# Patient Record
Sex: FEMALE | Race: WHITE | Employment: OTHER
[De-identification: names, ages, dates, MRNs, and addresses within clinical notes are randomized per-mention and may not be internally consistent; named-entity substitution may affect disease eponyms.]

---

## 2017-02-01 DIAGNOSIS — Z20.818 PERTUSSIS EXPOSURE: Primary | ICD-10-CM

## 2017-02-01 RX ORDER — AZITHROMYCIN 250 MG/1
TABLET, FILM COATED ORAL
Qty: 6 TABLET | Refills: 0 | Status: SHIPPED | OUTPATIENT
Start: 2017-02-01 | End: 2019-07-11

## 2017-02-01 NOTE — TELEPHONE ENCOUNTER
RN Close Contact Pertussis ExposureTreatment Protocol    Kayy Salazar, a 36 year old female, has been exposed to a known positive pertussis case.    ASSESSMENT/PLAN:   1.  Antibiotic treatment is indicated as per close contact guidelines.  Adults- Primary- Azithromycin 500 mg in a single dose on day 1 then 250 mg per day on days 2-5  2.  Follow-up: Contact clinic if further questions or concerns    SUBJECTIVE:  Symptoms:     Shortness of breath: NO    Cough: NO    Exposure to infectious person was lives with child  Predisposing conditions include: None  In addition notes: None    Complicating Factors or Serious Symptoms:   Patient reports: none  Patient denies: age under 1 year, age over 65, immunocompromised/ cancer/HIV/COPD, pregnant    ALLERGIES:  Allergies   Allergen Reactions     Amoxicillin Rash     Vicodin [Hydrocodone-Acetaminophen] Rash       OBJECTIVE:  Weight: 0 lbs 0 oz    Education provided.  Patient verbalizes understanding of this plan and is agreeable.  Encounter handled by: Nurse Triage with [facility] - provider name: .Dr. Silverio Maria  .     Tiffanie Howard RN

## 2017-12-17 ENCOUNTER — HEALTH MAINTENANCE LETTER (OUTPATIENT)
Age: 37
End: 2017-12-17

## 2019-07-11 ENCOUNTER — OFFICE VISIT (OUTPATIENT)
Dept: FAMILY MEDICINE | Facility: CLINIC | Age: 39
End: 2019-07-11
Payer: COMMERCIAL

## 2019-07-11 VITALS
HEART RATE: 60 BPM | RESPIRATION RATE: 18 BRPM | WEIGHT: 119 LBS | BODY MASS INDEX: 21.9 KG/M2 | OXYGEN SATURATION: 99 % | DIASTOLIC BLOOD PRESSURE: 68 MMHG | TEMPERATURE: 96.8 F | SYSTOLIC BLOOD PRESSURE: 122 MMHG | HEIGHT: 62 IN

## 2019-07-11 DIAGNOSIS — R55 SYNCOPE, UNSPECIFIED SYNCOPE TYPE: Primary | ICD-10-CM

## 2019-07-11 DIAGNOSIS — R79.0 LOW FERRITIN LEVEL: ICD-10-CM

## 2019-07-11 LAB
ALBUMIN SERPL-MCNC: 3.8 G/DL (ref 3.4–5)
ALP SERPL-CCNC: 52 U/L (ref 40–150)
ALT SERPL W P-5'-P-CCNC: 17 U/L (ref 0–50)
ANION GAP SERPL CALCULATED.3IONS-SCNC: 5 MMOL/L (ref 3–14)
AST SERPL W P-5'-P-CCNC: 16 U/L (ref 0–45)
BASOPHILS # BLD AUTO: 0 10E9/L (ref 0–0.2)
BASOPHILS NFR BLD AUTO: 0.5 %
BILIRUB SERPL-MCNC: 1.1 MG/DL (ref 0.2–1.3)
BUN SERPL-MCNC: 16 MG/DL (ref 7–30)
CALCIUM SERPL-MCNC: 8.9 MG/DL (ref 8.5–10.1)
CHLORIDE SERPL-SCNC: 104 MMOL/L (ref 94–109)
CO2 SERPL-SCNC: 27 MMOL/L (ref 20–32)
CREAT SERPL-MCNC: 0.78 MG/DL (ref 0.52–1.04)
DIFFERENTIAL METHOD BLD: NORMAL
EOSINOPHIL # BLD AUTO: 0.1 10E9/L (ref 0–0.7)
EOSINOPHIL NFR BLD AUTO: 1.4 %
ERYTHROCYTE [DISTWIDTH] IN BLOOD BY AUTOMATED COUNT: 14.3 % (ref 10–15)
FERRITIN SERPL-MCNC: 7 NG/ML (ref 12–150)
GFR SERPL CREATININE-BSD FRML MDRD: >90 ML/MIN/{1.73_M2}
GLUCOSE SERPL-MCNC: 98 MG/DL (ref 70–99)
HCT VFR BLD AUTO: 37.5 % (ref 35–47)
HGB BLD-MCNC: 12.3 G/DL (ref 11.7–15.7)
LYMPHOCYTES # BLD AUTO: 1.7 10E9/L (ref 0.8–5.3)
LYMPHOCYTES NFR BLD AUTO: 28.4 %
MCH RBC QN AUTO: 27.8 PG (ref 26.5–33)
MCHC RBC AUTO-ENTMCNC: 32.8 G/DL (ref 31.5–36.5)
MCV RBC AUTO: 85 FL (ref 78–100)
MONOCYTES # BLD AUTO: 0.3 10E9/L (ref 0–1.3)
MONOCYTES NFR BLD AUTO: 5.6 %
NEUTROPHILS # BLD AUTO: 3.8 10E9/L (ref 1.6–8.3)
NEUTROPHILS NFR BLD AUTO: 64.1 %
PLATELET # BLD AUTO: 221 10E9/L (ref 150–450)
POTASSIUM SERPL-SCNC: 3.9 MMOL/L (ref 3.4–5.3)
PROT SERPL-MCNC: 7.2 G/DL (ref 6.8–8.8)
RBC # BLD AUTO: 4.42 10E12/L (ref 3.8–5.2)
SODIUM SERPL-SCNC: 136 MMOL/L (ref 133–144)
TSH SERPL DL<=0.005 MIU/L-ACNC: 1.86 MU/L (ref 0.4–4)
WBC # BLD AUTO: 5.9 10E9/L (ref 4–11)

## 2019-07-11 PROCEDURE — 80053 COMPREHEN METABOLIC PANEL: CPT | Performed by: NURSE PRACTITIONER

## 2019-07-11 PROCEDURE — 82728 ASSAY OF FERRITIN: CPT | Performed by: NURSE PRACTITIONER

## 2019-07-11 PROCEDURE — 36415 COLL VENOUS BLD VENIPUNCTURE: CPT | Performed by: NURSE PRACTITIONER

## 2019-07-11 PROCEDURE — 84443 ASSAY THYROID STIM HORMONE: CPT | Performed by: NURSE PRACTITIONER

## 2019-07-11 PROCEDURE — 99203 OFFICE O/P NEW LOW 30 MIN: CPT | Performed by: NURSE PRACTITIONER

## 2019-07-11 PROCEDURE — 85025 COMPLETE CBC W/AUTO DIFF WBC: CPT | Performed by: NURSE PRACTITIONER

## 2019-07-11 ASSESSMENT — MIFFLIN-ST. JEOR: SCORE: 1163.03

## 2019-08-15 ENCOUNTER — TELEPHONE (OUTPATIENT)
Dept: FAMILY MEDICINE | Facility: CLINIC | Age: 39
End: 2019-08-15

## 2019-08-15 NOTE — TELEPHONE ENCOUNTER
Panel Management Review      Patient has the following on her problem list:   Patient Active Problem List   Diagnosis     Chronic diarrhea     Undiagnosed cardiac murmurs     Multiple joint pain         Composite cancer screening  Chart review shows that this patient is due/due soon for the following   Health Maintenance   Topic Date Due     PREVENTIVE CARE VISIT  1978     HIV SCREENING  10/30/1993     DTAP/TDAP/TD IMMUNIZATION (1 - Tdap) 10/30/2003     PAP  03/01/2016     PHQ-2  01/01/2019     INFLUENZA VACCINE (1) 09/01/2019     IPV IMMUNIZATION  Aged Out     MENINGITIS IMMUNIZATION  Aged Out     Summary:    Patient is due/failing the following:   PAP    Action needed:   Patient needs office visit for PE with pap screening .    Type of outreach:    Sent letter.    Questions for provider review:    None                                                                                                                                    Mary Chavez MA     Chart routed to none .

## 2019-08-15 NOTE — LETTER
[facility]  [address]  [place] [address]  Phone: [phone]      8/15/2019     Kayy Martin  [address] [address]      Dear Kayy:    Here at [facility], we care about your health and have reviewed your chart in order to best serve your health care needs. Based on this review, it is recommended that you complete the following:    You are due for an annual preventative physical with a pap screening. Please contact the clinic scheduling desk at [phone] to schedule this appointment with the provider of your choice . A pap test is used to detect cervical cancer. The test should be taken at least once every three years but women who are at a greater risk for cervical cancer may need to have the test more often. Recommended every three years for women 21 and older.     You are at a greater risk for cervical cancer if:   - You have had a sexually transmitted disease   - You have had more than one sex partner   - You have had an abnormal pap test in the past    If you have any additional questions or concerns, or if you have had testing done elsewhere, please notify your clinic. Thank you for trusting [facility] and we appreciate the opportunity to serve you. We look forward to supporting your healthcare needs soon.    If you have a My-Chart Account, you also can schedule this appointment through there.      Sincerely,      Aleisha BOONE CNP/Mary Chavez MA

## 2021-01-01 NOTE — PROGRESS NOTES
"Subjective     Kayy Salazar is a 38 year old female who presents to clinic today for the following health issues:    HPI   Concern - Syncope   Onset: July 4th     Description:   She was in the bathroom and was feeling light headed, felt like she had pain all over her body and felt nauseous, had tinnitus in both of her ears, was sweating, heart was beating fast, she went to lay down and passed out. Her daughter found her, she was unsure of how long her mother was passed out before.      The patient is feeling well today, denies history of palpitations, chest pain, shortness of breath. States tinnitus resolved. Kayy states that she was getting ready for a run, she did not had anything to eat, she usually eats after exercise. She thinks that she is drinking enough fluids. The patient reports having a lot of stress due to her daughter, who is 9, was recently diagnosed with leukemia.    Reviewed and updated as needed this visit by Provider  Problems         Review of Systems   ROS COMP: Constitutional, HEENT, cardiovascular, pulmonary, gi and gu systems are negative, except as otherwise noted.      Objective    /68 (BP Location: Left arm, Patient Position: Sitting, Cuff Size: Adult Regular)   Pulse 60   Temp 96.8  F (36  C) (Tympanic)   Resp 18   Ht 1.575 m (5' 2\")   Wt 54 kg (119 lb)   SpO2 99%   BMI 21.77 kg/m    Body mass index is 21.77 kg/m .  Physical Exam   GENERAL: healthy, alert and no distress  HENT: ear canals and TM's normal, nose and mouth without ulcers or lesions  NECK: no adenopathy, no asymmetry, masses, or scars and thyroid normal to palpation  RESP: lungs clear to auscultation - no rales, rhonchi or wheezes  CV: regular rate and rhythm, normal S1 S2, no S3 or S4, no murmur, click or rub, no peripheral edema and peripheral pulses strong  MS: no gross musculoskeletal defects noted, no edema  NEURO: Normal strength and tone, mentation intact and speech normal  PSYCH: mentation appears " Dr. Armin Tobar notified of tcb of 6.7 after 24 hours of age,cchd of 97/98 and blood sugar of 66 after 24 hours of age. normal, affect normal/bright    Diagnostic Test Results:  Labs reviewed in Epic  Results for orders placed or performed in visit on 07/11/19 (from the past 24 hour(s))   CBC with platelets differential   Result Value Ref Range    WBC 5.9 4.0 - 11.0 10e9/L    RBC Count 4.42 3.8 - 5.2 10e12/L    Hemoglobin 12.3 11.7 - 15.7 g/dL    Hematocrit 37.5 35.0 - 47.0 %    MCV 85 78 - 100 fl    MCH 27.8 26.5 - 33.0 pg    MCHC 32.8 31.5 - 36.5 g/dL    RDW 14.3 10.0 - 15.0 %    Platelet Count 221 150 - 450 10e9/L    % Neutrophils 64.1 %    % Lymphocytes 28.4 %    % Monocytes 5.6 %    % Eosinophils 1.4 %    % Basophils 0.5 %    Absolute Neutrophil 3.8 1.6 - 8.3 10e9/L    Absolute Lymphocytes 1.7 0.8 - 5.3 10e9/L    Absolute Monocytes 0.3 0.0 - 1.3 10e9/L    Absolute Eosinophils 0.1 0.0 - 0.7 10e9/L    Absolute Basophils 0.0 0.0 - 0.2 10e9/L    Diff Method Automated Method    TSH with free T4 reflex   Result Value Ref Range    TSH 1.86 0.40 - 4.00 mU/L   Comprehensive metabolic panel (BMP + Alb, Alk Phos, ALT, AST, Total. Bili, TP)   Result Value Ref Range    Sodium 136 133 - 144 mmol/L    Potassium 3.9 3.4 - 5.3 mmol/L    Chloride 104 94 - 109 mmol/L    Carbon Dioxide 27 20 - 32 mmol/L    Anion Gap 5 3 - 14 mmol/L    Glucose 98 70 - 99 mg/dL    Urea Nitrogen 16 7 - 30 mg/dL    Creatinine 0.78 0.52 - 1.04 mg/dL    GFR Estimate >90 >60 mL/min/[1.73_m2]    GFR Estimate If Black >90 >60 mL/min/[1.73_m2]    Calcium 8.9 8.5 - 10.1 mg/dL    Bilirubin Total 1.1 0.2 - 1.3 mg/dL    Albumin 3.8 3.4 - 5.0 g/dL    Protein Total 7.2 6.8 - 8.8 g/dL    Alkaline Phosphatase 52 40 - 150 U/L    ALT 17 0 - 50 U/L    AST 16 0 - 45 U/L   Ferritin   Result Value Ref Range    Ferritin 7 (L) 12 - 150 ng/mL           Assessment & Plan     1. Syncope, unspecified syncope type  -labs normal, which is reassuring, exam is normal and the patient is feeling well today  -advised to drink more fluids  - CBC with platelets differential  - TSH with free T4 reflex  -  Comprehensive metabolic panel (BMP + Alb, Alk Phos, ALT, AST, Total. Bili, TP)  - Ferritin    2. Low ferritin level  -she did not tolerate regular iron, recommended slow Fe  - ferrous sulfate (SLO-FE) 142 (45 Fe) MG CR tablet; Take 1 tablet (142 mg) by mouth daily  Dispense: 30 tablet; Refill: 0       See Patient Instructions    Return in about 2 weeks (around 7/25/2019), or if symptoms worsen or fail to improve.    PAOLO Solo [facility] [place]

## 2021-03-10 ENCOUNTER — OFFICE VISIT (OUTPATIENT)
Dept: FAMILY MEDICINE | Facility: CLINIC | Age: 41
End: 2021-03-10
Payer: COMMERCIAL

## 2021-03-10 VITALS
OXYGEN SATURATION: 99 % | DIASTOLIC BLOOD PRESSURE: 72 MMHG | BODY MASS INDEX: 21.16 KG/M2 | TEMPERATURE: 98 F | SYSTOLIC BLOOD PRESSURE: 126 MMHG | HEIGHT: 62 IN | RESPIRATION RATE: 12 BRPM | WEIGHT: 115 LBS | HEART RATE: 88 BPM

## 2021-03-10 DIAGNOSIS — D50.0 IRON DEFICIENCY ANEMIA DUE TO CHRONIC BLOOD LOSS: ICD-10-CM

## 2021-03-10 DIAGNOSIS — K52.9 CHRONIC DIARRHEA: Primary | ICD-10-CM

## 2021-03-10 LAB
BASOPHILS # BLD AUTO: 0 10E9/L (ref 0–0.2)
BASOPHILS NFR BLD AUTO: 0.4 %
DIFFERENTIAL METHOD BLD: ABNORMAL
EOSINOPHIL # BLD AUTO: 0 10E9/L (ref 0–0.7)
EOSINOPHIL NFR BLD AUTO: 0.2 %
ERYTHROCYTE [DISTWIDTH] IN BLOOD BY AUTOMATED COUNT: 16.1 % (ref 10–15)
HCT VFR BLD AUTO: 36 % (ref 35–47)
HGB BLD-MCNC: 11.1 G/DL (ref 11.7–15.7)
LYMPHOCYTES # BLD AUTO: 1.3 10E9/L (ref 0.8–5.3)
LYMPHOCYTES NFR BLD AUTO: 15.8 %
MCH RBC QN AUTO: 23.4 PG (ref 26.5–33)
MCHC RBC AUTO-ENTMCNC: 30.8 G/DL (ref 31.5–36.5)
MCV RBC AUTO: 76 FL (ref 78–100)
MONOCYTES # BLD AUTO: 0.4 10E9/L (ref 0–1.3)
MONOCYTES NFR BLD AUTO: 4.5 %
NEUTROPHILS # BLD AUTO: 6.6 10E9/L (ref 1.6–8.3)
NEUTROPHILS NFR BLD AUTO: 79.1 %
PLATELET # BLD AUTO: 307 10E9/L (ref 150–450)
RBC # BLD AUTO: 4.75 10E12/L (ref 3.8–5.2)
TSH SERPL DL<=0.005 MIU/L-ACNC: 1.47 MU/L (ref 0.4–4)
WBC # BLD AUTO: 8.3 10E9/L (ref 4–11)

## 2021-03-10 PROCEDURE — 85025 COMPLETE CBC W/AUTO DIFF WBC: CPT | Performed by: FAMILY MEDICINE

## 2021-03-10 PROCEDURE — 84443 ASSAY THYROID STIM HORMONE: CPT | Performed by: FAMILY MEDICINE

## 2021-03-10 PROCEDURE — 36415 COLL VENOUS BLD VENIPUNCTURE: CPT | Performed by: FAMILY MEDICINE

## 2021-03-10 PROCEDURE — 99203 OFFICE O/P NEW LOW 30 MIN: CPT | Performed by: FAMILY MEDICINE

## 2021-03-10 RX ORDER — ONDANSETRON 4 MG/1
4 TABLET, FILM COATED ORAL EVERY 8 HOURS PRN
Qty: 20 TABLET | Refills: 0 | Status: SHIPPED | OUTPATIENT
Start: 2021-03-10 | End: 2021-03-30

## 2021-03-10 ASSESSMENT — ENCOUNTER SYMPTOMS
FREQUENCY: 0
NAUSEA: 1
BELCHING: 0
DYSURIA: 0
FEVER: 0
HEADACHES: 0
ARTHRALGIAS: 0
CONSTIPATION: 0
HEMATURIA: 0
FLATUS: 1
DIARRHEA: 1
ANOREXIA: 1
HEMATOCHEZIA: 0
WEIGHT LOSS: 1
VOMITING: 1
ABDOMINAL PAIN: 1
MYALGIAS: 0

## 2021-03-10 ASSESSMENT — PATIENT HEALTH QUESTIONNAIRE - PHQ9
SUM OF ALL RESPONSES TO PHQ QUESTIONS 1-9: 9
10. IF YOU CHECKED OFF ANY PROBLEMS, HOW DIFFICULT HAVE THESE PROBLEMS MADE IT FOR YOU TO DO YOUR WORK, TAKE CARE OF THINGS AT HOME, OR GET ALONG WITH OTHER PEOPLE: SOMEWHAT DIFFICULT
SUM OF ALL RESPONSES TO PHQ QUESTIONS 1-9: 9

## 2021-03-10 ASSESSMENT — PAIN SCALES - GENERAL: PAINLEVEL: EXTREME PAIN (8)

## 2021-03-10 ASSESSMENT — MIFFLIN-ST. JEOR: SCORE: 1131.27

## 2021-03-10 NOTE — PROGRESS NOTES
Assessment & Plan     Chronic diarrhea  Acute on chronic diarrhea, differentials for infectious vs IBS vs IBS vs food allergy. Diagnostic orders as below, further plans will depend on clinical status.  - CBC with platelets and differential  - Comprehensive metabolic panel; Future  - TSH with free T4 reflex  - Enteric Bacteria and Virus Panel by MANDO Stool; Future  - Helicobacter pylori Antigen Stool; Future  - Ova and Parasite Exam Routine; Future  - Clostridium difficile Toxin B PCR; Future  - ondansetron (ZOFRAN) 4 MG tablet; Take 1 tablet (4 mg) by mouth every 8 hours as needed for nausea      20  minutes spent on the date of the encounter doing chart review, history and exam, documentation and further activities as noted above           Return in about 2 weeks (around 3/24/2021) for Routine preventive/recheck.    Karol Hairston MD  [facility] VASQUEZ Sultana is a 40 year old who presents for the following health issues  accompanied by her Self:    Abdominal Pain   This is a new problem. The current episode started more than 1 month ago. The problem occurs constantly. The problem has been gradually worsening. The pain is located in the epigastric region. The quality of the pain is dull and sharp. The pain is at a severity of 8/10. Associated symptoms include anorexia, diarrhea, flatus, nausea and vomiting. Pertinent negatives include fever, belching, hematochezia, melena, constipation, dysuria, frequency, hematuria, headaches, arthralgias and myalgias. The symptoms are aggravated by eating. Nothing relieves the symptoms.    unintentional 8 pound weight loss    Review of Systems   Constitutional: Negative for fever.   Gastrointestinal: Positive for abdominal pain, anorexia, diarrhea, flatus, nausea and vomiting. Negative for constipation, hematochezia and melena.   Genitourinary: Negative for dysuria, frequency and hematuria.   Musculoskeletal: Negative for arthralgias and  "myalgias.   Neurological: Negative for headaches.            Objective    /72   Pulse 88   Temp 98  F (36.7  C) (Temporal)   Resp 12   Ht 1.569 m (5' 1.77\")   Wt 52.2 kg (115 lb)   LMP 02/14/2021 (Exact Date)   SpO2 99%   BMI 21.19 kg/m    Body mass index is 21.19 kg/m .  Physical Exam   GENERAL: healthy, alert and no distress  EYES: Eyes grossly normal to inspection, PERRL and conjunctivae and sclerae normal  HENT: ear canals and TM's normal, nose and mouth without ulcers or lesions but dry mucous memebranes  NECK: no adenopathy, no asymmetry, masses, or scars and thyroid normal to palpation  RESP: lungs clear to auscultation - no rales, rhonchi or wheezes  CV: regular rate and rhythm, normal S1 S2, no S3 or S4, no murmur, click or rub, no peripheral edema and peripheral pulses strong  ABDOMEN: soft, Tender low quadrant, no hepatosplenomegaly, no masses and bowel sounds normal  MS: no gross musculoskeletal defects noted, no edema            Answers for HPI/ROS submitted by the patient on 3/10/2021   If you checked off any problems, how difficult have these problems made it for you to do your work, take care of things at home, or get along with other people?: Somewhat difficult  PHQ9 TOTAL SCORE: 9    "

## 2021-03-10 NOTE — PATIENT INSTRUCTIONS
I have recommended small amounts clear fluids frequently, Pedialyte, soups, water, BRAT diet, advance diet as tolerated and Immodium OTC prn diarrhea. Return office visit if symptoms persist or worsen; I have alerted the patient to call if high fever, dehydration, marked weakness, fainting, increased abdominal pain, blood in stool or vomit.    Imodium ( loperamide)

## 2021-03-11 DIAGNOSIS — K52.9 CHRONIC DIARRHEA: ICD-10-CM

## 2021-03-11 LAB
C COLI+JEJUNI+LARI FUSA STL QL NAA+PROBE: NOT DETECTED
C DIFF TOX B STL QL: NEGATIVE
EC STX1 GENE STL QL NAA+PROBE: NOT DETECTED
EC STX2 GENE STL QL NAA+PROBE: NOT DETECTED
ENTERIC PATHOGEN COMMENT: NORMAL
NOROV GI+II ORF1-ORF2 JNC STL QL NAA+PR: NOT DETECTED
RVA NSP5 STL QL NAA+PROBE: NOT DETECTED
SALMONELLA SP RPOD STL QL NAA+PROBE: NOT DETECTED
SHIGELLA SP+EIEC IPAH STL QL NAA+PROBE: NOT DETECTED
SPECIMEN SOURCE: NORMAL
V CHOL+PARA RFBL+TRKH+TNAA STL QL NAA+PR: NOT DETECTED
Y ENTERO RECN STL QL NAA+PROBE: NOT DETECTED

## 2021-03-11 PROCEDURE — 87506 IADNA-DNA/RNA PROBE TQ 6-11: CPT | Performed by: FAMILY MEDICINE

## 2021-03-11 PROCEDURE — 87493 C DIFF AMPLIFIED PROBE: CPT | Mod: 59 | Performed by: FAMILY MEDICINE

## 2021-03-11 PROCEDURE — 83993 ASSAY FOR CALPROTECTIN FECAL: CPT | Performed by: FAMILY MEDICINE

## 2021-03-11 PROCEDURE — 87209 SMEAR COMPLEX STAIN: CPT | Performed by: FAMILY MEDICINE

## 2021-03-11 PROCEDURE — 87177 OVA AND PARASITES SMEARS: CPT | Performed by: FAMILY MEDICINE

## 2021-03-12 DIAGNOSIS — K52.9 CHRONIC DIARRHEA: Primary | ICD-10-CM

## 2021-03-12 LAB
CALPROTECTIN STL-MCNT: 58.9 MG/KG (ref 0–49.9)
O+P STL MICRO: NORMAL
O+P STL MICRO: NORMAL
SPECIMEN SOURCE: NORMAL

## 2021-03-15 ENCOUNTER — TELEPHONE (OUTPATIENT)
Dept: FAMILY MEDICINE | Facility: CLINIC | Age: 41
End: 2021-03-15

## 2021-03-15 DIAGNOSIS — K52.9 CHRONIC DIARRHEA: Primary | ICD-10-CM

## 2021-03-15 NOTE — TELEPHONE ENCOUNTER
Provider ordered labs that we are unable to add on.  Orders credited and placed as future please reach out to patient as needed.

## 2021-03-17 ENCOUNTER — OFFICE VISIT (OUTPATIENT)
Dept: FAMILY MEDICINE | Facility: CLINIC | Age: 41
End: 2021-03-17
Payer: COMMERCIAL

## 2021-03-17 ENCOUNTER — ANCILLARY PROCEDURE (OUTPATIENT)
Dept: CT IMAGING | Facility: CLINIC | Age: 41
End: 2021-03-17
Attending: FAMILY MEDICINE
Payer: COMMERCIAL

## 2021-03-17 ENCOUNTER — INFUSION THERAPY VISIT (OUTPATIENT)
Dept: INFUSION THERAPY | Facility: CLINIC | Age: 41
End: 2021-03-17
Payer: COMMERCIAL

## 2021-03-17 VITALS
DIASTOLIC BLOOD PRESSURE: 74 MMHG | WEIGHT: 114.5 LBS | TEMPERATURE: 98.1 F | HEIGHT: 62 IN | RESPIRATION RATE: 18 BRPM | BODY MASS INDEX: 21.07 KG/M2 | HEART RATE: 81 BPM | OXYGEN SATURATION: 100 % | SYSTOLIC BLOOD PRESSURE: 134 MMHG

## 2021-03-17 DIAGNOSIS — R10.84 ABDOMINAL PAIN, GENERALIZED: Primary | ICD-10-CM

## 2021-03-17 DIAGNOSIS — N83.8 MASS OF LEFT OVARY: ICD-10-CM

## 2021-03-17 DIAGNOSIS — R11.0 NAUSEA: ICD-10-CM

## 2021-03-17 DIAGNOSIS — N91.2 ABSENCE OF MENSTRUATION: ICD-10-CM

## 2021-03-17 DIAGNOSIS — D50.0 IRON DEFICIENCY ANEMIA DUE TO CHRONIC BLOOD LOSS: ICD-10-CM

## 2021-03-17 LAB
ALBUMIN SERPL-MCNC: 3.7 G/DL (ref 3.4–5)
ALP SERPL-CCNC: 50 U/L (ref 40–150)
ALT SERPL W P-5'-P-CCNC: 15 U/L (ref 0–50)
ANION GAP SERPL CALCULATED.3IONS-SCNC: 6 MMOL/L (ref 3–14)
AST SERPL W P-5'-P-CCNC: 10 U/L (ref 0–45)
BASOPHILS # BLD AUTO: 0 10E9/L (ref 0–0.2)
BASOPHILS NFR BLD AUTO: 0.5 %
BILIRUB SERPL-MCNC: 0.6 MG/DL (ref 0.2–1.3)
BUN SERPL-MCNC: 10 MG/DL (ref 7–30)
CALCIUM SERPL-MCNC: 9.1 MG/DL (ref 8.5–10.1)
CANCER AG125 SERPL-ACNC: 191 U/ML (ref 0–30)
CHLORIDE SERPL-SCNC: 101 MMOL/L (ref 94–109)
CO2 SERPL-SCNC: 30 MMOL/L (ref 20–32)
CREAT SERPL-MCNC: 0.71 MG/DL (ref 0.52–1.04)
DIFFERENTIAL METHOD BLD: ABNORMAL
EOSINOPHIL # BLD AUTO: 0.1 10E9/L (ref 0–0.7)
EOSINOPHIL NFR BLD AUTO: 0.9 %
ERYTHROCYTE [DISTWIDTH] IN BLOOD BY AUTOMATED COUNT: 16.9 % (ref 10–15)
GFR SERPL CREATININE-BSD FRML MDRD: >90 ML/MIN/{1.73_M2}
GLUCOSE SERPL-MCNC: 78 MG/DL (ref 70–99)
HCG UR QL: NEGATIVE
HCT VFR BLD AUTO: 37.1 % (ref 35–47)
HGB BLD-MCNC: 11.3 G/DL (ref 11.7–15.7)
IRON SATN MFR SERPL: 6 % (ref 15–46)
IRON SERPL-MCNC: 22 UG/DL (ref 35–180)
LYMPHOCYTES # BLD AUTO: 1.5 10E9/L (ref 0.8–5.3)
LYMPHOCYTES NFR BLD AUTO: 27.1 %
MCH RBC QN AUTO: 23.3 PG (ref 26.5–33)
MCHC RBC AUTO-ENTMCNC: 30.5 G/DL (ref 31.5–36.5)
MCV RBC AUTO: 77 FL (ref 78–100)
MONOCYTES # BLD AUTO: 0.3 10E9/L (ref 0–1.3)
MONOCYTES NFR BLD AUTO: 6 %
NEUTROPHILS # BLD AUTO: 3.6 10E9/L (ref 1.6–8.3)
NEUTROPHILS NFR BLD AUTO: 65.5 %
PLATELET # BLD AUTO: 370 10E9/L (ref 150–450)
POTASSIUM SERPL-SCNC: 3.4 MMOL/L (ref 3.4–5.3)
PROT SERPL-MCNC: 7.3 G/DL (ref 6.8–8.8)
RADIOLOGIST FLAGS: ABNORMAL
RBC # BLD AUTO: 4.84 10E12/L (ref 3.8–5.2)
SODIUM SERPL-SCNC: 137 MMOL/L (ref 133–144)
TIBC SERPL-MCNC: 349 UG/DL (ref 240–430)
WBC # BLD AUTO: 5.5 10E9/L (ref 4–11)

## 2021-03-17 PROCEDURE — 96361 HYDRATE IV INFUSION ADD-ON: CPT | Performed by: NURSE PRACTITIONER

## 2021-03-17 PROCEDURE — 86304 IMMUNOASSAY TUMOR CA 125: CPT | Performed by: FAMILY MEDICINE

## 2021-03-17 PROCEDURE — 99207 PR NO CHARGE LOS: CPT

## 2021-03-17 PROCEDURE — 80053 COMPREHEN METABOLIC PANEL: CPT | Performed by: FAMILY MEDICINE

## 2021-03-17 PROCEDURE — 96374 THER/PROPH/DIAG INJ IV PUSH: CPT | Performed by: NURSE PRACTITIONER

## 2021-03-17 PROCEDURE — 99214 OFFICE O/P EST MOD 30 MIN: CPT | Performed by: FAMILY MEDICINE

## 2021-03-17 PROCEDURE — 83550 IRON BINDING TEST: CPT | Performed by: FAMILY MEDICINE

## 2021-03-17 PROCEDURE — 83540 ASSAY OF IRON: CPT | Performed by: FAMILY MEDICINE

## 2021-03-17 PROCEDURE — 36415 COLL VENOUS BLD VENIPUNCTURE: CPT | Performed by: FAMILY MEDICINE

## 2021-03-17 PROCEDURE — 85025 COMPLETE CBC W/AUTO DIFF WBC: CPT | Performed by: FAMILY MEDICINE

## 2021-03-17 PROCEDURE — 81025 URINE PREGNANCY TEST: CPT | Performed by: FAMILY MEDICINE

## 2021-03-17 PROCEDURE — 74177 CT ABD & PELVIS W/CONTRAST: CPT | Performed by: RADIOLOGY

## 2021-03-17 RX ORDER — ONDANSETRON 2 MG/ML
4 INJECTION INTRAMUSCULAR; INTRAVENOUS ONCE
Status: CANCELLED
Start: 2021-03-17 | End: 2021-03-17

## 2021-03-17 RX ORDER — ONDANSETRON 2 MG/ML
4 INJECTION INTRAMUSCULAR; INTRAVENOUS ONCE
Status: COMPLETED | OUTPATIENT
Start: 2021-03-17 | End: 2021-03-17

## 2021-03-17 RX ORDER — HEPARIN SODIUM,PORCINE 10 UNIT/ML
5 VIAL (ML) INTRAVENOUS
Status: CANCELLED | OUTPATIENT
Start: 2021-03-17

## 2021-03-17 RX ORDER — HEPARIN SODIUM (PORCINE) LOCK FLUSH IV SOLN 100 UNIT/ML 100 UNIT/ML
5 SOLUTION INTRAVENOUS
Status: CANCELLED | OUTPATIENT
Start: 2021-03-17

## 2021-03-17 RX ORDER — IOPAMIDOL 755 MG/ML
70 INJECTION, SOLUTION INTRAVASCULAR ONCE
Status: COMPLETED | OUTPATIENT
Start: 2021-03-17 | End: 2021-03-17

## 2021-03-17 RX ADMIN — IOPAMIDOL 70 ML: 755 INJECTION, SOLUTION INTRAVASCULAR at 12:19

## 2021-03-17 RX ADMIN — ONDANSETRON 4 MG: 2 INJECTION INTRAMUSCULAR; INTRAVENOUS at 13:08

## 2021-03-17 RX ADMIN — Medication 1000 ML: at 13:05

## 2021-03-17 ASSESSMENT — ENCOUNTER SYMPTOMS
HEADACHES: 0
HEMATURIA: 0
NAUSEA: 1
FLATUS: 0
FREQUENCY: 0
DIARRHEA: 1
ARTHRALGIAS: 0
BELCHING: 1
MYALGIAS: 0
ANOREXIA: 1
CONSTIPATION: 0
VOMITING: 0
WEIGHT LOSS: 0
HEMATOCHEZIA: 0
DYSURIA: 0
ABDOMINAL PAIN: 1
FEVER: 0

## 2021-03-17 ASSESSMENT — MIFFLIN-ST. JEOR: SCORE: 1129

## 2021-03-17 ASSESSMENT — PAIN SCALES - GENERAL: PAINLEVEL: EXTREME PAIN (8)

## 2021-03-17 NOTE — PROGRESS NOTES
Infusion Nursing Note:  Kayy Salazar presents today for IVF/Zofran.    Patient seen by provider today: Yes: previously in a different clinic, Dr Hairston    present during visit today: Not Applicable.    Note: Pt states she has had abdominal pain, nausea and diarrhea for about 4-6 weeks.  See flow sheet for asessment      Intravenous Access:  Peripheral IV placed in CT    Treatment Conditions:  Not Applicable.      Post Infusion Assessment:  Patient tolerated infusion without incident.  Blood return noted pre and post infusion.  Site patent and intact, free from redness, edema or discomfort.  No evidence of extravasations.  Access discontinued per protocol.       Discharge Plan:   Patient discharged in stable condition accompanied by: self.  Departure Mode: Ambulatory.  Pt will RTC if needed.    Juvencio Mcdonnell RN

## 2021-03-17 NOTE — PROGRESS NOTES
Assessment & Plan     Abdominal pain, generalized  Stat ct abdomen/pelvis ordered at the [facility]  Discussed with the infusion center, IVF and IV zofran 4 mg ordered, patient declined morphine IV or any other patient medication  Out patient infusion orders in.  - CT Abdomen Pelvis w Contrast  - Comprehensive metabolic panel  - JUST IN CASE  -   - Oncology/Hematology Adult Referral; Future    Mass of left ovary  I reviewed case with on call Dr Johnson who recommended I discuss further with GYN-oncology. Dr Villalobos the on call fellow was paged, reviewed case and ct abdomen/pelvs with her. Recommended in-clinic new patient consultation.   Patient has a visit 3/18/2021 with Dr Ch at 1:20PM  Patient and spouse Eic with notified of results and plan of care. Declined tramadol for pain management but will take over the counter tylenol at appropriate doses.  -   - Oncology/Hematology Adult Referral; Future    Iron deficiency anemia due to chronic blood loss  - Iron and iron binding capacity  - CBC with platelets and differential  -     Nausea    Absence of menstruation  - HCG Qual, Urine ([id number])    60 minutes spent on the date of the encounter doing chart review, history and exam, documentation and further activities as noted above      No follow-ups on file.    Karol Hairston MD  [facility] VASQUEZ Salazar is a 40 year old female who presents to clinic today for the following health issues accompanied by her Self:    Abdominal Pain   This is a recurrent problem. The current episode started more than 1 month ago. The problem occurs constantly. The problem has been gradually worsening. The pain is located in the generalized abdominal region. The quality of the pain is dull and sharp. The pain is at a severity of 8/10. Associated symptoms include anorexia, belching, diarrhea and nausea. Pertinent negatives include fever, flatus, hematochezia,  melena, vomiting, constipation, dysuria, frequency, hematuria, headaches, arthralgias and myalgias. The symptoms are aggravated by eating and movement. The symptoms are relieved by being still, certain positions and recumbency.      Answers for HPI/ROS submitted by the patient on 3/17/2021   Abdominal pain  Onset quality: gradual  Episode duration: 24 hours  Radiates to: LLQ, epigastric region  weight loss: No  Reproductive organs: 4.1 cm hypoenhancing lesion in the posterior  myometrium is likely a fibroid. 2 enhancing nodules measuring up to 9  mm noted on the surface of the uterine fundus (series 3, image 307)  There is an ill-defined soft tissue density mass measuring 4.8 x 6.2  cm in the left adnexa which is contiguous with the left ovary and  peritoneal lining. Ill-defined peritoneal thickening noted along the  right uterosacral ligament and in the right paracolic region (series  3, image 358 and 244). Nodular omental thickening near the hepatic  flexure (series 3, image 205). There is large volume ascites.     Gastrointestinal: The stomach and duodenum are unremarkable. Small and  large bowel are normal in caliber and without abnormal wall  thickening.     Lymph nodes: Bilateral internal and external iliac lymph nodes  measuring up to 7 mm are nonspecific (series 3, image 325).     Vasculature: Major abdominal arteries are patent.     Bones and soft tissues: No aggressive lytic or sclerotic lesions.                                                                      IMPRESSION:   1.  Left adnexal soft tissue mass associated with large volume ascites  and foci of peritoneal thickening is suspicious for ovarian/peritoneal  malignancy. Recommend gyne-oncology consultation.  2.  4.1 cm posterior uterine mass is likely a fibroid. Enhancing  nodule on the surface of the uterine fundus could be a subserosal  fibroid or peritoneal deposit.  3.  Focal ill-defined hypoenhancement in the liver, near the  "falciform  ligament, could be due to focal fatty infiltration versus peritoneal  deposit. Enhancing lesion in segment 8 is favored to be a hemangioma  but needs confirmation with MRI.  4.  Subcentimeter pelvic lymph nodes are indeterminate.     [Access Center: Left adnexal soft tissue mass associated with large  volume ascites and foci of peritoneal thickening is suspicious for  ovarian/peritoneal malignancy. Recommend gyne-oncology consultation.        Review of Systems   Constitutional: Negative for fever.   Gastrointestinal: Positive for abdominal pain, anorexia, diarrhea and nausea. Negative for constipation, flatus, hematochezia, melena and vomiting.   Genitourinary: Negative for dysuria, frequency and hematuria.   Musculoskeletal: Negative for arthralgias and myalgias.   Neurological: Negative for headaches.          Objective    /74   Pulse 81   Temp 98.1  F (36.7  C) (Temporal)   Resp 18   Ht 1.569 m (5' 1.77\")   Wt 51.9 kg (114 lb 8 oz)   SpO2 100%   BMI 21.10 kg/m    Body mass index is 21.1 kg/m .  Physical Exam   GENERAL: healthy, alert and no distress  EYES: Eyes grossly normal to inspection, PERRL and conjunctivae and sclerae normal  HENT: ear canals and TM's normal, nose and mouth without ulcers or lesions  NECK: no adenopathy, no asymmetry, masses, or scars and thyroid normal to palpation  RESP: lungs clear to auscultation - no rales, rhonchi or wheezes  CV: regular rate and rhythm, normal S1 S2, no S3 or S4, no murmur, click or rub, no peripheral edema and peripheral pulses strong  ABDOMEN: Distendened, generalized tenderness, hypoactive bowel sounds  MS: no gross musculoskeletal defects noted, no edema    Results for orders placed or performed in visit on 03/17/21   CT Abdomen Pelvis w Contrast     Status: Abnormal   Result Value Ref Range    Radiologist flags (Urgent)      Left adnexal soft tissue mass associated with large    Narrative    EXAMINATION: CT ABDOMEN PELVIS W CONTRAST, " 3/17/2021 12:29 PM    TECHNIQUE: Axial CT images from the lung bases through the symphysis  pubis were obtained  with IV contrast. Coronal and sagittal reformats  also provided. Contrast dose: isovue 370 70mL    COMPARISON: None    HISTORY: Abdominal pain, acute (Ped 0-18y); Abdominal pain,  generalized    FINDINGS:    Lung Bases:   The visualized lung bases and lower mediastinal structures are  unremarkable.    ABDOMEN:  Liver: Focal ill-defined hypodensity near the falciform ligament. A  rounded enhancing lesion measuring 1.2 cm in segment 8 (series 3,  image 46). Portal and hepatic veins are patent.    Biliary/Gallbladder: No CT evidence of calculi, wall thickening or  pericholecystic fluid. No intra or extrahepatic biliary dilatation.    Spleen: Normal size. No focal lesions.    Pancreas: No evidence of pancreatic mass or peripancreatic fluid.    Adrenals: Normal.    Kidneys: No hydronephrosis, calculi or mass.    Urinary bladder: Unremarkable.    Reproductive organs: 4.1 cm hypoenhancing lesion in the posterior  myometrium is likely a fibroid. 2 enhancing nodules measuring up to 9  mm noted on the surface of the uterine fundus (series 3, image 307)  There is an ill-defined soft tissue density mass measuring 4.8 x 6.2  cm in the left adnexa which is contiguous with the left ovary and  peritoneal lining. Ill-defined peritoneal thickening noted along the  right uterosacral ligament and in the right paracolic region (series  3, image 358 and 244). Nodular omental thickening near the hepatic  flexure (series 3, image 205). There is large volume ascites.    Gastrointestinal: The stomach and duodenum are unremarkable. Small and  large bowel are normal in caliber and without abnormal wall  thickening.    Lymph nodes: Bilateral internal and external iliac lymph nodes  measuring up to 7 mm are nonspecific (series 3, image 325).    Vasculature: Major abdominal arteries are patent.    Bones and soft tissues: No aggressive  lytic or sclerotic lesions.      Impression    IMPRESSION:   1.  Left adnexal soft tissue mass associated with large volume ascites  and foci of peritoneal thickening is suspicious for ovarian/peritoneal  malignancy. Recommend gyne-oncology consultation.  2.  4.1 cm posterior uterine mass is likely a fibroid. Enhancing  nodule on the surface of the uterine fundus could be a subserosal  fibroid or peritoneal deposit.  3.  Focal ill-defined hypoenhancement in the liver, near the falciform  ligament, could be due to focal fatty infiltration versus peritoneal  deposit. Enhancing lesion in segment 8 is favored to be a hemangioma  but needs confirmation with MRI.  4.  Subcentimeter pelvic lymph nodes are indeterminate.    [Access Center: Left adnexal soft tissue mass associated with large  volume ascites and foci of peritoneal thickening is suspicious for  ovarian/peritoneal malignancy. Recommend gyne-oncology consultation.      This report will be copied to the [facility] to ensure a  provider acknowledges the finding. Access Center is available Monday  through Friday 8am-3:30 pm.     DARWIN RUBIN MD   CBC with platelets and differential     Status: Abnormal   Result Value Ref Range    WBC 5.5 4.0 - 11.0 10e9/L    RBC Count 4.84 3.8 - 5.2 10e12/L    Hemoglobin 11.3 (L) 11.7 - 15.7 g/dL    Hematocrit 37.1 35.0 - 47.0 %    MCV 77 (L) 78 - 100 fl    MCH 23.3 (L) 26.5 - 33.0 pg    MCHC 30.5 (L) 31.5 - 36.5 g/dL    RDW 16.9 (H) 10.0 - 15.0 %    Platelet Count 370 150 - 450 10e9/L    % Neutrophils 65.5 %    % Lymphocytes 27.1 %    % Monocytes 6.0 %    % Eosinophils 0.9 %    % Basophils 0.5 %    Absolute Neutrophil 3.6 1.6 - 8.3 10e9/L    Absolute Lymphocytes 1.5 0.8 - 5.3 10e9/L    Absolute Monocytes 0.3 0.0 - 1.3 10e9/L    Absolute Eosinophils 0.1 0.0 - 0.7 10e9/L    Absolute Basophils 0.0 0.0 - 0.2 10e9/L    Diff Method Automated Method    HCG Qual, Urine ([id number])     Status: None   Result Value Ref Range     HCG Qual Urine Negative NEG^Negative

## 2021-03-17 NOTE — TELEPHONE ENCOUNTER
ONCOLOGY INTAKE: Records Information      APPT INFORMATION:  Referring provider:  Karol Hairston MD  Referring provider s clinic:  [facility]  Reason for visit/diagnosis: Abdominal pain, generalized;Mass of left ovary  Has patient been notified of appointment date and time?: Yes    RECORDS INFORMATION:  Were the records received with the referral (via [employer])? No    Has patient been seen for any external appt for this diagnosis? No    If yes, where? N/a    Has patient had any imaging or procedures outside of [facility] for this condition? No      If Yes, where? N/a    ADDITIONAL INFORMATION:  None

## 2021-03-18 ENCOUNTER — ONCOLOGY VISIT (OUTPATIENT)
Dept: ONCOLOGY | Facility: CLINIC | Age: 41
End: 2021-03-18
Attending: FAMILY MEDICINE
Payer: COMMERCIAL

## 2021-03-18 ENCOUNTER — PRE VISIT (OUTPATIENT)
Dept: ONCOLOGY | Facility: CLINIC | Age: 41
End: 2021-03-18

## 2021-03-18 ENCOUNTER — TELEPHONE (OUTPATIENT)
Dept: ONCOLOGY | Facility: CLINIC | Age: 41
End: 2021-03-18

## 2021-03-18 VITALS
HEART RATE: 76 BPM | TEMPERATURE: 98.7 F | WEIGHT: 115.5 LBS | RESPIRATION RATE: 18 BRPM | SYSTOLIC BLOOD PRESSURE: 119 MMHG | DIASTOLIC BLOOD PRESSURE: 78 MMHG | OXYGEN SATURATION: 99 % | BODY MASS INDEX: 21.28 KG/M2

## 2021-03-18 DIAGNOSIS — R18.8 OTHER ASCITES: ICD-10-CM

## 2021-03-18 DIAGNOSIS — N83.8 MASS OF LEFT OVARY: ICD-10-CM

## 2021-03-18 DIAGNOSIS — R10.84 ABDOMINAL PAIN, GENERALIZED: ICD-10-CM

## 2021-03-18 PROCEDURE — 99205 OFFICE O/P NEW HI 60 MIN: CPT | Performed by: OBSTETRICS & GYNECOLOGY

## 2021-03-18 RX ORDER — CLINDAMYCIN PHOSPHATE 900 MG/50ML
900 INJECTION, SOLUTION INTRAVENOUS SEE ADMIN INSTRUCTIONS
Status: CANCELLED | OUTPATIENT
Start: 2021-03-18

## 2021-03-18 RX ORDER — HEPARIN SODIUM 5000 [USP'U]/.5ML
5000 INJECTION, SOLUTION INTRAVENOUS; SUBCUTANEOUS
Status: CANCELLED | OUTPATIENT
Start: 2021-03-18

## 2021-03-18 RX ORDER — HEPARIN SODIUM,PORCINE 10 UNIT/ML
5 VIAL (ML) INTRAVENOUS
Status: CANCELLED | OUTPATIENT
Start: 2021-03-18

## 2021-03-18 RX ORDER — HEPARIN SODIUM (PORCINE) LOCK FLUSH IV SOLN 100 UNIT/ML 100 UNIT/ML
5 SOLUTION INTRAVENOUS
Status: CANCELLED | OUTPATIENT
Start: 2021-03-18

## 2021-03-18 RX ORDER — CLINDAMYCIN PHOSPHATE 900 MG/50ML
900 INJECTION, SOLUTION INTRAVENOUS
Status: CANCELLED | OUTPATIENT
Start: 2021-03-18

## 2021-03-18 RX ORDER — LIDOCAINE HYDROCHLORIDE 10 MG/ML
20 INJECTION, SOLUTION EPIDURAL; INFILTRATION; INTRACAUDAL; PERINEURAL ONCE
Status: CANCELLED | OUTPATIENT
Start: 2021-03-18 | End: 2021-03-18

## 2021-03-18 RX ORDER — OXYCODONE HYDROCHLORIDE 5 MG/1
5 TABLET ORAL EVERY 6 HOURS PRN
Qty: 30 TABLET | Refills: 0 | Status: SHIPPED | OUTPATIENT
Start: 2021-03-18 | End: 2021-03-21

## 2021-03-18 RX ORDER — PHENAZOPYRIDINE HYDROCHLORIDE 200 MG/1
200 TABLET, FILM COATED ORAL ONCE
Status: CANCELLED | OUTPATIENT
Start: 2021-03-18 | End: 2021-03-18

## 2021-03-18 RX ORDER — GENTAMICIN SULFATE 100 MG/100ML
2 INJECTION, SOLUTION INTRAVENOUS
Status: CANCELLED | OUTPATIENT
Start: 2021-03-18

## 2021-03-18 ASSESSMENT — PAIN SCALES - GENERAL: PAINLEVEL: EXTREME PAIN (8)

## 2021-03-18 NOTE — TELEPHONE ENCOUNTER
DERICKM for Kayy inquiring if she would be able to have her MRI and CT scan tomorrow at [facility].    Noted that we can change the appointments tomorrow morning if needed.    MRI is at 2:15 and CT is at 3.    Also sent avocadostore.    Provided callback number.

## 2021-03-18 NOTE — LETTER
3/18/2021         RE: Kayy Salazar  [address] [facility] [address]        Dear Colleague,    Thank you for referring your patient, Kayy Salazar, to the [facility]. Please see a copy of my visit note below.                            Consult Notes on Referred Patient    Date: 3/18/2021       Dr. Karol Hairston MD  [address] [place] [address]       RE: Kayy Salazar  : 1980  YAN: 3/18/2021    Dear Dr. Karol Hairston:    I had the pleasure of seeing your patient Kayy Salazar here at the Gynecologic Cancer Clinic at the [facility] on 3/18/2021.  As you know she is a very pleasant 40 year old woman with a recent diagnosis of pelvic mass and carcinomatosis. Given these findings she was subsequently sent to the Gynecologic Cancer Clinic for new patient consultation.   G3, P3, had a  section followed by 2 VBACs Regular cycles every 27 days.  She had been on oral birth control pills in her early teens and 20s for about 6 years.  She has a long-standing history of intrabowel syndrome, started having more GI symptoms in late January followed by abdominal distention recently.  Recent CT shows ascites, possible carcinomatosis and a pelvic mass.  CA-125 is elevated to 191.  She has had some issues with vomiting and nausea since late January.  Reduced oral intake.  Some urinary frequency with abdominal distention.  She has lost about 8 pounds in the last couple of weeks.             Past Medical History:    Past Medical History:   Diagnosis Date     Endometriosis          Past Surgical History:    Past Surgical History:   Procedure Laterality Date     GYN SURGERY      c section      LAPAROSCOPIC ABLATION ENDOMETRIOSIS               Health Maintenance:  Health Maintenance Due   Topic Date Due     PREVENTIVE CARE VISIT  Never done     ADVANCE CARE PLANNING  Never done     HIV SCREENING  Never done     HEPATITIS C SCREENING  Never  done     DTAP/TDAP/TD IMMUNIZATION (1 - Tdap) Never done     PAP  03/01/2016     INFLUENZA VACCINE (1) Never done       Normal Pap smears.  Had last colonoscopy in 2014.           Current Medications:     has a current medication list which includes the following prescription(s): acetaminophen and ondansetron.       Allergies:     [unfilled]        Social History:     Social History     Tobacco Use     Smoking status: Never Smoker     Smokeless tobacco: Never Used   Substance Use Topics     Alcohol use: No       History   Drug Use No           Family History:       Family History   Problem Relation Age of Onset     Blood Disease Mother         hemachromatosis      Diabetes Maternal Grandmother      Alzheimer Disease Maternal Grandmother      Multiple Sclerosis Maternal Grandmother      Pancreatic Cancer Paternal Grandmother      Leukemia Paternal Grandfather      The patient's daughter has leukemia and is currently treated at the age of 10.  Paternal grandfather had leukemia at age 65, and a maternal grandmother had pancreatic cancer at age 71.         Physical Exam:     /78   Pulse 76   Temp 98.7  F (37.1  C) (Oral)   Resp 18   Wt 52.4 kg (115 lb 8 oz)   LMP 03/11/2021   SpO2 99%   BMI 21.28 kg/m    Body mass index is 21.28 kg/m .    General Appearance: healthy and alert, no distress     Musculoskeletal: extremities non tender and without edema    Skin: no lesions or rashes     Neurological: normal gait, no gross defects     Psychiatric: appropriate mood and affect                               Hematological: normal cervical, supraclavicular and inguinal lymph nodes     ABDOMEN:  Distended positive fluid wave.   PELVIC:  Normal external genitalia.  Normal-appearing vaginal mucosa.  Normal-appearing cervix.  There is a fixed pelvic mass.  Rectovaginal confirms.           Assessment:    Kayy Salazar is a 40 year old woman with a new diagnosis of pelvic mass and carcinomatosis.     A total of 60  minutes was spent with the patient, 40 minutes of which were spent in counseling the patient and/or treatment planning.    1.  Complex pelvic mass.   2.  Ascites.   3.  Carcinomatosis.   4.  CA-125 was 191.      I discussed with the patient as well as her  that this most likely looks like advanced ovarian cancer.  We will obtain a CEA,  and abdominal MRI to look at a possible liver lesion, which appears to be a hemangioma, as well as CT of the chest to rule out any chest disease.  Depending on those findings, we will proceed then with a diagnostic laparoscopy, biopsy and possible exploratory laparotomy, hysterectomy, bilateral salpingo-oophorectomy, tumor debulking and port placement.  We did discuss the concepts of primary debulking followed by adjuvant chemotherapy as well as IV and intraperitoneal chemotherapy, the advantages and disadvantages of those approaches as well as the possibility of neoadjuvant chemotherapy if we cannot cytoreduce her to R0 in an up-front setting.  The patient as well as her  agree with the plan.  We will have them see the stoma nurse as well as Anesthesia for preoperative optimization.  All questions were answered.  We will also have her consented for the Precision Medicine Program and Avatar Program.           Thank you for allowing us to participate in the care of your patient.         Sincerely,    Luis Ch MD, MS    Department of Obstetrics and Gynecology   Division of Gynecologic Oncology   [facility]  Phone: [phone]    CC  Patient Care Team:  [facility] as PCP - General (Clinic)  Aleisha Gonzalez APRN CNP as Assigned PCP  Karol Hairston MD as Referring Physician (Family Medicine)

## 2021-03-18 NOTE — NURSING NOTE
Surgery, blood and hysterectomy forms completed and scanned into HIM    Pre Op Nurse Teaching Completed     Relevant Diagnosis: Ovarian mass and ascites     Teaching Topic: Diagnostic laparoscopy, biopsies, possible HYSTERECTOMY, TOTAL, ABDOMINAL, WITH BILATERAL SALPINGO-OOPHORECTOMY, debulking, possible bowel resection, possible stoma, possible intra peritoneal port placement     Person(s) involved in teaching :  Patient  & spouse  Motivation Level:  Asks Questions:    Yes      Eager to Learn:     Yes     Cooperative:          Yes    Receptive (willing. Able to accept information):    Yes      Patient and those who are listed above demonstrates understanding of the following:   Reason for the appointment, diagnosis and treatment plan:   Yes   Knowledge of proper use of medications and conditions for which they are ordered (with special attention to potential side effects or drug interactions): Yes   Which situations necessitate calling provider and whom to contact: Yes         Nutritional needs and diet plan:  Yes      Pain management techniques:     Yes, Pain Scale   Diet:   Yes, [facility] Diet Instructions    Teaching Concerns addressed: Yes    Infection Prevention:  Patient and those who are listed above demonstrate understanding of the following:  Surgical procedure site care taught:   Yes   Signs and symptoms of infection taught: Yes       Instructional Materials Used/Given:  The [facility] Before You Surgery Booklet  Hysterectomy Guidelines  Pain Assessment Tool   Home Care after Major Abdominal or Vaginal Surgery  Map  Accommodations Brochure  Phone numbers for [facility] and [facility]  Copy of Surgical Consent    Comments:  JUNI Goyal RN

## 2021-03-18 NOTE — PROGRESS NOTES
Consult Notes on Referred Patient    Date: 3/18/2021       [place]  [address] [place] [address]       RE: Kayy Salazar  : 1980  YAN: 3/18/2021    Dear Dr. Karol Hairston:    I had the pleasure of seeing your patient Kayy Salazar here at the Gynecologic Cancer Clinic at the [facility] on 3/18/2021.  As you know she is a very pleasant 40 year old woman with a recent diagnosis of pelvic mass and carcinomatosis. Given these findings she was subsequently sent to the Gynecologic Cancer Clinic for new patient consultation.   G3, P3, had a  section followed by 2 VBACs Regular cycles every 27 days.  She had been on oral birth control pills in her early teens and 20s for about 6 years.  She has a long-standing history of intrabowel syndrome, started having more GI symptoms in late January followed by abdominal distention recently.  Recent CT shows ascites, possible carcinomatosis and a pelvic mass.  CA-125 is elevated to 191.  She has had some issues with vomiting and nausea since late January.  Reduced oral intake.  Some urinary frequency with abdominal distention.  She has lost about 8 pounds in the last couple of weeks.             Past Medical History:    Past Medical History:   Diagnosis Date     Endometriosis          Past Surgical History:    Past Surgical History:   Procedure Laterality Date     GYN SURGERY      c section      LAPAROSCOPIC ABLATION ENDOMETRIOSIS               Health Maintenance:  Health Maintenance Due   Topic Date Due     PREVENTIVE CARE VISIT  Never done     ADVANCE CARE PLANNING  Never done     HIV SCREENING  Never done     HEPATITIS C SCREENING  Never done     DTAP/TDAP/TD IMMUNIZATION (1 - Tdap) Never done     PAP  2016     INFLUENZA VACCINE (1) Never done       Normal Pap smears.  Had last colonoscopy in .           Current Medications:     has a current medication list which includes the  following prescription(s): acetaminophen and ondansetron.       Allergies:     [unfilled]        Social History:     Social History     Tobacco Use     Smoking status: Never Smoker     Smokeless tobacco: Never Used   Substance Use Topics     Alcohol use: No       History   Drug Use No           Family History:       Family History   Problem Relation Age of Onset     Blood Disease Mother         hemachromatosis      Diabetes Maternal Grandmother      Alzheimer Disease Maternal Grandmother      Multiple Sclerosis Maternal Grandmother      Pancreatic Cancer Paternal Grandmother      Leukemia Paternal Grandfather      The patient's daughter has leukemia and is currently treated at the age of 10.  Paternal grandfather had leukemia at age 65, and a maternal grandmother had pancreatic cancer at age 71.         Physical Exam:     /78   Pulse 76   Temp 98.7  F (37.1  C) (Oral)   Resp 18   Wt 52.4 kg (115 lb 8 oz)   LMP 03/11/2021   SpO2 99%   BMI 21.28 kg/m    Body mass index is 21.28 kg/m .    General Appearance: healthy and alert, no distress     Musculoskeletal: extremities non tender and without edema    Skin: no lesions or rashes     Neurological: normal gait, no gross defects     Psychiatric: appropriate mood and affect                               Hematological: normal cervical, supraclavicular and inguinal lymph nodes     ABDOMEN:  Distended positive fluid wave.   PELVIC:  Normal external genitalia.  Normal-appearing vaginal mucosa.  Normal-appearing cervix.  There is a fixed pelvic mass.  Rectovaginal confirms.           Assessment:    Kayy Salazar is a 40 year old woman with a new diagnosis of pelvic mass and carcinomatosis.     A total of 60 minutes was spent with the patient, 40 minutes of which were spent in counseling the patient and/or treatment planning.    1.  Complex pelvic mass.   2.  Ascites.   3.  Carcinomatosis.   4.  CA-125 was 191.      I discussed with the patient as well as her   that this most likely looks like advanced ovarian cancer.  We will obtain a CEA,  and abdominal MRI to look at a possible liver lesion, which appears to be a hemangioma, as well as CT of the chest to rule out any chest disease.  Depending on those findings, we will proceed then with a diagnostic laparoscopy, biopsy and possible exploratory laparotomy, hysterectomy, bilateral salpingo-oophorectomy, tumor debulking and port placement.  We did discuss the concepts of primary debulking followed by adjuvant chemotherapy as well as IV and intraperitoneal chemotherapy, the advantages and disadvantages of those approaches as well as the possibility of neoadjuvant chemotherapy if we cannot cytoreduce her to R0 in an up-front setting.  The patient as well as her  agree with the plan.  We will have them see the stoma nurse as well as Anesthesia for preoperative optimization.  All questions were answered.  We will also have her consented for the Precision Medicine Program and Avatar Program.           Thank you for allowing us to participate in the care of your patient.         Sincerely,    Luis Ch MD, MS    Department of Obstetrics and Gynecology   Division of Gynecologic Oncology   [facility]  Phone: [phone]    CC  Patient Care Team:  [facility] as PCP - General (Clinic)  Aleisha Gonzalez APRN CNP as Assigned PCP  Luis Ch MD as MD (Gynecologic Oncology)  Johana Hairston MD as Referring Physician (Family Medicine)  JOHANA HAIRSTON

## 2021-03-19 ENCOUNTER — HOSPITAL ENCOUNTER (OUTPATIENT)
Dept: CT IMAGING | Facility: CLINIC | Age: 41
End: 2021-03-19
Attending: OBSTETRICS & GYNECOLOGY
Payer: COMMERCIAL

## 2021-03-19 ENCOUNTER — HOSPITAL ENCOUNTER (OUTPATIENT)
Dept: MRI IMAGING | Facility: CLINIC | Age: 41
End: 2021-03-19
Attending: OBSTETRICS & GYNECOLOGY
Payer: COMMERCIAL

## 2021-03-19 DIAGNOSIS — R10.84 ABDOMINAL PAIN, GENERALIZED: ICD-10-CM

## 2021-03-19 DIAGNOSIS — N83.8 MASS OF LEFT OVARY: ICD-10-CM

## 2021-03-19 PROCEDURE — 74183 MRI ABD W/O CNTR FLWD CNTR: CPT | Mod: 26 | Performed by: RADIOLOGY

## 2021-03-19 PROCEDURE — 250N000011 HC RX IP 250 OP 636: Performed by: OBSTETRICS & GYNECOLOGY

## 2021-03-19 PROCEDURE — 71260 CT THORAX DX C+: CPT | Mod: 26 | Performed by: RADIOLOGY

## 2021-03-19 PROCEDURE — A9581 GADOXETATE DISODIUM INJ: HCPCS | Performed by: OBSTETRICS & GYNECOLOGY

## 2021-03-19 PROCEDURE — 71260 CT THORAX DX C+: CPT

## 2021-03-19 PROCEDURE — 250N000009 HC RX 250: Performed by: OBSTETRICS & GYNECOLOGY

## 2021-03-19 PROCEDURE — 255N000002 HC RX 255 OP 636: Performed by: OBSTETRICS & GYNECOLOGY

## 2021-03-19 PROCEDURE — 74183 MRI ABD W/O CNTR FLWD CNTR: CPT

## 2021-03-19 RX ORDER — IOPAMIDOL 755 MG/ML
100 INJECTION, SOLUTION INTRAVASCULAR ONCE
Status: COMPLETED | OUTPATIENT
Start: 2021-03-19 | End: 2021-03-19

## 2021-03-19 RX ADMIN — IOPAMIDOL 56 ML: 755 INJECTION, SOLUTION INTRAVENOUS at 14:10

## 2021-03-19 RX ADMIN — SODIUM CHLORIDE 59 ML: 9 INJECTION, SOLUTION INTRAVENOUS at 14:12

## 2021-03-19 RX ADMIN — GADOXETATE DISODIUM 10 ML: 181.43 INJECTION, SOLUTION INTRAVENOUS at 14:19

## 2021-03-19 NOTE — TELEPHONE ENCOUNTER
FUTURE VISIT INFORMATION      SURGERY INFORMATION:    Date: 4.12.21    Location: [facility] OR    Surgeon:  Jing    Anesthesia Type:  general    Procedure: Diagnostic laparoscopy, biopsies    Consult: 3.18.21    RECORDS REQUESTED FROM:         Most recent ECHO: 9.23.15

## 2021-03-20 ENCOUNTER — ANCILLARY PROCEDURE (OUTPATIENT)
Dept: ULTRASOUND IMAGING | Facility: CLINIC | Age: 41
End: 2021-03-20
Attending: EMERGENCY MEDICINE
Payer: COMMERCIAL

## 2021-03-20 ENCOUNTER — HOSPITAL ENCOUNTER (EMERGENCY)
Facility: CLINIC | Age: 41
Discharge: HOME | End: 2021-03-20
Attending: EMERGENCY MEDICINE | Admitting: EMERGENCY MEDICINE
Payer: COMMERCIAL

## 2021-03-20 VITALS
HEART RATE: 77 BPM | TEMPERATURE: 98.5 F | RESPIRATION RATE: 18 BRPM | SYSTOLIC BLOOD PRESSURE: 120 MMHG | DIASTOLIC BLOOD PRESSURE: 74 MMHG | OXYGEN SATURATION: 97 %

## 2021-03-20 DIAGNOSIS — N83.8 OVARIAN MASS: ICD-10-CM

## 2021-03-20 DIAGNOSIS — R18.0 MALIGNANT ASCITES (H): ICD-10-CM

## 2021-03-20 LAB
ALBUMIN FLD-MCNC: 3 G/DL
ALBUMIN SERPL-MCNC: 3.4 G/DL (ref 3.4–5)
ALP SERPL-CCNC: 44 U/L (ref 40–150)
ALT SERPL W P-5'-P-CCNC: 15 U/L (ref 0–50)
ANION GAP SERPL CALCULATED.3IONS-SCNC: 8 MMOL/L (ref 3–14)
AST SERPL W P-5'-P-CCNC: 12 U/L (ref 0–45)
BASOPHILS # BLD AUTO: 0 10E9/L (ref 0–0.2)
BASOPHILS NFR BLD AUTO: 0.5 %
BILIRUB SERPL-MCNC: 0.5 MG/DL (ref 0.2–1.3)
BUN SERPL-MCNC: 8 MG/DL (ref 7–30)
CALCIUM SERPL-MCNC: 8.6 MG/DL (ref 8.5–10.1)
CHLORIDE SERPL-SCNC: 105 MMOL/L (ref 94–109)
CO2 SERPL-SCNC: 24 MMOL/L (ref 20–32)
CREAT SERPL-MCNC: 0.7 MG/DL (ref 0.52–1.04)
DIFFERENTIAL METHOD BLD: ABNORMAL
EOSINOPHIL # BLD AUTO: 0.1 10E9/L (ref 0–0.7)
EOSINOPHIL NFR BLD AUTO: 0.8 %
ERYTHROCYTE [DISTWIDTH] IN BLOOD BY AUTOMATED COUNT: 16.4 % (ref 10–15)
GFR SERPL CREATININE-BSD FRML MDRD: >90 ML/MIN/{1.73_M2}
GLUCOSE BLDC GLUCOMTR-MCNC: 73 MG/DL (ref 70–99)
GLUCOSE SERPL-MCNC: 68 MG/DL (ref 70–99)
GRAM STN SPEC: NORMAL
HCT VFR BLD AUTO: 34.3 % (ref 35–47)
HGB BLD-MCNC: 10.4 G/DL (ref 11.7–15.7)
IMM GRANULOCYTES # BLD: 0 10E9/L (ref 0–0.4)
IMM GRANULOCYTES NFR BLD: 0.2 %
INR PPP: 1.11 (ref 0.86–1.14)
LYMPHOCYTES # BLD AUTO: 1.3 10E9/L (ref 0.8–5.3)
LYMPHOCYTES NFR BLD AUTO: 20.3 %
MCH RBC QN AUTO: 23.2 PG (ref 26.5–33)
MCHC RBC AUTO-ENTMCNC: 30.3 G/DL (ref 31.5–36.5)
MCV RBC AUTO: 76 FL (ref 78–100)
MONOCYTES # BLD AUTO: 0.3 10E9/L (ref 0–1.3)
MONOCYTES NFR BLD AUTO: 5.2 %
NEUTROPHILS # BLD AUTO: 4.8 10E9/L (ref 1.6–8.3)
NEUTROPHILS NFR BLD AUTO: 73 %
NRBC # BLD AUTO: 0 10*3/UL
NRBC BLD AUTO-RTO: 0 /100
PLATELET # BLD AUTO: 370 10E9/L (ref 150–450)
POTASSIUM SERPL-SCNC: 3.7 MMOL/L (ref 3.4–5.3)
PROT FLD-MCNC: 5.1 G/DL
PROT SERPL-MCNC: 6.7 G/DL (ref 6.8–8.8)
RBC # BLD AUTO: 4.49 10E12/L (ref 3.8–5.2)
SODIUM SERPL-SCNC: 136 MMOL/L (ref 133–144)
SPECIMEN SOURCE FLD: NORMAL
SPECIMEN SOURCE FLD: NORMAL
SPECIMEN SOURCE: NORMAL
WBC # BLD AUTO: 6.6 10E9/L (ref 4–11)

## 2021-03-20 PROCEDURE — 82042 OTHER SOURCE ALBUMIN QUAN EA: CPT | Performed by: EMERGENCY MEDICINE

## 2021-03-20 PROCEDURE — 87205 SMEAR GRAM STAIN: CPT | Performed by: EMERGENCY MEDICINE

## 2021-03-20 PROCEDURE — 80053 COMPREHEN METABOLIC PANEL: CPT | Performed by: EMERGENCY MEDICINE

## 2021-03-20 PROCEDURE — 49083 ABD PARACENTESIS W/IMAGING: CPT

## 2021-03-20 PROCEDURE — 87070 CULTURE OTHR SPECIMN AEROBIC: CPT | Performed by: EMERGENCY MEDICINE

## 2021-03-20 PROCEDURE — 88305 TISSUE EXAM BY PATHOLOGIST: CPT | Mod: TC | Performed by: EMERGENCY MEDICINE

## 2021-03-20 PROCEDURE — 85025 COMPLETE CBC W/AUTO DIFF WBC: CPT | Performed by: EMERGENCY MEDICINE

## 2021-03-20 PROCEDURE — 85610 PROTHROMBIN TIME: CPT | Performed by: EMERGENCY MEDICINE

## 2021-03-20 PROCEDURE — 99285 EMERGENCY DEPT VISIT HI MDM: CPT | Mod: 25 | Performed by: EMERGENCY MEDICINE

## 2021-03-20 PROCEDURE — 76705 ECHO EXAM OF ABDOMEN: CPT

## 2021-03-20 PROCEDURE — 88341 IMHCHEM/IMCYTCHM EA ADD ANTB: CPT | Mod: TC | Performed by: EMERGENCY MEDICINE

## 2021-03-20 PROCEDURE — 999N001014 HC STATISTIC CYTO WRIGHT STAIN TC: Performed by: EMERGENCY MEDICINE

## 2021-03-20 PROCEDURE — 99285 EMERGENCY DEPT VISIT HI MDM: CPT | Mod: 25

## 2021-03-20 PROCEDURE — 88112 CYTOPATH CELL ENHANCE TECH: CPT | Mod: TC | Performed by: EMERGENCY MEDICINE

## 2021-03-20 PROCEDURE — 49083 ABD PARACENTESIS W/IMAGING: CPT | Performed by: EMERGENCY MEDICINE

## 2021-03-20 PROCEDURE — 84157 ASSAY OF PROTEIN OTHER: CPT | Performed by: EMERGENCY MEDICINE

## 2021-03-20 PROCEDURE — 89051 BODY FLUID CELL COUNT: CPT | Performed by: EMERGENCY MEDICINE

## 2021-03-20 PROCEDURE — 76705 ECHO EXAM OF ABDOMEN: CPT | Mod: 59 | Performed by: EMERGENCY MEDICINE

## 2021-03-20 PROCEDURE — 250N000011 HC RX IP 250 OP 636: Performed by: EMERGENCY MEDICINE

## 2021-03-20 PROCEDURE — 88342 IMHCHEM/IMCYTCHM 1ST ANTB: CPT | Mod: TC | Performed by: EMERGENCY MEDICINE

## 2021-03-20 PROCEDURE — 96374 THER/PROPH/DIAG INJ IV PUSH: CPT | Mod: 59

## 2021-03-20 PROCEDURE — 999N001017 HC STATISTIC GLUCOSE BY METER IP

## 2021-03-20 PROCEDURE — 999N001018 HC STATISTIC H-CELL BLOCK W/STAIN: Performed by: EMERGENCY MEDICINE

## 2021-03-20 RX ORDER — ONDANSETRON 2 MG/ML
4 INJECTION INTRAMUSCULAR; INTRAVENOUS ONCE
Status: COMPLETED | OUTPATIENT
Start: 2021-03-20 | End: 2021-03-20

## 2021-03-20 RX ADMIN — ONDANSETRON 4 MG: 2 INJECTION INTRAMUSCULAR; INTRAVENOUS at 13:43

## 2021-03-20 ASSESSMENT — ENCOUNTER SYMPTOMS
NAUSEA: 1
ABDOMINAL PAIN: 1
FEVER: 0
SHORTNESS OF BREATH: 1
APPETITE CHANGE: 1
DIARRHEA: 0
BACK PAIN: 1

## 2021-03-20 NOTE — DISCHARGE INSTRUCTIONS
Thank you for coming to the [facility] Emergency Department.     Expect your abdominal wall to feel sore today.   Keep the bandage on for 24 hours, then replace it with a fresh bandaid until site is fully scabbed and scarred.   OK to shower tomorrow. Do not soak in a tub or swim until the wound is fully healed.     Return to the ER for: severe abdominal pain, fever >100.4F.    Follow up with your GYN Oncology team regarding the results of the fluid cytology testing.     On Tuesday you may cancel your schedule procedure on Wednesday, if you feel the fluid has not filled back up to cause return of discomfort.

## 2021-03-20 NOTE — ED PROVIDER NOTES
ED Provider Note  [facility]      History     Chief Complaint   Patient presents with     Abdominal Pain     Nausea     The history is provided by the patient and medical records.     Kayy Salazar is a 40-year-old female, she has been newly diagnosed with ovarian cancer.  She is scheduled for a diagnostic and therapeutic laparoscopy on April 12 her cancer is caused large volume abdominal ascites that is gone to the point is uncomfortable.  She is having a hard time eating despite taking Zofran, feels uncomfortable despite using Tylenol and oxycodone and feels slightly short of breath due to the pressure on her diaphragm.  No fever, no cough, no diarrhea, no known Covid exposures.     This part of the medical record was transcribed by Cheryl Cruz Scribzee, from a dictation done by Melanie Carreno MD.     CT Abdomen/Pelvis w/ Constrast 3/17/2021  1.  Left adnexal soft tissue mass associated with large volume ascites and foci of peritoneal thickening is suspicious for ovarian/peritoneal malignancy. Recommend gyne-oncology consultation.  2.  4.1 cm posterior uterine mass is likely a fibroid. Enhancing nodule on the surface of the uterine fundus could be a subserosal fibroid or peritoneal deposit.  3.  Focal ill-defined hypoenhancement in the liver, near the falciform ligament, could be due to focal fatty infiltration versus peritoneal deposit. Enhancing lesion in segment 8 is favored to be a hemangioma but needs confirmation with MRI.  4.  Subcentimeter pelvic lymph nodes are indeterminate.    MR Abdomen w/o Contrast 3/19/2021  Liver: Noncirrhotic liver morphology   Gallbladder: Layering sludge. No gallbladder wall thickening   Spleen: Not enlarged measuring 9.6 cm. A small accessory spleen (series 14, image 74).   Kidneys: No hydronephrosis. Nonenhancing T2 hyperintense foci in the kidneys   Adrenal glands: No suspicious adrenal nodule or mass   Pancreas: Preserved T1 signal  intensity of the pancreas. No pancreatic duct dilatation.   Bowel: Apparent thickening of jejunal loops in the central left abdomen (series 3, image 19) likely secondary to underdistention.   Lymph nodes: No significant abdominal lymphadenopathy   Blood vessels: Patent major abdominal vasculature   Lung bases: No pleural effusion, mass or consolidation   Bones and soft tissues: No aggressive osseous lesion   Mesentery and abdominal wall: Mild omental thickening ( series 8, image 55)   Ascites: Moderate to large  ascites    Past Medical History  Past Medical History:   Diagnosis Date     Endometriosis      Past Surgical History:   Procedure Laterality Date     GYN SURGERY      c section      LAPAROSCOPIC ABLATION ENDOMETRIOSIS      2007     ACETAMINOPHEN PO  ondansetron (ZOFRAN) 4 MG tablet  oxyCODONE (ROXICODONE) 5 MG tablet      Allergies   Allergen Reactions     Amoxicillin Rash     Vicodin [Hydrocodone-Acetaminophen] Rash     Family History  Family History   Problem Relation Age of Onset     Blood Disease Mother         hemachromatosis      Diabetes Maternal Grandmother      Alzheimer Disease Maternal Grandmother      Multiple Sclerosis Maternal Grandmother      Pancreatic Cancer Paternal Grandmother      Leukemia Paternal Grandfather      Social History   Social History     Tobacco Use     Smoking status: Never Smoker     Smokeless tobacco: Never Used   Substance Use Topics     Alcohol use: No     Drug use: No      Past medical history, past surgical history, medications, allergies, family history, and social history were reviewed with the patient. No additional pertinent items.       Review of Systems   Constitutional: Positive for appetite change (Decreased). Negative for fever.   Respiratory: Positive for shortness of breath.    Gastrointestinal: Positive for abdominal pain and nausea. Negative for diarrhea.   Musculoskeletal: Positive for back pain.   All other systems reviewed and are negative.    A  complete review of systems was performed with pertinent positives and negatives noted in the HPI, and all other systems negative.    Physical Exam   BP: 117/81  Pulse: 86  Temp: 98.9  F (37.2  C)  Resp: 18  SpO2: 100 %     Physical Exam  Gen:A&Ox3, uncomfortable.  HEENT:PERRL, no facial tenderness or wounds, head atraumatic  Neck:no bony tenderness or step offs, no JVD, trachea midline  Back: no CVA tenderness, no midline bony tenderness  CV:RRR without murmurs  PULM:Clear to auscultation bilaterally, work of breathing not visibly labored.   Abd:soft, distended with fluid wave. Non-tender.   UE:No traumatic injuries, skin normal  LE:no traumatic injuries, skin normal, no LE edema.   Neuro:CN II-XII intact, strength 5/5 throughout, gait stable.   Skin: no rashes or ecchymoses    ED [facility]    -Paracentesis    Date/Time: 3/20/2021 2:40 PM  Performed by: Melanie Carreno MD  Authorized by: Melanie Carreno MD       PRE-PROCEDURE DETAILS     Procedure purpose:  Therapeutic    ANESTHESIA (see MAR for exact dosages):     Anesthesia method:  Local infiltration    Local anesthetic:  Lidocaine 1% WITH epi    PROCEDURE DETAILS     Needle gauge:  18    Ultrasound guidance: yes      Puncture site:  R lower quadrant    Fluid removed amount:  2650    Fluid appearance:  Rachele    Dressing:  Adhesive bandage    PROCEDURE   Patient Tolerance:  Patient tolerated the procedure well with no immediate complications        Results for orders placed during the hospital encounter of 03/20/21   POC US ABDOMEN LIMITED    Impression Limited Bedside Abdominal Ultrasound, performed and interpreted by me.     Indication: Abdominal swelling and abdominal pain. Evaluate for Free fluid.     With the patient supine and on right side, the RLQ was examined for free fluid.    Findings: Large volume ascites present    IMPRESSION: Free fluid present as noted above.                       Results for orders placed or performed during the hospital encounter of 03/20/21   POC US ABDOMEN LIMITED     Status: None    Impression    Limited Bedside Abdominal Ultrasound, performed and interpreted by me.     Indication: Abdominal swelling and abdominal pain. Evaluate for Free fluid.     With the patient supine and on right side, the RLQ was examined for free fluid.    Findings: Large volume ascites present    IMPRESSION: Free fluid present as noted above.     CBC with platelets differential     Status: Abnormal   Result Value Ref Range    WBC 6.6 4.0 - 11.0 10e9/L    RBC Count 4.49 3.8 - 5.2 10e12/L    Hemoglobin 10.4 (L) 11.7 - 15.7 g/dL    Hematocrit 34.3 (L) 35.0 - 47.0 %    MCV 76 (L) 78 - 100 fl    MCH 23.2 (L) 26.5 - 33.0 pg    MCHC 30.3 (L) 31.5 - 36.5 g/dL    RDW 16.4 (H) 10.0 - 15.0 %    Platelet Count 370 150 - 450 10e9/L    Diff Method Automated Method     % Neutrophils 73.0 %    % Lymphocytes 20.3 %    % Monocytes 5.2 %    % Eosinophils 0.8 %    % Basophils 0.5 %    % Immature Granulocytes 0.2 %    Nucleated RBCs 0 0 /100    Absolute Neutrophil 4.8 1.6 - 8.3 10e9/L    Absolute Lymphocytes 1.3 0.8 - 5.3 10e9/L    Absolute Monocytes 0.3 0.0 - 1.3 10e9/L    Absolute Eosinophils 0.1 0.0 - 0.7 10e9/L    Absolute Basophils 0.0 0.0 - 0.2 10e9/L    Abs Immature Granulocytes 0.0 0 - 0.4 10e9/L    Absolute Nucleated RBC 0.0    Comprehensive metabolic panel     Status: Abnormal   Result Value Ref Range    Sodium 136 133 - 144 mmol/L    Potassium 3.7 3.4 - 5.3 mmol/L    Chloride 105 94 - 109 mmol/L    Carbon Dioxide 24 20 - 32 mmol/L    Anion Gap 8 3 - 14 mmol/L    Glucose 68 (L) 70 - 99 mg/dL    Urea Nitrogen 8 7 - 30 mg/dL    Creatinine 0.70 0.52 - 1.04 mg/dL    GFR Estimate >90 >60 mL/min/[1.73_m2]    GFR Estimate If Black >90 >60 mL/min/[1.73_m2]    Calcium 8.6 8.5 - 10.1 mg/dL    Bilirubin Total 0.5 0.2 - 1.3 mg/dL    Albumin 3.4 3.4 - 5.0 g/dL    Protein Total 6.7 (L) 6.8 - 8.8 g/dL    Alkaline  Phosphatase 44 40 - 150 U/L    ALT 15 0 - 50 U/L    AST 12 0 - 45 U/L   INR     Status: None   Result Value Ref Range    INR 1.11 0.86 - 1.14   Cell count with differential fluid     Status: None   Result Value Ref Range    Body Fluid Analysis Source Ascites     Color Fluid Orange     Appearance Fluid Cloudy     WBC Fluid 2449 /uL     Medications   ondansetron (ZOFRAN) injection 4 mg (4 mg Intravenous Given 3/20/21 1343)        Assessments & Plan (with Medical Decision Making)   40-year-old female with new diagnosis ovarian cancer presenting with large volume ascites causing discomfort.  She arrives afebrile and hemodynamically stable.  IV access was obtained and laboratory testing for CBC, comprehensive metabolic panel and INR sent.  The case was discussed with GYN/Oncology.   Therapeutic and diagnostic paracentesis performed. 2650 mL removed, with fluid appearing salvador and cloudy.   Tolerated the procedure well.   Discharged home with follow up with GYN Onc. Ascites fluid cytology pending.     This part of the medical record was transcribed by Cheryl Cruz Scribe, from a dictation done by Melanie Carreno MD.     I have reviewed the nursing notes. I have reviewed the findings, diagnosis, plan and need for follow up with the patient.    New Prescriptions    No medications on file       Final diagnoses:   Malignant ascites   Ovarian mass       --  Melanie Carreno MD [facility] EMERGENCY DEPARTMENT  3/20/2021     Melanie Carreno MD  03/20/21 1522

## 2021-03-20 NOTE — ED NOTES
MD aware of BG 68. Monte[place] juice given to pt. Will recheck BG prior to discharge. Pt resting in bed after pericentesis.

## 2021-03-20 NOTE — ED TRIAGE NOTES
Pt presents ambulatory to triage with c/o of abdominal pain, back pain, and nausea. Pt was diagnosed with ovarian cancer this week and is scheduled for a paracinesis on Wednesday, but pt reports that the pain and nausea are preventing her from eating or sleeping and that she is starting to become SOB. Pt took tylenol 1000mg this morning at 0800 and zofran 4mg at about 0500.  Fallon Horton RN on 3/20/2021 at 12:37 PM

## 2021-03-22 DIAGNOSIS — Z11.59 ENCOUNTER FOR SCREENING FOR OTHER VIRAL DISEASES: ICD-10-CM

## 2021-03-22 LAB
APPEARANCE FLD: NORMAL
COLOR FLD: NORMAL
LYMPHOCYTES NFR FLD MANUAL: 7 %
NEUTS BAND NFR FLD MANUAL: 9 %
OTHER CELLS FLD MANUAL: 84 %
SPECIMEN SOURCE FLD: NORMAL
WBC # FLD AUTO: 2449 /UL

## 2021-03-22 PROCEDURE — 88112 CYTOPATH CELL ENHANCE TECH: CPT | Mod: 26 | Performed by: PATHOLOGY

## 2021-03-22 PROCEDURE — 88342 IMHCHEM/IMCYTCHM 1ST ANTB: CPT | Mod: 26 | Performed by: PATHOLOGY

## 2021-03-22 PROCEDURE — 88341 IMHCHEM/IMCYTCHM EA ADD ANTB: CPT | Mod: 26 | Performed by: PATHOLOGY

## 2021-03-22 PROCEDURE — 88305 TISSUE EXAM BY PATHOLOGIST: CPT | Mod: 26 | Performed by: PATHOLOGY

## 2021-03-23 ENCOUNTER — DOCUMENTATION ONLY (OUTPATIENT)
Dept: ONCOLOGY | Facility: CLINIC | Age: 41
End: 2021-03-23

## 2021-03-23 ENCOUNTER — PATIENT OUTREACH (OUTPATIENT)
Dept: ONCOLOGY | Facility: CLINIC | Age: 41
End: 2021-03-23

## 2021-03-23 DIAGNOSIS — Z11.59 ENCOUNTER FOR SCREENING FOR OTHER VIRAL DISEASES: ICD-10-CM

## 2021-03-23 NOTE — PROGRESS NOTES
Surgery is scheduled with Dr. Ch on 4/6 at [place].  Scheduled per availability.    H&P: to be completed by PAC: virtual visit on 3/30    Additional appointments:   Ostomy marking on 4/1 at 11:30 AM  Labs on 4/1 at 12:45 PM    COVID-19 test: 4/2 at [place]    Post-op: 4/21 as a virtual visit .    The RN completed the education regarding the surgery.     Patient will receive surgery arrival and start time from PAC.    The surgery packet was sent via [employer].    Patient will complete COVID-19 test that was scheduled by surgical coordinator 2-4 days prior to surgery.     I sent a QXL ricardo plchart to confirm the information above, will call if the patient requests.

## 2021-03-24 LAB — COPATH REPORT: NORMAL

## 2021-03-24 NOTE — PROGRESS NOTES
Reviewed pt concerns with NP   Likely rash from disinfectant soap from procedure  Use OTC benadryl tabs or creams to area, use water only to rash area avoid soaps  Ok to continue on oxycodone  Call if rash worsens or does not resolve      Pt informed on md plan and will call if further issues

## 2021-03-24 NOTE — PROGRESS NOTES
Pt calling with concerns   Rash started yesterday on abdomen below ribs, wrap around back   Rash is red slightly raised/bumpy notes itching  Nothing new as far as foods or household products,clothing  Does have history of percocet allergy  Did start pain meds oxycodone las Thursday  Pt wonders if fluid removal from paracentesis caused rash or something in the cancer    Feeling much more comfortable since paracentesis    Explained to pt that it is not caused by cancer or fluid removaely to be from oxycodone since rash did not start til  Yesterday.   Explained could be due to what ever antiseptic soap was used in paracentesis  Will check with NP to see if she has any thoughts

## 2021-03-25 LAB
BACTERIA SPEC CULT: NO GROWTH
SPECIMEN SOURCE: NORMAL

## 2021-03-30 ENCOUNTER — ANESTHESIA EVENT (OUTPATIENT)
Dept: SURGERY | Facility: CLINIC | Age: 41
DRG: 737 | End: 2021-03-30
Payer: COMMERCIAL

## 2021-03-30 ENCOUNTER — VIRTUAL VISIT (OUTPATIENT)
Dept: SURGERY | Facility: CLINIC | Age: 41
End: 2021-03-30
Payer: COMMERCIAL

## 2021-03-30 DIAGNOSIS — Z01.818 PRE-OP EVALUATION: Primary | ICD-10-CM

## 2021-03-30 PROCEDURE — 99203 OFFICE O/P NEW LOW 30 MIN: CPT | Mod: 95 | Performed by: PHYSICIAN ASSISTANT

## 2021-03-30 ASSESSMENT — LIFESTYLE VARIABLES: TOBACCO_USE: 0

## 2021-03-30 ASSESSMENT — PAIN SCALES - GENERAL: PAINLEVEL: MODERATE PAIN (4)

## 2021-03-30 NOTE — H&P
Pre-Operative H & P         Video-Visit Details    Type of service:  Video Visit    Patient verbally consented to video service today: YES      Video Start Time: 0839  Video End Time (time video stopped): 0852    Originating Location (pt. Location): Home    Distant Location (provider location): home    Mode of Communication:  Video Conference via "[employer]"      CC:  Preoperative exam to assess for increased cardiopulmonary risk while undergoing surgery and anesthesia.    Date of Encounter: 3/30/2021  Primary Care Physician:  [facility]  Associated diagnosis: mass of left ovary    HPI  Kayy Salazar is a 40 year old female who presents for pre-operative H & P in preparation for Diagnostic laparoscopy, biopsies, possible HYSTERECTOMY, TOTAL, ABDOMINAL, WITH BILATERAL SALPINGO-OOPHORECTOMY, debulking, possible bowel resection, possible stoma, possible intraperitoneal port placement with Dr. Ch on 4/6/21 at [facility]. Patient is being evaluated for comorbid conditions of multiple joint pain, IBS      Ms. Salazar was recently diagnosed with a pelvic mass and carcinomatosis. She noticed increasing GI symptoms in January and underwent CT that showed ascites, possible carcinomatosis and a pelvic mass. She was seen by Dr. Ch for further evaluation. She is s/p paracentesis and reports her abdominal discomfort has improved. She is now scheduled for the above procedure.      History is obtained from the patient and chart review. Patient's  was present for the visit.       Past Medical History  Past Medical History:   Diagnosis Date     Endometriosis        Past Surgical History  Past Surgical History:   Procedure Laterality Date     GYN SURGERY      c section      LAPAROSCOPIC ABLATION ENDOMETRIOSIS      2007       Hx of Blood transfusions/reactions: denies     Hx of abnormal bleeding or anti-platelet use: denies    Menstrual history:  Patient's last menstrual period was 03/11/2021.    Steroid use in the last year: denies    Personal or FH with difficulty with Anesthesia:  Patient has a history of PONV, but has no family history of anesthesia complications.      Prior to Admission Medications  Current Outpatient Medications   Medication Sig Dispense Refill     ACETAMINOPHEN PO Take 325-650 mg by mouth every 6 hours as needed for mild pain       OXYCODONE ER PO Take 5 mg by mouth At Bedtime         Allergies  Allergies   Allergen Reactions     Amoxicillin Rash     Vicodin [Hydrocodone-Acetaminophen] Rash       Social History  Social History     Socioeconomic History     Marital status:      Spouse name: Not on file     Number of children: Not on file     Years of education: Not on file     Highest education level: Not on file   Occupational History     Not on file   Social Needs     Financial resource strain: Not on file     Food insecurity     Worry: Not on file     Inability: Not on file     Transportation needs     Medical: Not on file     Non-medical: Not on file   Tobacco Use     Smoking status: Never Smoker     Smokeless tobacco: Never Used   Substance and Sexual Activity     Alcohol use: No     Drug use: No     Sexual activity: Yes     Partners: Male   Lifestyle     Physical activity     Days per week: Not on file     Minutes per session: Not on file     Stress: Not on file   Relationships     Social connections     Talks on phone: Not on file     Gets together: Not on file     Attends [employer] service: Not on file     Active member of club or organization: Not on file     Attends meetings of clubs or organizations: Not on file     Relationship status: Not on file     Intimate partner violence     Fear of current or ex partner: Not on file     Emotionally abused: Not on file     Physically abused: Not on file     Forced sexual activity: Not on file   Other Topics Concern     Parent/sibling w/ CABG, MI or angioplasty before 65F 55M?  Not Asked   Social History Narrative     Not on file       Family History  Family History   Problem Relation Age of Onset     Blood Disease Mother         hemachromatosis      Diabetes Maternal Grandmother      Alzheimer Disease Maternal Grandmother      Multiple Sclerosis Maternal Grandmother      Pancreatic Cancer Paternal Grandmother      Leukemia Paternal Grandfather        ROS/MED HX  ENT/Pulmonary:    (-) tobacco use   Neurologic:  - neg neurologic ROS     Cardiovascular:     (+) -----Previous cardiac testing   Echo: Date: 9/2015 Results:  No apparent explanation for patient's murmur noted on this study. Normal   biventricular systolic function. LVEF estimate 60-65%. No significant   valvular abnormalities. Normal IVC with preserved respiratory variability.     Stress Test: Date: Results:    ECG Reviewed: Date: Results:    Cath: Date: Results:   (-) taking anticoagulants/antiplatelets   METS/Exercise Tolerance:     Hematologic:  - neg hematologic  ROS  (-) history of blood transfusion   Musculoskeletal:   (+) arthritis,     GI/Hepatic: Comment: IBS      Renal/Genitourinary:  - neg Renal ROS     Endo:  - neg endo ROS     Psychiatric/Substance Use:  - neg psychiatric ROS     Infectious Disease:  - neg infectious disease ROS     Malignancy: Comment: Left ovarian mass      Other:            The complete review of systems is negative other than noted in the HPI or here.    0 lbs 0 oz  Data Unavailable   There is no height or weight on file to calculate BMI.       Physical Exam  Constitutional: Awake, alert, cooperative, no apparent distress, and appears stated age.  Respiratory: non labored breathing   Neuropsychiatric: Calm, cooperative. Normal affect.     Please refer to the physical examination documented by the anesthesiologist in the anesthesia record on the day of surgery    Labs: (personally reviewed)  Component      Latest Ref Rng & Units 3/20/2021   WBC      4.0 - 11.0 10e9/L 6.6   RBC Count      3.8 - 5.2  10e12/L 4.49   Hemoglobin      11.7 - 15.7 g/dL 10.4 (L)   Hematocrit      35.0 - 47.0 % 34.3 (L)   MCV      78 - 100 fl 76 (L)   MCH      26.5 - 33.0 pg 23.2 (L)   MCHC      31.5 - 36.5 g/dL 30.3 (L)   RDW      10.0 - 15.0 % 16.4 (H)   Platelet Count      150 - 450 10e9/L 370   Diff Method       Automated Method   % Neutrophils      % 73.0   % Lymphocytes      % 20.3   % Monocytes      % 5.2   % Eosinophils      % 0.8   % Basophils      % 0.5   % Immature Granulocytes      % 0.2   Nucleated RBCs      0 /100 0   Absolute Neutrophil      1.6 - 8.3 10e9/L 4.8   Absolute Lymphocytes      0.8 - 5.3 10e9/L 1.3   Absolute Monocytes      0.0 - 1.3 10e9/L 0.3   Absolute Eosinophils      0.0 - 0.7 10e9/L 0.1   Absolute Basophils      0.0 - 0.2 10e9/L 0.0   Abs Immature Granulocytes      0 - 0.4 10e9/L 0.0   Absolute Nucleated RBC       0.0   Sodium      133 - 144 mmol/L 136   Potassium      3.4 - 5.3 mmol/L 3.7   Chloride      94 - 109 mmol/L 105   Carbon Dioxide      20 - 32 mmol/L 24   Anion Gap      3 - 14 mmol/L 8   Glucose      70 - 99 mg/dL 68 (L)   Urea Nitrogen      7 - 30 mg/dL 8   Creatinine      0.52 - 1.04 mg/dL 0.70   GFR Estimate      >60 mL/min/1.73:m2 >90   GFR Estimate If Black      >60 mL/min/1.73:m2 >90   Calcium      8.5 - 10.1 mg/dL 8.6   Bilirubin Total      0.2 - 1.3 mg/dL 0.5   Albumin      3.4 - 5.0 g/dL 3.4   Protein Total      6.8 - 8.8 g/dL 6.7 (L)   Alkaline Phosphatase      40 - 150 U/L 44   ALT      0 - 50 U/L 15   AST      0 - 45 U/L 12   INR      0.86 - 1.14 1.11     ECHO 9/2015   No apparent explanation for patient's murmur noted on this study. Normal   biventricular systolic function. LVEF estimate 60-65%. No significant   valvular abnormalities. Normal IVC with preserved respiratory variability.       ASSESSMENT and PLAN  Kayy Salazar is a 40 year old female scheduled for Diagnostic laparoscopy, biopsies, possible HYSTERECTOMY, TOTAL, ABDOMINAL, WITH BILATERAL SALPINGO-OOPHORECTOMY,  debulking, possible bowel resection, possible stoma, possible intraperitoneal port placement on 4/6/21 by Dr. Ch in treatment of left ovarian mass.  PAC referral for risk assessment and optimization for anesthesia with comorbid conditions of multiple joint pain, IBS:          Pre-operative considerations:     1.  Cardiac:  Functional status- METS >4- was running 4-5 miles daily up until the past month. denies cardiac symptoms.  Previous cardiac testing as above. intermediate risk surgery with 0.9% (RCRI #) risk of major adverse cardiac event.          2.  Pulm: CHRISTIAN risk: low. non smoker. denies pulmonary symptoms.         3.  GI:  H/o PONV, anti-emetic intervention recommended.  h/o IBS. recent ascites s/p paracentesis.         4.    onc: recently diagnosed with an ovarian mass with the above procedure now planned.         VTE risk: 0.26-1.8%        Patient is optimized and is acceptable candidate for the proposed procedure.  No further diagnostic evaluation is needed.         **Physical exam and vital signs not completed today as this visit was scheduled as a virtual visit during Covid 19 pandemic. Physical exam should be completed the DOS in pre-op**    35 minutes were spent completing chart review, seeing the patient, reviewing labs and test results, discussing patient care with anesthesia      Kaylah Hernandes PA-C  Preoperative Assessment Center  [facility]  Phone: [phone]  Fax: [phone]

## 2021-03-30 NOTE — ANESTHESIA PREPROCEDURE EVALUATION
Anesthesia Pre-Procedure Evaluation    Patient: Kayy Salazar   MRN: [id number] : 1980        Preoperative Diagnosis: Mass of left ovary [N83.8]   Procedure : Procedure(s):  Diagnostic laparoscopy, biopsies  possible HYSTERECTOMY, TOTAL, ABDOMINAL, WITH BILATERAL SALPINGO-OOPHORECTOMY, debulking, possible bowel resection, possible stoma, possible intraperitoneal port placement     Past Medical History:   Diagnosis Date     Endometriosis       Past Surgical History:   Procedure Laterality Date     GYN SURGERY      c section      HC TOOTH EXTRACTION W/FORCEP       LAPAROSCOPIC ABLATION ENDOMETRIOSIS      2007      Allergies   Allergen Reactions     Amoxicillin Rash     Vicodin [Hydrocodone-Acetaminophen] Rash      Social History     Tobacco Use     Smoking status: Never Smoker     Smokeless tobacco: Never Used   Substance Use Topics     Alcohol use: No      Wt Readings from Last 1 Encounters:   21 52.4 kg (115 lb 8 oz)        Anesthesia Evaluation   Pt has had prior anesthetic. Type: General.    History of anesthetic complications  - PONV.      ROS/MED HX  ENT/Pulmonary:    (-) tobacco use, asthma and COPD   Neurologic:  - neg neurologic ROS  (-) no CVA and no TIA   Cardiovascular:     (+) -----Previous cardiac testing   Echo: Date: 2015 Results:  No apparent explanation for patient's murmur noted on this study. Normal  biventricular systolic function. LVEF estimate 60-65%. No significant  valvular abnormalities. Normal IVC with preserved respiratory variability.    Stress Test: Date: Results:    ECG Reviewed: Date: Results:    Cath: Date: Results:   (-) hypertension, taking anticoagulants/antiplatelets and dyslipidemia   METS/Exercise Tolerance: >4 METS Comment: 4-5 miles running daily prior to the past month   Hematologic:     (+) anemia,  (-) history of blood transfusion   Musculoskeletal:  - neg musculoskeletal ROS  (-) arthritis   GI/Hepatic: Comment: IBS   (-) GERD, liver disease and  esophageal disease   Renal/Genitourinary:  - neg Renal ROS  (-) renal disease   Endo:  - neg endo ROS  (-) Type II DM, thyroid disease and chronic steroid usage   Psychiatric/Substance Use:  - neg psychiatric ROS     Infectious Disease: Comment: COVID NEGATIVE 4/2/21 - neg infectious disease ROS     Malignancy: Comment: Left ovarian mass with carcinomatosis  (+) Malignancy, History of Other.Other CA ovarian mass status post.    Other:            Physical Exam    Airway  airway exam normal           Respiratory Devices and Support         Dental  no notable dental history         Cardiovascular   cardiovascular exam normal          Pulmonary   pulmonary exam normal                OUTSIDE LABS:  CBC:   Lab Results   Component Value Date    WBC 6.6 03/20/2021    WBC 5.5 03/17/2021    HGB 10.4 (L) 03/20/2021    HGB 11.3 (L) 03/17/2021    HCT 34.3 (L) 03/20/2021    HCT 37.1 03/17/2021     03/20/2021     03/17/2021     BMP:   Lab Results   Component Value Date     04/01/2021     03/20/2021    POTASSIUM 4.0 04/01/2021    POTASSIUM 3.7 03/20/2021    CHLORIDE 104 04/01/2021    CHLORIDE 105 03/20/2021    CO2 27 04/01/2021    CO2 24 03/20/2021    BUN 14 04/01/2021    BUN 8 03/20/2021    CR 0.69 04/01/2021    CR 0.70 03/20/2021    GLC 86 04/01/2021    GLC 68 (L) 03/20/2021     COAGS:   Lab Results   Component Value Date    INR 1.11 03/20/2021     POC:   Lab Results   Component Value Date    BGM 73 03/20/2021    HCG Negative 03/17/2021     HEPATIC:   Lab Results   Component Value Date    ALBUMIN 3.5 04/01/2021    PROTTOTAL 7.0 04/01/2021    ALT 20 04/01/2021    AST 12 04/01/2021    ALKPHOS 49 04/01/2021    BILITOTAL 0.3 04/01/2021     OTHER:   Lab Results   Component Value Date    SEBAS 9.0 04/01/2021    TSH 1.47 03/10/2021    CRP <2.9 09/17/2015    SED 5 09/17/2015       Anesthesia Plan    ASA Status:  3      Anesthesia Type: General.     - Airway: ETT   Induction: Intravenous.   Maintenance: Balanced.    Techniques and Equipment:     - Lines/Monitors: 2nd IV, BIS     Consents    Anesthesia Plan(s) and associated risks, benefits, and realistic alternatives discussed. Questions answered and patient/representative(s) expressed understanding.     - Discussed with:  Patient      - Extended Intubation/Ventilatory Support Discussed: No.      - Patient is DNR/DNI Status: No         Postoperative Care    Pain management: Multi-modal analgesia.   PONV prophylaxis: Ondansetron (or other 5HT-3), Dexamethasone or Solumedrol, Scopolamine patch     Comments:    41yo F w/ pelvic mass and peritoneal carcinomatosis presents to the OR for ERICA-BSO with diagnostic laparoscopy.          PAC Discussion and Assessment    ASA Classification: 3  Case is suitable for: [place]  Anesthetic techniques and relevant risks discussed: GA                  PAC Resident/NP Anesthesia Assessment: Kayy Salazar is a 40 year old female scheduled for Diagnostic laparoscopy, biopsies, possible HYSTERECTOMY, TOTAL, ABDOMINAL, WITH BILATERAL SALPINGO-OOPHORECTOMY, debulking, possible bowel resection, possible stoma, possible intraperitoneal port placement on 4/6/21 by Dr. Ch in treatment of left ovarian mass.  PAC referral for risk assessment and optimization for anesthesia with comorbid conditions of multiple joint pain, IBS:          Pre-operative considerations:     1.  Cardiac:  Functional status- METS >4- was running 4-5 miles daily up until the past month. denies cardiac symptoms.  Previous cardiac testing as above. intermediate risk surgery with 0.9% (RCRI #) risk of major adverse cardiac event.          2.  Pulm: CHRISTIAN risk: low. non smoker. denies pulmonary symptoms.         3.  GI:  H/o PONV, anti-emetic intervention recommended.  h/o IBS. recent ascites s/p paracentesis.         4.    onc: recently diagnosed with an ovarian mass with the above procedure now planned.         VTE risk: 0.26-1.8%        Patient is optimized and is  acceptable candidate for the proposed procedure.  No further diagnostic evaluation is needed.         **Physical exam and vital signs not completed today as this visit was scheduled as a virtual visit during Covid 19 pandemic. Physical exam should be completed the DOS in pre-op**       **For further details of assessment and testing please see H and P completed on same date.               TULIO Hale MD

## 2021-03-30 NOTE — PATIENT INSTRUCTIONS
Preparing for Your Surgery      Name:  Kayy Salazar   MRN:  [id number]   :  1980   Today's Date:  3/30/2021       Arriving for surgery:  Surgery date:  2021  Arrival time:  6:45 am    Restrictions due to COVID 19:  One consistent visitor per patient is allowed.  The visitor will be allowed in the pre-op area.  Visitors are asked to leave the building during the surgery.  No ill visitors.  All visitors must wear face mask.    [employer] parking is available for anyone with mobility limitations or disabilities.  ([place]  24 hours/ 7 days a week; [facility]  7 am- 3:30 pm, Mon- Fri)    Please come to:     [facility] [address]  [address] [place]  [address]       -    Please proceed to [address] on the 3rd floor. [phone]?     - ?If you are in need of directions, wheelchair or escort please stop at the Information Desk in the lobby.       What can I eat or drink?  -  You may eat and drink normally for up to 8 hours before your surgery. (Until Midnight )  -  You may have clear liquids until 2 hours before surgery. (Until 6:45 am arrival time )    Examples of clear liquids:  Water  Clear broth  Juices (apple, white grape, white cranberry  and cider) without pulp  Noncarbonated, powder based beverages  (lemonade and Christo-Aid)  Sodas (Sprite, 7-Up, ginger ale and seltzer)  Coffee or tea (without milk or cream)  Gatorade    -  No Alcohol for at least 24 hours before surgery     Which medicines can I take?    Hold Aspirin for 7 days before surgery.   Hold Multivitamins for 7 days before surgery.  Hold Supplements for 7 days before surgery.  Hold Ibuprofen (Advil, Motrin) for 1 day before surgery--unless otherwise directed by surgeon.  Hold Naproxen (Aleve) for 4 days before surgery.      -  PLEASE TAKE these medications the day of surgery:  Acetaminophen if needed       How do I prepare myself?  - Please take 2 showers before surgery using  Scrubcare or Hibiclens soap.    Use this soap only from the neck to your toes.     Leave the soap on your skin for one minute--then rinse thoroughly.      You may use your own shampoo and conditioner; no other hair products.   - Please remove all jewelry and body piercings.  - No lotions, deodorants or fragrance.  - No makeup or fingernail polish.   - Bring your ID and insurance card.    - All patients are required to have a Covid-19 test within 4 days of surgery/procedure.      -Patients will be contacted by the [facility] scheduling team within 1 week of surgery to make an appointment.      - Patients may call the Scheduling team at [phone] if they have not been scheduled within 4 days of  surgery.        Questions or Concerns:    - For any questions regarding the day of surgery or your hospital stay, please contact the Pre Admission Nursing Office at [phone].       - If you have health changes between today and your surgery please call your surgeon.       For questions after surgery please call your surgeons office.

## 2021-03-30 NOTE — PROGRESS NOTES
Kayy is a 40 year old who is being evaluated via a billable video visit.      How would you like to obtain your AVS? MyChart    Will anyone else be joining your video visit? No      HPI         Review of Systems         Objective    Vitals - Patient Reported  Pain Score: Moderate Pain (4)  Pain Loc: Abdomen        Physical Exam     N Kip YEAGER

## 2021-04-01 ENCOUNTER — OFFICE VISIT (OUTPATIENT)
Dept: WOUND CARE | Facility: CLINIC | Age: 41
End: 2021-04-01
Payer: COMMERCIAL

## 2021-04-01 ENCOUNTER — HOSPITAL ENCOUNTER (OUTPATIENT)
Facility: CLINIC | Age: 41
Discharge: HOME | End: 2021-04-01
Admitting: OBSTETRICS & GYNECOLOGY
Payer: COMMERCIAL

## 2021-04-01 DIAGNOSIS — N83.8 MASS OF LEFT OVARY: ICD-10-CM

## 2021-04-01 DIAGNOSIS — D50.0 IRON DEFICIENCY ANEMIA DUE TO CHRONIC BLOOD LOSS: ICD-10-CM

## 2021-04-01 DIAGNOSIS — Z71.89 OSTOMY NURSE CONSULTATION: Primary | ICD-10-CM

## 2021-04-01 DIAGNOSIS — K52.9 CHRONIC DIARRHEA: ICD-10-CM

## 2021-04-01 DIAGNOSIS — Z01.818 PRE-OP EVALUATION: ICD-10-CM

## 2021-04-01 LAB
ALBUMIN SERPL-MCNC: 3.5 G/DL (ref 3.4–5)
ALP SERPL-CCNC: 49 U/L (ref 40–150)
ALT SERPL W P-5'-P-CCNC: 20 U/L (ref 0–50)
ANION GAP SERPL CALCULATED.3IONS-SCNC: 9 MMOL/L (ref 3–14)
AST SERPL W P-5'-P-CCNC: 12 U/L (ref 0–45)
BILIRUB SERPL-MCNC: 0.3 MG/DL (ref 0.2–1.3)
BUN SERPL-MCNC: 14 MG/DL (ref 7–30)
CALCIUM SERPL-MCNC: 9 MG/DL (ref 8.5–10.1)
CEA SERPL-MCNC: <0.5 UG/L (ref 0–2.5)
CHLORIDE SERPL-SCNC: 104 MMOL/L (ref 94–109)
CO2 SERPL-SCNC: 27 MMOL/L (ref 20–32)
CREAT SERPL-MCNC: 0.69 MG/DL (ref 0.52–1.04)
GFR SERPL CREATININE-BSD FRML MDRD: >90 ML/MIN/{1.73_M2}
GLUCOSE SERPL-MCNC: 86 MG/DL (ref 70–99)
IRON SATN MFR SERPL: 6 % (ref 15–46)
IRON SERPL-MCNC: 21 UG/DL (ref 35–180)
POTASSIUM SERPL-SCNC: 4 MMOL/L (ref 3.4–5.3)
PROT SERPL-MCNC: 7 G/DL (ref 6.8–8.8)
SODIUM SERPL-SCNC: 140 MMOL/L (ref 133–144)
TIBC SERPL-MCNC: 329 UG/DL (ref 240–430)

## 2021-04-01 PROCEDURE — 86923 COMPATIBILITY TEST ELECTRIC: CPT

## 2021-04-01 PROCEDURE — 83550 IRON BINDING TEST: CPT | Performed by: PATHOLOGY

## 2021-04-01 PROCEDURE — 36415 COLL VENOUS BLD VENIPUNCTURE: CPT | Performed by: PATHOLOGY

## 2021-04-01 PROCEDURE — 99000 SPECIMEN HANDLING OFFICE-LAB: CPT | Performed by: PATHOLOGY

## 2021-04-01 PROCEDURE — 86301 IMMUNOASSAY TUMOR CA 19-9: CPT | Mod: 90 | Performed by: PATHOLOGY

## 2021-04-01 PROCEDURE — 86901 BLOOD TYPING SEROLOGIC RH(D): CPT | Mod: 90 | Performed by: PATHOLOGY

## 2021-04-01 PROCEDURE — 99207 PR PREOP VISIT IN GLOBAL PKG: CPT

## 2021-04-01 PROCEDURE — 82378 CARCINOEMBRYONIC ANTIGEN: CPT | Mod: 90 | Performed by: PATHOLOGY

## 2021-04-01 PROCEDURE — 86900 BLOOD TYPING SEROLOGIC ABO: CPT | Mod: 90 | Performed by: PATHOLOGY

## 2021-04-01 PROCEDURE — 86850 RBC ANTIBODY SCREEN: CPT | Mod: 90 | Performed by: PATHOLOGY

## 2021-04-01 PROCEDURE — 80053 COMPREHEN METABOLIC PANEL: CPT | Performed by: PATHOLOGY

## 2021-04-01 PROCEDURE — 83540 ASSAY OF IRON: CPT | Performed by: PATHOLOGY

## 2021-04-02 DIAGNOSIS — Z11.59 ENCOUNTER FOR SCREENING FOR OTHER VIRAL DISEASES: ICD-10-CM

## 2021-04-02 LAB
CANCER AG19-9 SERPL-ACNC: 9 U/ML (ref 0–37)
LABORATORY COMMENT REPORT: NORMAL
SARS-COV-2 RNA RESP QL NAA+PROBE: NEGATIVE
SARS-COV-2 RNA RESP QL NAA+PROBE: NORMAL
SPECIMEN SOURCE: NORMAL
SPECIMEN SOURCE: NORMAL

## 2021-04-02 PROCEDURE — U0003 INFECTIOUS AGENT DETECTION BY NUCLEIC ACID (DNA OR RNA); SEVERE ACUTE RESPIRATORY SYNDROME CORONAVIRUS 2 (SARS-COV-2) (CORONAVIRUS DISEASE [COVID-19]), AMPLIFIED PROBE TECHNIQUE, MAKING USE OF HIGH THROUGHPUT TECHNOLOGIES AS DESCRIBED BY CMS-2020-01-R: HCPCS | Performed by: OBSTETRICS & GYNECOLOGY

## 2021-04-02 PROCEDURE — U0005 INFEC AGEN DETEC AMPLI PROBE: HCPCS | Performed by: OBSTETRICS & GYNECOLOGY

## 2021-04-06 ENCOUNTER — ANCILLARY PROCEDURE (OUTPATIENT)
Dept: ULTRASOUND IMAGING | Facility: CLINIC | Age: 41
End: 2021-04-06
Attending: ANESTHESIOLOGY
Payer: COMMERCIAL

## 2021-04-06 ENCOUNTER — ANESTHESIA (OUTPATIENT)
Dept: SURGERY | Facility: CLINIC | Age: 41
DRG: 737 | End: 2021-04-06
Payer: COMMERCIAL

## 2021-04-06 ENCOUNTER — HOSPITAL ENCOUNTER (INPATIENT)
Facility: CLINIC | Age: 41
LOS: 5 days | Discharge: HOME | DRG: 737 | End: 2021-04-11
Attending: OBSTETRICS & GYNECOLOGY | Admitting: OBSTETRICS & GYNECOLOGY
Payer: COMMERCIAL

## 2021-04-06 DIAGNOSIS — N83.8 MASS OF LEFT OVARY: ICD-10-CM

## 2021-04-06 DIAGNOSIS — Z98.890 POSTOPERATIVE STATE: Primary | ICD-10-CM

## 2021-04-06 LAB
GLUCOSE BLDC GLUCOMTR-MCNC: 90 MG/DL (ref 70–99)
HCG UR QL: NEGATIVE

## 2021-04-06 PROCEDURE — 88342 IMHCHEM/IMCYTCHM 1ST ANTB: CPT | Mod: TC | Performed by: OBSTETRICS & GYNECOLOGY

## 2021-04-06 PROCEDURE — 250N000011 HC RX IP 250 OP 636: Performed by: STUDENT IN AN ORGANIZED HEALTH CARE EDUCATION/TRAINING PROGRAM

## 2021-04-06 PROCEDURE — 0WBF0ZZ EXCISION OF ABDOMINAL WALL, OPEN APPROACH: ICD-10-PCS | Performed by: SURGERY

## 2021-04-06 PROCEDURE — 88342 IMHCHEM/IMCYTCHM 1ST ANTB: CPT | Mod: 26 | Performed by: PATHOLOGY

## 2021-04-06 PROCEDURE — 250N000025 HC SEVOFLURANE, PER MIN: Performed by: OBSTETRICS & GYNECOLOGY

## 2021-04-06 PROCEDURE — 258N000003 HC RX IP 258 OP 636: Performed by: NURSE ANESTHETIST, CERTIFIED REGISTERED

## 2021-04-06 PROCEDURE — 250N000009 HC RX 250: Performed by: NURSE ANESTHETIST, CERTIFIED REGISTERED

## 2021-04-06 PROCEDURE — 0UT90ZZ RESECTION OF UTERUS, OPEN APPROACH: ICD-10-PCS | Performed by: OBSTETRICS & GYNECOLOGY

## 2021-04-06 PROCEDURE — 88305 TISSUE EXAM BY PATHOLOGIST: CPT | Mod: TC | Performed by: OBSTETRICS & GYNECOLOGY

## 2021-04-06 PROCEDURE — 0UT70ZZ RESECTION OF BILATERAL FALLOPIAN TUBES, OPEN APPROACH: ICD-10-PCS | Performed by: OBSTETRICS & GYNECOLOGY

## 2021-04-06 PROCEDURE — 0DBN0ZZ EXCISION OF SIGMOID COLON, OPEN APPROACH: ICD-10-PCS | Performed by: SURGERY

## 2021-04-06 PROCEDURE — 258N000003 HC RX IP 258 OP 636: Performed by: STUDENT IN AN ORGANIZED HEALTH CARE EDUCATION/TRAINING PROGRAM

## 2021-04-06 PROCEDURE — 250N000011 HC RX IP 250 OP 636: Performed by: NURSE ANESTHETIST, CERTIFIED REGISTERED

## 2021-04-06 PROCEDURE — 0UT20ZZ RESECTION OF BILATERAL OVARIES, OPEN APPROACH: ICD-10-PCS | Performed by: OBSTETRICS & GYNECOLOGY

## 2021-04-06 PROCEDURE — 0DBP0ZZ EXCISION OF RECTUM, OPEN APPROACH: ICD-10-PCS | Performed by: SURGERY

## 2021-04-06 PROCEDURE — 88331 PATH CONSLTJ SURG 1 BLK 1SPC: CPT | Mod: 26 | Performed by: PATHOLOGY

## 2021-04-06 PROCEDURE — 250N000006 HC OR RX SURGIFLO W/THROMBIN KIT 2ML 1991 OPNP: Performed by: OBSTETRICS & GYNECOLOGY

## 2021-04-06 PROCEDURE — 250N000011 HC RX IP 250 OP 636: Performed by: OBSTETRICS & GYNECOLOGY

## 2021-04-06 PROCEDURE — 0DBW0ZZ EXCISION OF PERITONEUM, OPEN APPROACH: ICD-10-PCS | Performed by: OBSTETRICS & GYNECOLOGY

## 2021-04-06 PROCEDURE — 0FB00ZZ EXCISION OF LIVER, OPEN APPROACH: ICD-10-PCS | Performed by: SURGERY

## 2021-04-06 PROCEDURE — 370N000017 HC ANESTHESIA TECHNICAL FEE, PER MIN: Performed by: OBSTETRICS & GYNECOLOGY

## 2021-04-06 PROCEDURE — C9290 INJ, BUPIVACAINE LIPOSOME: HCPCS | Performed by: STUDENT IN AN ORGANIZED HEALTH CARE EDUCATION/TRAINING PROGRAM

## 2021-04-06 PROCEDURE — 88313 SPECIAL STAINS GROUP 2: CPT | Mod: TC | Performed by: OBSTETRICS & GYNECOLOGY

## 2021-04-06 PROCEDURE — 88313 SPECIAL STAINS GROUP 2: CPT | Mod: 26 | Performed by: PATHOLOGY

## 2021-04-06 PROCEDURE — 88331 PATH CONSLTJ SURG 1 BLK 1SPC: CPT | Mod: TC | Performed by: OBSTETRICS & GYNECOLOGY

## 2021-04-06 PROCEDURE — 272N000001 HC OR GENERAL SUPPLY STERILE: Performed by: OBSTETRICS & GYNECOLOGY

## 2021-04-06 PROCEDURE — 81025 URINE PREGNANCY TEST: CPT | Performed by: STUDENT IN AN ORGANIZED HEALTH CARE EDUCATION/TRAINING PROGRAM

## 2021-04-06 PROCEDURE — 999N001017 HC STATISTIC GLUCOSE BY METER IP

## 2021-04-06 PROCEDURE — 0DBU0ZX EXCISION OF OMENTUM, OPEN APPROACH, DIAGNOSTIC: ICD-10-PCS | Performed by: OBSTETRICS & GYNECOLOGY

## 2021-04-06 PROCEDURE — 88309 TISSUE EXAM BY PATHOLOGIST: CPT | Mod: 26 | Performed by: PATHOLOGY

## 2021-04-06 PROCEDURE — 710N000010 HC RECOVERY PHASE 1, LEVEL 2, PER MIN: Performed by: OBSTETRICS & GYNECOLOGY

## 2021-04-06 PROCEDURE — 999N000141 HC STATISTIC PRE-PROCEDURE NURSING ASSESSMENT: Performed by: OBSTETRICS & GYNECOLOGY

## 2021-04-06 PROCEDURE — 120N000002 HC R&B MED SURG/OB UMMC

## 2021-04-06 PROCEDURE — 0UB04ZX EXCISION OF RIGHT OVARY, PERCUTANEOUS ENDOSCOPIC APPROACH, DIAGNOSTIC: ICD-10-PCS | Performed by: OBSTETRICS & GYNECOLOGY

## 2021-04-06 PROCEDURE — 88307 TISSUE EXAM BY PATHOLOGIST: CPT | Mod: 26 | Performed by: PATHOLOGY

## 2021-04-06 PROCEDURE — 250N000011 HC RX IP 250 OP 636: Performed by: ANESTHESIOLOGY

## 2021-04-06 PROCEDURE — 0UBF4ZX EXCISION OF CUL-DE-SAC, PERCUTANEOUS ENDOSCOPIC APPROACH, DIAGNOSTIC: ICD-10-PCS | Performed by: OBSTETRICS & GYNECOLOGY

## 2021-04-06 PROCEDURE — 88309 TISSUE EXAM BY PATHOLOGIST: CPT | Mod: TC | Performed by: OBSTETRICS & GYNECOLOGY

## 2021-04-06 PROCEDURE — 88307 TISSUE EXAM BY PATHOLOGIST: CPT | Mod: TC | Performed by: OBSTETRICS & GYNECOLOGY

## 2021-04-06 PROCEDURE — 250N000013 HC RX MED GY IP 250 OP 250 PS 637: Performed by: OBSTETRICS & GYNECOLOGY

## 2021-04-06 PROCEDURE — 0D9W4ZZ DRAINAGE OF PERITONEUM, PERCUTANEOUS ENDOSCOPIC APPROACH: ICD-10-PCS | Performed by: OBSTETRICS & GYNECOLOGY

## 2021-04-06 PROCEDURE — 360N000078 HC SURGERY LEVEL 5, PER MIN: Performed by: OBSTETRICS & GYNECOLOGY

## 2021-04-06 PROCEDURE — 88305 TISSUE EXAM BY PATHOLOGIST: CPT | Mod: 26 | Performed by: PATHOLOGY

## 2021-04-06 PROCEDURE — 0TN70ZZ RELEASE LEFT URETER, OPEN APPROACH: ICD-10-PCS | Performed by: SURGERY

## 2021-04-06 PROCEDURE — 0TN60ZZ RELEASE RIGHT URETER, OPEN APPROACH: ICD-10-PCS | Performed by: SURGERY

## 2021-04-06 PROCEDURE — 250N000009 HC RX 250: Performed by: OBSTETRICS & GYNECOLOGY

## 2021-04-06 PROCEDURE — 250N000013 HC RX MED GY IP 250 OP 250 PS 637: Performed by: STUDENT IN AN ORGANIZED HEALTH CARE EDUCATION/TRAINING PROGRAM

## 2021-04-06 PROCEDURE — 88332 PATH CONSLTJ SURG EA ADD BLK: CPT | Mod: TC | Performed by: OBSTETRICS & GYNECOLOGY

## 2021-04-06 RX ORDER — PHENAZOPYRIDINE HYDROCHLORIDE 200 MG/1
200 TABLET, FILM COATED ORAL ONCE
Status: COMPLETED | OUTPATIENT
Start: 2021-04-06 | End: 2021-04-06

## 2021-04-06 RX ORDER — CLINDAMYCIN PHOSPHATE 900 MG/50ML
900 INJECTION, SOLUTION INTRAVENOUS
Status: COMPLETED | OUTPATIENT
Start: 2021-04-06 | End: 2021-04-06

## 2021-04-06 RX ORDER — ONDANSETRON 4 MG/1
4 TABLET, ORALLY DISINTEGRATING ORAL EVERY 30 MIN PRN
Status: DISCONTINUED | OUTPATIENT
Start: 2021-04-06 | End: 2021-04-06 | Stop reason: HOSPADM

## 2021-04-06 RX ORDER — NALOXONE HYDROCHLORIDE 0.4 MG/ML
0.4 INJECTION, SOLUTION INTRAMUSCULAR; INTRAVENOUS; SUBCUTANEOUS
Status: DISCONTINUED | OUTPATIENT
Start: 2021-04-06 | End: 2021-04-08

## 2021-04-06 RX ORDER — SODIUM CHLORIDE, SODIUM LACTATE, POTASSIUM CHLORIDE, CALCIUM CHLORIDE 600; 310; 30; 20 MG/100ML; MG/100ML; MG/100ML; MG/100ML
INJECTION, SOLUTION INTRAVENOUS CONTINUOUS
Status: DISCONTINUED | OUTPATIENT
Start: 2021-04-06 | End: 2021-04-06 | Stop reason: HOSPADM

## 2021-04-06 RX ORDER — ONDANSETRON 4 MG/1
4 TABLET, ORALLY DISINTEGRATING ORAL EVERY 8 HOURS PRN
Status: DISCONTINUED | OUTPATIENT
Start: 2021-04-07 | End: 2021-04-10

## 2021-04-06 RX ORDER — LIDOCAINE 40 MG/G
CREAM TOPICAL
Status: DISCONTINUED | OUTPATIENT
Start: 2021-04-06 | End: 2021-04-11 | Stop reason: HOSPADM

## 2021-04-06 RX ORDER — NALOXONE HYDROCHLORIDE 0.4 MG/ML
0.2 INJECTION, SOLUTION INTRAMUSCULAR; INTRAVENOUS; SUBCUTANEOUS
Status: DISCONTINUED | OUTPATIENT
Start: 2021-04-06 | End: 2021-04-06

## 2021-04-06 RX ORDER — BUPIVACAINE HYDROCHLORIDE 2.5 MG/ML
INJECTION, SOLUTION EPIDURAL; INFILTRATION; INTRACAUDAL PRN
Status: DISCONTINUED | OUTPATIENT
Start: 2021-04-06 | End: 2021-04-06

## 2021-04-06 RX ORDER — ONDANSETRON 2 MG/ML
4 INJECTION INTRAMUSCULAR; INTRAVENOUS EVERY 30 MIN PRN
Status: DISCONTINUED | OUTPATIENT
Start: 2021-04-06 | End: 2021-04-06 | Stop reason: HOSPADM

## 2021-04-06 RX ORDER — SCOLOPAMINE TRANSDERMAL SYSTEM 1 MG/1
PATCH, EXTENDED RELEASE TRANSDERMAL PRN
Status: DISCONTINUED | OUTPATIENT
Start: 2021-04-06 | End: 2021-04-06

## 2021-04-06 RX ORDER — DIPHENHYDRAMINE HYDROCHLORIDE 50 MG/ML
25 INJECTION INTRAMUSCULAR; INTRAVENOUS EVERY 6 HOURS PRN
Status: DISCONTINUED | OUTPATIENT
Start: 2021-04-06 | End: 2021-04-11 | Stop reason: HOSPADM

## 2021-04-06 RX ORDER — CLINDAMYCIN PHOSPHATE 900 MG/50ML
900 INJECTION, SOLUTION INTRAVENOUS SEE ADMIN INSTRUCTIONS
Status: DISCONTINUED | OUTPATIENT
Start: 2021-04-06 | End: 2021-04-06 | Stop reason: HOSPADM

## 2021-04-06 RX ORDER — ONDANSETRON 4 MG/1
4 TABLET, ORALLY DISINTEGRATING ORAL EVERY 8 HOURS
Status: DISPENSED | OUTPATIENT
Start: 2021-04-06 | End: 2021-04-07

## 2021-04-06 RX ORDER — ESMOLOL HYDROCHLORIDE 10 MG/ML
INJECTION INTRAVENOUS PRN
Status: DISCONTINUED | OUTPATIENT
Start: 2021-04-06 | End: 2021-04-06

## 2021-04-06 RX ORDER — NALOXONE HYDROCHLORIDE 0.4 MG/ML
0.4 INJECTION, SOLUTION INTRAMUSCULAR; INTRAVENOUS; SUBCUTANEOUS
Status: DISCONTINUED | OUTPATIENT
Start: 2021-04-06 | End: 2021-04-06

## 2021-04-06 RX ORDER — LIDOCAINE 40 MG/G
CREAM TOPICAL
Status: DISCONTINUED | OUTPATIENT
Start: 2021-04-06 | End: 2021-04-06 | Stop reason: HOSPADM

## 2021-04-06 RX ORDER — HYDROMORPHONE HCL IN WATER/PF 6 MG/30 ML
0.2 PATIENT CONTROLLED ANALGESIA SYRINGE INTRAVENOUS
Status: DISCONTINUED | OUTPATIENT
Start: 2021-04-06 | End: 2021-04-07

## 2021-04-06 RX ORDER — NALOXONE HYDROCHLORIDE 0.4 MG/ML
0.2 INJECTION, SOLUTION INTRAMUSCULAR; INTRAVENOUS; SUBCUTANEOUS
Status: DISCONTINUED | OUTPATIENT
Start: 2021-04-06 | End: 2021-04-08

## 2021-04-06 RX ORDER — DIPHENHYDRAMINE HCL 25 MG
25 CAPSULE ORAL EVERY 6 HOURS PRN
Status: DISCONTINUED | OUTPATIENT
Start: 2021-04-06 | End: 2021-04-11 | Stop reason: HOSPADM

## 2021-04-06 RX ORDER — PROPOFOL 10 MG/ML
INJECTION, EMULSION INTRAVENOUS PRN
Status: DISCONTINUED | OUTPATIENT
Start: 2021-04-06 | End: 2021-04-06

## 2021-04-06 RX ORDER — OXYCODONE HYDROCHLORIDE 5 MG/1
5-10 TABLET ORAL
Status: DISCONTINUED | OUTPATIENT
Start: 2021-04-06 | End: 2021-04-09

## 2021-04-06 RX ORDER — HYDROMORPHONE HYDROCHLORIDE 1 MG/ML
0.5 INJECTION, SOLUTION INTRAMUSCULAR; INTRAVENOUS; SUBCUTANEOUS EVERY 10 MIN PRN
Status: DISCONTINUED | OUTPATIENT
Start: 2021-04-06 | End: 2021-04-06 | Stop reason: HOSPADM

## 2021-04-06 RX ORDER — AMOXICILLIN 250 MG
1 CAPSULE ORAL 2 TIMES DAILY PRN
Status: DISCONTINUED | OUTPATIENT
Start: 2021-04-06 | End: 2021-04-09

## 2021-04-06 RX ORDER — SODIUM CHLORIDE, SODIUM LACTATE, POTASSIUM CHLORIDE, CALCIUM CHLORIDE 600; 310; 30; 20 MG/100ML; MG/100ML; MG/100ML; MG/100ML
INJECTION, SOLUTION INTRAVENOUS CONTINUOUS
Status: DISCONTINUED | OUTPATIENT
Start: 2021-04-06 | End: 2021-04-07

## 2021-04-06 RX ORDER — ONDANSETRON 2 MG/ML
INJECTION INTRAMUSCULAR; INTRAVENOUS PRN
Status: DISCONTINUED | OUTPATIENT
Start: 2021-04-06 | End: 2021-04-06

## 2021-04-06 RX ORDER — GENTAMICIN SULFATE 100 MG/100ML
2 INJECTION, SOLUTION INTRAVENOUS
Status: DISCONTINUED | OUTPATIENT
Start: 2021-04-06 | End: 2021-04-06 | Stop reason: HOSPADM

## 2021-04-06 RX ORDER — HEPARIN SODIUM 5000 [USP'U]/.5ML
5000 INJECTION, SOLUTION INTRAVENOUS; SUBCUTANEOUS
Status: COMPLETED | OUTPATIENT
Start: 2021-04-06 | End: 2021-04-06

## 2021-04-06 RX ORDER — FENTANYL CITRATE 50 UG/ML
25-50 INJECTION, SOLUTION INTRAMUSCULAR; INTRAVENOUS
Status: DISCONTINUED | OUTPATIENT
Start: 2021-04-06 | End: 2021-04-06 | Stop reason: HOSPADM

## 2021-04-06 RX ORDER — FENTANYL CITRATE 50 UG/ML
INJECTION, SOLUTION INTRAMUSCULAR; INTRAVENOUS PRN
Status: DISCONTINUED | OUTPATIENT
Start: 2021-04-06 | End: 2021-04-06

## 2021-04-06 RX ORDER — SODIUM CHLORIDE, SODIUM LACTATE, POTASSIUM CHLORIDE, CALCIUM CHLORIDE 600; 310; 30; 20 MG/100ML; MG/100ML; MG/100ML; MG/100ML
INJECTION, SOLUTION INTRAVENOUS CONTINUOUS PRN
Status: DISCONTINUED | OUTPATIENT
Start: 2021-04-06 | End: 2021-04-06

## 2021-04-06 RX ORDER — LIDOCAINE HYDROCHLORIDE 20 MG/ML
INJECTION, SOLUTION INFILTRATION; PERINEURAL PRN
Status: DISCONTINUED | OUTPATIENT
Start: 2021-04-06 | End: 2021-04-06

## 2021-04-06 RX ORDER — PROPOFOL 10 MG/ML
INJECTION, EMULSION INTRAVENOUS CONTINUOUS PRN
Status: DISCONTINUED | OUTPATIENT
Start: 2021-04-06 | End: 2021-04-06

## 2021-04-06 RX ORDER — KETOROLAC TROMETHAMINE 30 MG/ML
30 INJECTION, SOLUTION INTRAMUSCULAR; INTRAVENOUS EVERY 6 HOURS
Status: COMPLETED | OUTPATIENT
Start: 2021-04-06 | End: 2021-04-07

## 2021-04-06 RX ORDER — SIMETHICONE 80 MG
80 TABLET,CHEWABLE ORAL 4 TIMES DAILY PRN
Status: DISCONTINUED | OUTPATIENT
Start: 2021-04-06 | End: 2021-04-11 | Stop reason: HOSPADM

## 2021-04-06 RX ORDER — POLYETHYLENE GLYCOL 3350 17 G/17G
17 POWDER, FOR SOLUTION ORAL DAILY
Status: DISCONTINUED | OUTPATIENT
Start: 2021-04-06 | End: 2021-04-11 | Stop reason: HOSPADM

## 2021-04-06 RX ORDER — AMOXICILLIN 250 MG
2 CAPSULE ORAL 2 TIMES DAILY PRN
Status: DISCONTINUED | OUTPATIENT
Start: 2021-04-06 | End: 2021-04-09

## 2021-04-06 RX ORDER — DEXAMETHASONE SODIUM PHOSPHATE 4 MG/ML
INJECTION, SOLUTION INTRA-ARTICULAR; INTRALESIONAL; INTRAMUSCULAR; INTRAVENOUS; SOFT TISSUE PRN
Status: DISCONTINUED | OUTPATIENT
Start: 2021-04-06 | End: 2021-04-06

## 2021-04-06 RX ADMIN — FENTANYL CITRATE 50 MCG: 50 INJECTION, SOLUTION INTRAMUSCULAR; INTRAVENOUS at 16:29

## 2021-04-06 RX ADMIN — FENTANYL CITRATE 50 MCG: 50 INJECTION, SOLUTION INTRAMUSCULAR; INTRAVENOUS at 10:04

## 2021-04-06 RX ADMIN — HYDROMORPHONE HYDROCHLORIDE 0.5 MG: 1 INJECTION, SOLUTION INTRAMUSCULAR; INTRAVENOUS; SUBCUTANEOUS at 18:51

## 2021-04-06 RX ADMIN — FENTANYL CITRATE 25 MCG: 50 INJECTION, SOLUTION INTRAMUSCULAR; INTRAVENOUS at 17:14

## 2021-04-06 RX ADMIN — SODIUM CHLORIDE, POTASSIUM CHLORIDE, SODIUM LACTATE AND CALCIUM CHLORIDE: 600; 310; 30; 20 INJECTION, SOLUTION INTRAVENOUS at 09:02

## 2021-04-06 RX ADMIN — PROPOFOL 10 MG: 10 INJECTION, EMULSION INTRAVENOUS at 16:58

## 2021-04-06 RX ADMIN — HYDROMORPHONE HYDROCHLORIDE 0.5 MG: 1 INJECTION, SOLUTION INTRAMUSCULAR; INTRAVENOUS; SUBCUTANEOUS at 19:16

## 2021-04-06 RX ADMIN — ROCURONIUM BROMIDE 20 MG: 10 INJECTION INTRAVENOUS at 10:59

## 2021-04-06 RX ADMIN — ROCURONIUM BROMIDE 20 MG: 10 INJECTION INTRAVENOUS at 09:25

## 2021-04-06 RX ADMIN — ROCURONIUM BROMIDE 20 MG: 10 INJECTION INTRAVENOUS at 12:27

## 2021-04-06 RX ADMIN — POLYETHYLENE GLYCOL 3350 17 G: 17 POWDER, FOR SOLUTION ORAL at 21:30

## 2021-04-06 RX ADMIN — ESMOLOL HYDROCHLORIDE 20 MG: 10 INJECTION, SOLUTION INTRAVENOUS at 13:56

## 2021-04-06 RX ADMIN — ESMOLOL HYDROCHLORIDE 10 MG: 10 INJECTION, SOLUTION INTRAVENOUS at 15:54

## 2021-04-06 RX ADMIN — BUPIVACAINE HYDROCHLORIDE 20 ML: 2.5 INJECTION, SOLUTION EPIDURAL; INFILTRATION; INTRACAUDAL; PERINEURAL at 17:15

## 2021-04-06 RX ADMIN — FENTANYL CITRATE 25 MCG: 50 INJECTION, SOLUTION INTRAMUSCULAR; INTRAVENOUS at 17:05

## 2021-04-06 RX ADMIN — SODIUM CHLORIDE, POTASSIUM CHLORIDE, SODIUM LACTATE AND CALCIUM CHLORIDE: 600; 310; 30; 20 INJECTION, SOLUTION INTRAVENOUS at 20:42

## 2021-04-06 RX ADMIN — FENTANYL CITRATE 50 MCG: 50 INJECTION, SOLUTION INTRAMUSCULAR; INTRAVENOUS at 13:45

## 2021-04-06 RX ADMIN — FENTANYL CITRATE 50 MCG: 50 INJECTION, SOLUTION INTRAMUSCULAR; INTRAVENOUS at 15:50

## 2021-04-06 RX ADMIN — MAGNESIUM HYDROXIDE 15 ML: 400 SUSPENSION ORAL at 21:30

## 2021-04-06 RX ADMIN — FENTANYL CITRATE 50 MCG: 50 INJECTION, SOLUTION INTRAMUSCULAR; INTRAVENOUS at 11:58

## 2021-04-06 RX ADMIN — HYDROMORPHONE HYDROCHLORIDE 0.2 MG: 0.2 INJECTION, SOLUTION INTRAMUSCULAR; INTRAVENOUS; SUBCUTANEOUS at 21:27

## 2021-04-06 RX ADMIN — FENTANYL CITRATE 50 MCG: 50 INJECTION, SOLUTION INTRAMUSCULAR; INTRAVENOUS at 09:18

## 2021-04-06 RX ADMIN — FENTANYL CITRATE 50 MCG: 50 INJECTION, SOLUTION INTRAMUSCULAR; INTRAVENOUS at 18:08

## 2021-04-06 RX ADMIN — PROPOFOL 150 MG: 10 INJECTION, EMULSION INTRAVENOUS at 09:18

## 2021-04-06 RX ADMIN — KETOROLAC TROMETHAMINE 30 MG: 30 INJECTION, SOLUTION INTRAMUSCULAR; INTRAVENOUS at 19:02

## 2021-04-06 RX ADMIN — SCOPALAMINE 1 PATCH: 1 PATCH, EXTENDED RELEASE TRANSDERMAL at 08:55

## 2021-04-06 RX ADMIN — HYDROMORPHONE HYDROCHLORIDE 0.5 MG: 1 INJECTION, SOLUTION INTRAMUSCULAR; INTRAVENOUS; SUBCUTANEOUS at 11:10

## 2021-04-06 RX ADMIN — BUPIVACAINE 20 ML: 13.3 INJECTION, SUSPENSION, LIPOSOMAL INFILTRATION at 17:15

## 2021-04-06 RX ADMIN — PROPOFOL 10 MG: 10 INJECTION, EMULSION INTRAVENOUS at 17:08

## 2021-04-06 RX ADMIN — ROCURONIUM BROMIDE 20 MG: 10 INJECTION INTRAVENOUS at 10:21

## 2021-04-06 RX ADMIN — ESMOLOL HYDROCHLORIDE 50 MG: 10 INJECTION, SOLUTION INTRAVENOUS at 09:20

## 2021-04-06 RX ADMIN — ROCURONIUM BROMIDE 20 MG: 10 INJECTION INTRAVENOUS at 14:24

## 2021-04-06 RX ADMIN — ROCURONIUM BROMIDE 20 MG: 10 INJECTION INTRAVENOUS at 13:46

## 2021-04-06 RX ADMIN — CLINDAMYCIN PHOSPHATE 900 MG: 900 INJECTION, SOLUTION INTRAVENOUS at 15:43

## 2021-04-06 RX ADMIN — PROPOFOL 30 MCG/KG/MIN: 10 INJECTION, EMULSION INTRAVENOUS at 09:57

## 2021-04-06 RX ADMIN — ROCURONIUM BROMIDE 20 MG: 10 INJECTION INTRAVENOUS at 11:23

## 2021-04-06 RX ADMIN — HYDROMORPHONE HYDROCHLORIDE 0.5 MG: 1 INJECTION, SOLUTION INTRAMUSCULAR; INTRAVENOUS; SUBCUTANEOUS at 13:01

## 2021-04-06 RX ADMIN — ROCURONIUM BROMIDE 20 MG: 10 INJECTION INTRAVENOUS at 11:58

## 2021-04-06 RX ADMIN — FENTANYL CITRATE 50 MCG: 50 INJECTION, SOLUTION INTRAMUSCULAR; INTRAVENOUS at 11:02

## 2021-04-06 RX ADMIN — ROCURONIUM BROMIDE 20 MG: 10 INJECTION INTRAVENOUS at 13:23

## 2021-04-06 RX ADMIN — SUGAMMADEX 200 MG: 100 INJECTION, SOLUTION INTRAVENOUS at 17:20

## 2021-04-06 RX ADMIN — ROCURONIUM BROMIDE 20 MG: 10 INJECTION INTRAVENOUS at 15:05

## 2021-04-06 RX ADMIN — LIDOCAINE HYDROCHLORIDE 60 MG: 20 INJECTION, SOLUTION INFILTRATION; PERINEURAL at 09:18

## 2021-04-06 RX ADMIN — SODIUM CHLORIDE, POTASSIUM CHLORIDE, SODIUM LACTATE AND CALCIUM CHLORIDE: 600; 310; 30; 20 INJECTION, SOLUTION INTRAVENOUS at 15:30

## 2021-04-06 RX ADMIN — PHENAZOPYRIDINE HYDROCHLORIDE 200 MG: 200 TABLET, FILM COATED ORAL at 08:30

## 2021-04-06 RX ADMIN — DEXAMETHASONE SODIUM PHOSPHATE 8 MG: 4 INJECTION, SOLUTION INTRA-ARTICULAR; INTRALESIONAL; INTRAMUSCULAR; INTRAVENOUS; SOFT TISSUE at 09:45

## 2021-04-06 RX ADMIN — HEPARIN SODIUM 5000 UNITS: 5000 INJECTION, SOLUTION INTRAVENOUS; SUBCUTANEOUS at 08:51

## 2021-04-06 RX ADMIN — PROPOFOL 20 MG: 10 INJECTION, EMULSION INTRAVENOUS at 16:48

## 2021-04-06 RX ADMIN — CLINDAMYCIN PHOSPHATE 900 MG: 900 INJECTION, SOLUTION INTRAVENOUS at 09:43

## 2021-04-06 RX ADMIN — ESMOLOL HYDROCHLORIDE 20 MG: 10 INJECTION, SOLUTION INTRAVENOUS at 16:16

## 2021-04-06 RX ADMIN — HYDROMORPHONE HYDROCHLORIDE 0.5 MG: 1 INJECTION, SOLUTION INTRAMUSCULAR; INTRAVENOUS; SUBCUTANEOUS at 15:03

## 2021-04-06 RX ADMIN — FENTANYL CITRATE 50 MCG: 50 INJECTION, SOLUTION INTRAMUSCULAR; INTRAVENOUS at 18:25

## 2021-04-06 RX ADMIN — ROCURONIUM BROMIDE 50 MG: 10 INJECTION INTRAVENOUS at 09:19

## 2021-04-06 RX ADMIN — ONDANSETRON 4 MG: 2 INJECTION INTRAMUSCULAR; INTRAVENOUS at 18:09

## 2021-04-06 RX ADMIN — OXYCODONE HYDROCHLORIDE 5 MG: 5 TABLET ORAL at 20:20

## 2021-04-06 RX ADMIN — ROCURONIUM BROMIDE 10 MG: 10 INJECTION INTRAVENOUS at 15:49

## 2021-04-06 RX ADMIN — SODIUM CHLORIDE, POTASSIUM CHLORIDE, SODIUM LACTATE AND CALCIUM CHLORIDE: 600; 310; 30; 20 INJECTION, SOLUTION INTRAVENOUS at 09:57

## 2021-04-06 RX ADMIN — MIDAZOLAM 2 MG: 1 INJECTION INTRAMUSCULAR; INTRAVENOUS at 09:02

## 2021-04-06 RX ADMIN — ONDANSETRON 4 MG: 2 INJECTION INTRAMUSCULAR; INTRAVENOUS at 16:05

## 2021-04-06 ASSESSMENT — COPD QUESTIONNAIRES: COPD: 0

## 2021-04-06 ASSESSMENT — ACTIVITIES OF DAILY LIVING (ADL): ADLS_ACUITY_SCORE: 15

## 2021-04-06 ASSESSMENT — MIFFLIN-ST. JEOR: SCORE: 1110.25

## 2021-04-06 NOTE — ANESTHESIA PROCEDURE NOTES
TAP Procedure Note  Pre-Procedure   Staff -        Anesthesiologist:  Mireya Nye MD       Resident/Fellow: Roberto Junior III, MD       Performed By: resident       Location: OR       Pre-Anesthestic Checklist: patient identified, IV checked, site marked, risks and benefits discussed, informed consent, monitors and equipment checked, pre-op evaluation, at physician/surgeon's request and post-op pain management  Timeout:       Correct Patient: Yes        Correct Procedure: Yes        Correct Site: Yes        Correct Position: Yes        Correct Laterality: Yes        Site Marked: Yes  Procedure Documentation  Procedure: TAP       Diagnosis: PERIOP PAIN       Laterality: bilateral       Patient Position: supine       Skin prep: Chloraprep       Needle Type: insulated       Needle Gauge: 21.        Needle Length (millimeters): 100        - Ultrasound guided       - Ultrasound used to identify targeted nerve, plexus, vascular marker, or fascial plane and place a needle adjacent to it in real-time       - Ultrasound was used to visualize the spread of anesthetic in close proximity to the above referenced structure       - A permanent image is entered into the patient's record.    Assessment/Narrative         The placement was negative for: blood aspirated, painful injection and site bleeding       Paresthesias: No.      Bolus given via needle. No blood aspirated via catheter.        Secured via.        Insertion/Infusion Method: Single Shot       Complications: none    Comments:  Routine TAP block, bilateral classic

## 2021-04-06 NOTE — BRIEF OP NOTE
[facility]    Brief Operative Note    Pre-operative diagnosis: Mass of left ovary [N83.8]  Post-operative diagnosis Same as pre-operative diagnosis, Low grade serous ovarian cancer     Procedure: Procedure(s):  Diagnostic laparoscopy with biopsies converted to exploratory laparotomy, modified radical hysterectomy bilateral salpingo-oophorectomy, bilateral ureterolysis, bladder peritonectomy, omentectomy, partial hepatectomy, diapraghm stripping, liver mobilization, colon mobilization, resection of abdominal wall nodule, tumor debulking R0, no macroscopic residual disease  Partial left hepatectomy level 3  Surgeon: Surgeon(s) and Role:  Panel 1:     * Luis Ch MD - Primary     * Chris Zuñiga MD - Assisting     * Mireya Ceballos MD - Fellow - Assisting     * Sarahi Harris MD  Panel 2:     * Chris Zuñiga MD - Primary  Anesthesia: General w/ TAP block at end of procedure (Exparel)    Estimated blood loss: 300 ml  IVF: 4500 ml crystalloid  Urine: 750 ml   Drains: Robin catheter   Specimens:   ID Type Source Tests Collected by Time Destination   1 : Blood  Blood, venous Blood OR DOCUMENTATION ONLY Luis Ch MD 4/6/2021 10:09 AM    2 : Ascities  Other (specify in comments) Other OR DOCUMENTATION ONLY Luis Ch MD 4/6/2021 10:24 AM    A : Right ovarian biopsy  Tissue Ovary, Right SURGICAL PATHOLOGY EXAM Luis Ch MD 4/6/2021 10:26 AM    B : cul da sac nodule  Tissue Other SURGICAL PATHOLOGY EXAM Luis Ch MD 4/6/2021 11:25 AM    C : omental biopsy  Tissue Omentum SURGICAL PATHOLOGY EXAM Luis Ch MD 4/6/2021 11:28 AM    D : Left fallopian tube and ovary  Tissue Fallopian Tube and Ovary, Left SURGICAL PATHOLOGY EXAM Luis Ch MD 4/6/2021 11:47 AM    E : Uterus, Cervix, Right Fallopian Tube and Right Ovary Tissue Uterus, Cervix, Right Fallopian Tube SURGICAL PATHOLOGY EXAM  Luis Ch MD 4/6/2021 12:30 PM    F : cul da sac nodule #2  Tissue Other SURGICAL PATHOLOGY EXAM Luis Ch MD 4/6/2021 12:59 PM    G : Left pelvic sidewall nodule  Tissue Pelvic Sidewall SURGICAL PATHOLOGY EXAM Luis Ch MD 4/6/2021  1:19 PM    H : Falciform ligament  Tissue Other SURGICAL PATHOLOGY EXAM Luis Ch MD 4/6/2021  1:33 PM    I : Omentum  Tissue Omentum SURGICAL PATHOLOGY EXAM Luis Ch MD 4/6/2021  2:05 PM    J : Right lower quardant abdominal wall nodule  Tissue Abdomen SURGICAL PATHOLOGY EXAM Luis Ch MD 4/6/2021  2:40 PM    K : Right hemidiapraghm peritoneum Tissue Abdomen SURGICAL PATHOLOGY EXAM Luis Ch MD 4/6/2021  2:56 PM    L : Liver surface nodule Tissue Liver SURGICAL PATHOLOGY EXAM Luis Ch MD 4/6/2021  3:01 PM    M : Small bowel serousal nodule Tissue Other SURGICAL PATHOLOGY EXAM Luis Ch MD 4/6/2021  3:07 PM    N : small bowel mesentery nodule Tissue Other SURGICAL PATHOLOGY EXAM Luis Ch MD 4/6/2021  3:13 PM    O : small bowel mesentery nodule #2 Tissue Other SURGICAL PATHOLOGY EXAM Luis Ch MD 4/6/2021  3:16 PM    P : Partial Left Hepatectomy level 3 Tissue Liver SURGICAL PATHOLOGY EXAM Luis Ch MD 4/6/2021  3:53 PM      Findings:  3 cm nodule in the anterior abdominal wall above the fascia in RLQ. On exam under anesthesia, normal appearing external genitalia, cervix without lesions. Vagina without nodularity. Rectum without nodularity/masses. Uterus enlarged with 4 cm posterior fibroid, enlarged nodular left ovarian mass, another mass continguous with left ovary to pelvic sidewall. Moderate adhesive of the bladder to lower uterine segment, sigmoid colon to left ovarian mass. Omentum with minimal disease, large and small bowel unremarkable. Mesentery free of disease. Posterior pelvic peritoneum with nodularity just lateral to rectum bilaterally.  Bladder peritoneum with miliary disease. Right hemidiaphragm, right anterior liver surface with miliary disease. 4 cm nodular mass in segment 3 of liver. No clinically enlarged lymph nodes. Frozen section: low grade serous ovarian carcinoma.  Before and following procedure, bilateral ureters were easily visualized to vermiculate. At end of procedure, vaginal cuff intact and hemostatic, pelvis and abdomen hemostatic.Tumor debulking status: No gross residual disease.     Complications: None.  Implants: * No implants in log *

## 2021-04-06 NOTE — OP NOTE
GYNECOLOGIC ONCOLOGY OPERATION NOTE    PATIENT: Kayy Salazar  MRN: [id number]  DATE OF SURGERY: 4/6/2021    PREOPERATIVE DIAGNOSES:   - Left adnexal mass  - Carcinomatosis  - Ascites  - Right abdominal wall mass  - Metastatic adenocarcinoma on paracentesis    POSTOPERATIVE DIAGNOSES:   - Same as above, s/p procedures below  - Low grade serous ovarian cancer on frozen pathology    OPERATIVE PROCEDURES:   Diagnostic laparoscopy with biopsies, converted to exploratory laparotomy, modified radical hysterectomy, bilateral salpingo-oophorectomy, bilateral ureterolysis, bladder peritonectomy, omentectomy, diaphragm stripping, liver mobilization, colon mobilization, small bowel serosa oversewing, resection of abdominal wall nodule  Partial hepatectomy performed by Surgical Oncology (Dr. Zuñiga)     SURGEON:   MD Chris Gomez MD    ASSISTANTS:   Mireya Ceballos MD, Gyn Onc fellow  Rufino Harris MD, PGY-2    ANESTHESIA: General endotracheal anesthesia, TAP block with Exparel   ESTIMATED BLOOD LOSS: 300 mL  TOTAL INTRAVENOUS FLUIDS: 4500 mL crystalloid   TOTAL URINE OUTPUT: 750 mL, clear urine    TRANSFUSIONS: None  DRAIN: Robin cathether     SPECIMENS REMOVED:   Right ovarian biopsy  Cul de sac nodule biopsy  Omental biopsy  Left fallopian tube and ovary  Uterus with cervix, right fallopian tube, right ovary  Cul de sac nodule #2  Left pelvic sidewall nodule  Falciform ligament  Omentum  Right lower quadrant abdominal wall nodule  Right hemidiaphragm peritoneum  Liver surface nodule  Small bowel serosal nodule  Small bowel mesentery nodule  Small bowel mesentery nodule #2  Partial left hepatectomy, segment 3    OPERATIVE FINDINGS:   EUA: Normal external female genitalia. Vaginal and cervix with no palpable lesions. No palpable adnexal mass. RLQ abdominal wall with 3x1 cm nodule.  Laparoscopy: Atraumatic entry. Approximately 2L ascites. Uterus enlarged with 4 cm posterior fibroid. Enlarged left  ovary with significant nodularity, contiguous nodular left ovarian mass. Right ovary with nodularity. Posterior cul de sac with plaque-like nodules. Miliary disease of bladder peritoneum. Moderate adhesions of bladder to lower uterine segment. Moderate adhesions of sigmoid colon to left pelvic sidewall and ovarian mass. Omentum unremarkable. No obvious lesions of the visible large and small bowel. Right hemidiaphragm with miliary disease. Right anterior liver surface with miliary disease. Biopsies taken of right ovary, cul de sac nodule sent for frozen pathology, likely low grade serous ovarian carcinoma. Decision made to proceed with laparotomy, debulking.  Laparotomy: Above findings confirmed. 3 cm abdominal wall mass suprafascial. Omentum with reactive inflammation but no obvious lesions. No obvious disease impacting the large bowel. Small bowel run 2 small nodules of the mesentery and single serosal nodule. Approximately 4 cm nodular mass in segment 3 of liver inferiorly. No clinically enlarged lymph nodes. Vermiculation of bilateral ureters. Hemostasis of all surgical pedicles and vaginal cuff noted at completion of procedure.   Frozen section: Low grade serous ovarian carcinoma.   Tumor debulked to R0, no macroscopic residual disease.    COMPLICATIONS: None noted  CONDITION: Extubated, stable on transfer    INDICATIONS FOR PROCEDURE:   Ms. Salazar is 40 year old  with newly diagnosed pelvic mass, carcinomatosis and ascites on CT.  was 191. [facility]18[facility]21 where a likely diagnosis of advanced ovarian cancer was discussed. Treatment options were reviewed. Recommendation was made for diagnostic laparoscopy with biopsies and possible primary tumor debulking. Risks, benefits, alternatives were discussed. Patient agreed to proceed and signed consent was obtained for the above procedure. Of note, she presented to the ED on 3/20 for abdominal  pain, nausea. Paracentesis returned as metastatic adenocarcinoma.    OPERATIVE PROCEDURE IN DETAIL:   The consent was reviewed with the patient in the preoperative setting and confirmed. She received antibiotic prophylaxis, Heparin for DVT prophylaxis, and had sequential compression devices applied to her calves. She was transferred to the operating room and placed on the operating room table in dorsal lithotomy position with left arm tucked. General anesthesia was administered in the usual manner. An orogastric tube was placed for gastric decompression. Exam under anesthesia was performed with findings noted above. The patient was then prepped and draped in the standard fashion. A Robin catheter was placed in the bladder. A timeout was called at which point the patient's name, operative procedure and site was confirmed by the operative team.     Approximately 2 cm above the umbilicus, a 12 mm incision was made with the scalpel. This was carried down to the underlying fascia with cautery and blunt dissection. The fascia was grasped with Kocher clamps and elevated. The facial was opened with a scalpel and tagged with 0-Vicryl. The peritoneum was grasped with Azra clamps and intraabdominal entry noted. The opening was extended with blunt dissection. A 12 mm port was placed. CO2 gas was used to insufflate the abdomen and intraabdominal placement confirmed with low opening pressure and visually with the laparoscope. Two 5 mm ports were placed in the RLQ and LLQ under direct visualization. The patient was placed in steep Trendelenburg position.     The ascites was evacuated with suction. The abdomen and pelvis surveyed with findings noted above. Friable nodular tissue samples were taken from the right ovary and right posterior cul de sac. These were sent for frozen pathology, which returned likely low grade serous ovarian carcinoma, though could not rule out borderline tumor. Given the findings, decision was made to  proceed with laparotomy and primary debulking. The abdomen was desufflated and all ports removed.     The Bovie was used to incise the skin from the existing supraumbilical incision down to the level of the pubic symphysis. This was carried through the subcutaneous and fascial layers with cautery. Exploration of the pelvis and upper abdomen was performed with the findings described above. A small omental biopsy was taken and sent for frozen section. The Bookwalter retractor was positioned and small bowel was packed up into the upper abdomen.     Attention was turned to the left adnexa. The sigmoid colon had mild adhesions to the ovary and left pelvic sidewall. These adhesions were taken down with cautery. The left round ligament was identified, grasped with a Kocher, and transected with cautery. The round ligament was subsequently suture ligated. The incision through the round ligament was continued cephalad with cautery and the retroperitoneum entered. The left ureter was identified deep in the pelvis, away from the field of dissection. A window was created below the infundibulopelvic ligament, isolating the ovarian vessels. The IP ligament was doubly clamped, cut, and suture ligated. The left uteroovarian ligament was then doubly clamped, cut, and suture ligated. The specimen was removed and sent for frozen pathology. The anterior leaf of the broad ligament was dissected caudally towards the bladder. Due to concern for miliary disease, the anterior cul de sac/bladder peritoneum was stripped.     Similarly on the right, the round ligament was identified, grasped with a Kocher, and transected with cautery. The round ligament was then suture ligated. The incision was continued cephalad, opening the retroperitoneum. The right ureter was easily identified. A defect was created below the infundibulopelvic ligament, isolating the ovarian vessels. The IP was doubly clamped, cut, and suture ligated. The anterior leaf of  the broad ligament was dissected caudally to meet the peritoneal dissection on the left. The entire bladder peritoneum  Was stripped of the bladder and included in the hysterectomy specimen. A bladder flap was then able to be developed to the level of the upper vagina using a combination of cautery and blunt dissection. The uterine arteries were skeletonized bilaterally with cautery. Bilateral utereterolysis was performed.    Bilaterally, the uterine arteries were then clamped using curved Lisa clamps, transected and suture ligated using 2-0 Vicryl. The cardinal ligaments on both sides were serially clamped using straight Lisa clamps, transected and suture ligated using 2-0 Vicryl to the level of the external cervical os. The cervicovaginal junction was then palpated easily. The corners of the vagina were grasped with double tooth tenacula, and Esha scissors were used to enter the vagina and amputate the cervix. This specimen was removed and sent for permanent pathology. The anterior and posterior surfaces of the vaginal cuff were then also grasped with double tooth tenacula.The vagina was irrigated with betadine. The vaginal cuff was closed with 0-Vicryl in a running locked fashion in one direction and oversewn along the cuff with a second running layer. Excellent hemostasis was noted.       There was nodularity in the pelvis along the rectum and sigmoid colon peritoneum laterally on the right and left side which was stripped using Bovie cautery and blunt dissection. Bilateral ureterolysis was performed in order to ensure the ureter was safely lateral of the field of the dissection. The rectum and sigmoid colon was easily retracted away medially from the site of dissection. Hemostasis was achieved and then Floseal was placed along the dissection areas and along the vaginal cuff.     Attention was then turned mobilization of the ascending and descending colon using Bovie cautery. The omentectomy was then  performed from the hepatic to splenic flexure using Bovie cautery and the Ligasure device. Hemostasis was visualized. The mesentery of the transverse colon was unharmed during dissection. The spleen and hilum were carefully examined with no evidence of disease. The left hemidiaphragm was spared. The right hemidiaphragm was notable for miliary disease. The Charles Brown retractor was then positioned for adequate visualization of the RUQ. The right liver was mobilized using Bovie cautery. A 4 mm surface nedule of the liver was removed with cautery. The right hemidiaphragm was then incised with Bovie cautery and the peritoneum was carefully dissected away with Peanut dissector and Bovie cautery. Hemostasis was visualized. There was a 4 cm mass in inferior surface of segment 3 of liver. Dr. Zuñiga performed partial hepatectomy; please seen separate operative note. Surgicel snow was placed over the dissection bed. Hemostasis was visualized.     There was a right lateral abdominal wall nodule in RLQ that was dissected from the inside of the abdomen. The fascia was incised and the nodule was grasped with Allis clamps. Bovie cautery was used to release the nodule from the subcutaneous tissue and it was delivered through the fascia. The defect was then closed with 0-PDS. Hemostasis was noted. The small and large bowel was then run; there was no evidence of injury or defects. There was a thin plaque along the small bowel was removed with Metzenbaum scissors. The small bowel was oversewed with 3-0 Silk. There was another nodule along the mesentery that was removed with Metzenbaum scissors.  Irrigation was performed and hemostasis was visualized.     Fascia was closed with 0 looped PDS in 2 segments meeting in the middle. Subcutaneous tissue was irrigated and hemostasis assured. A subdermal layer was performed using 3-0 Vicryl in 2 segments, meeting in the middle. Skin was closed with 3-0 Monocryl in a subcuticular fashion. Port  incisions were closed with 3-0 Monocryl. All incisions were covered with steri strips and sterile dressing.      The patient tolerated the procedure well. Sponge, lap, and needle counts were correct x2. She was extubated and taken to PACU in stable condition.     Luis Ch MD, MS    Department of Obstetrics and Gynecology   Division of Gynecologic Oncology   [facility]  Phone: [phone]

## 2021-04-06 NOTE — ANESTHESIA PROCEDURE NOTES
Airway       Patient location during procedure: OR       Procedure Start/Stop Times: 4/6/2021 9:46 AM  Staff -        Other Anesthesia Staff: Lenore Chavez       Performed By: SRNAIndications and Patient Condition       Indications for airway management: ragini-procedural       Induction type:intravenous       Mask difficulty assessment: 1 - vent by mask    Final Airway Details       Final airway type: endotracheal airway       Successful airway: ETT - single and Oral  Endotracheal Airway Details        ETT size (mm): 6.5       Cuffed: yes       Successful intubation technique: direct laryngoscopy       DL Blade Type: Kern 2       Grade View of Cords: 1       Adjucts: stylet       Position: Right       Measured from: gums/teeth       Secured at (cm): 22    Post intubation assessment        Placement verified by: capnometry, equal breath sounds and chest rise        Number of attempts at approach: 1       Number of other approaches attempted: 0       Secured with: pink tape       Ease of procedure: easy       Dentition: Intact and Unchanged    Medication(s) Administered   Medication Administration Time: 4/6/2021 9:21 AM

## 2021-04-06 NOTE — ANESTHESIA CARE TRANSFER NOTE
"Advised to follow up with Cardiology concerning previous coronary artery disease and stenting.      Last card note: Pt with sxs concerning for worsening of CAD. Last SPECT in 2011 was inconclusive with inability to achieve target and "fixed" inferior wall defect thought to be infarct on study - however ECHO with normal WMA.  Will obtain PET with absolute flow to get a definitive answer on ischemic burden.  Carotids to evaluate facial complaints - no bruits heard but he has PVD and CAD  Refilled viagra- Pt aware of nitro- viagra interaction and safe use of either med.  He hasn't really used nitro in several years.   " Patient: Kayy Salazar    Procedure(s):  Diagnostic laparoscopy with biopsies converted to exploratory laparotmy, modified radical hysterectomy bilateral salpingo-oophorectomy, bilateral ureterolysis, peritonectomy, omentectomy, resection of liver nodule, diapraghm stripping, liver mobilization, colon mobilization, resection of abdominal wall nodule, tumor debulking 2R0, no macroscopic residual disease  Partial left hepatectomy level 3    Diagnosis: Mass of left ovary [N83.8]  Diagnosis Additional Information: No value filed.    Anesthesia Type:   General     Note:    Oropharynx: oral airway in place and spontaneously breathing  Level of Consciousness: drowsy  Oxygen Supplementation: nasal cannula  Level of Supplemental Oxygen (L/min / FiO2): 3  Independent Airway: airway patency satisfactory and stable  Dentition: dentition unchanged  Vital Signs Stable: post-procedure vital signs reviewed and stable  Report to RN Given: handoff report given  Patient transferred to: PACU    Handoff Report: Identifed the Patient, Identified the Reponsible Provider, Reviewed the pertinent medical history, Discussed the surgical course, Reviewed Intra-OP anesthesia mangement and issues during anesthesia, Set expectations for post-procedure period and Allowed opportunity for questions and acknowledgement of understanding      Vitals: (Last set prior to Anesthesia Care Transfer)  CRNA VITALS  4/6/2021 1658 - 4/6/2021 1734      4/6/2021             NIBP:  (!) 135/95    NIBP Mean:  110    Ht Rate:  105    SpO2:  100 %        Electronically Signed By: PAOLO Delacruz CRNA  April 6, 2021  5:34 PM

## 2021-04-06 NOTE — DISCHARGE SUMMARY
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  - Endometriosis  - Acute blood loss anemia, requiring transfusion  - Hematuria, resolved    Patient Active Problem List   Diagnosis     Chronic diarrhea     Undiagnosed cardiac murmurs     Multiple joint pain     Abdominal pain, generalized     Nausea     Other ascites     Mass of left ovary       Procedures:   Diagnostic laparoscopy with biopsies, converted to exploratory laparotomy, modified radical hysterectomy, bilateral salpingo-oophorectomy, bilateral ureterolysis, bladder peritonectomy, omentectomy, diaphragm stripping, liver mobilization, colon mobilization, small bowel serosa oversewing, resection of abdominal wall nodule.  Partial hepatectomy performed by Surgical Oncology (Dr. Zuñiga).     Prior to Admission Medications:  Medications Prior to Admission   Medication Sig Dispense Refill Last Dose     [DISCONTINUED] ACETAMINOPHEN PO Take 325-650 mg by mouth every 6 hours as needed (mild pain)    4/5/2021 at 1600     [DISCONTINUED] oxyCODONE (ROXICODONE) 5 MG tablet Take 5 mg by mouth every 6 hours as needed for severe pain   4/5/21 at 2100     Discharge Medications:     Review of your medicines      START taking      Dose / Directions   enoxaparin ANTICOAGULANT 40 MG/0.4ML syringe  Commonly known as: LOVENOX  Used for: Postoperative state      Dose: 40 mg  Inject 0.4 mLs (40 mg) Subcutaneous every 24 hours for 23 days  Quantity: 9.2 mL  Refills: 0     ibuprofen 600 MG tablet  Commonly known as: ADVIL/MOTRIN  Used for: Postoperative state      Dose: 600 mg  Take 1 tablet (600 mg) by mouth every 6  hours as needed for moderate pain  Quantity: 60 tablet  Refills: 0     polyethylene glycol 17 g packet  Commonly known as: MIRALAX  Used for: Postoperative state      Dose: 1 packet  Take 17 g by mouth 2 times daily as needed for constipation  Quantity: 10 packet  Refills: 1     senna-docusate 8.6-50 MG tablet  Commonly known as: SENOKOT-S/PERICOLACE  Used for: Postoperative state      Dose: 2 tablet  Take 2 tablets by mouth 2 times daily as needed for constipation  Quantity: 60 tablet  Refills: 0        CONTINUE these medicines which may have CHANGED, or have new prescriptions. If we are uncertain of the size of tablets/capsules you have at home, strength may be listed as something that might have changed.      Dose / Directions   acetaminophen 325 MG tablet  Commonly known as: TYLENOL  This may have changed:     medication strength    how much to take    when to take this    reasons to take this  Used for: Postoperative state      Dose: 650 mg  Take 2 tablets (650 mg) by mouth every 6 hours  Quantity: 60 tablet  Refills: 0     oxyCODONE 5 MG tablet  Commonly known as: ROXICODONE  This may have changed:     how much to take    when to take this    reasons to take this  Used for: Postoperative state      Dose: 10 mg  Take 2 tablets (10 mg) by mouth every 4 hours as needed for pain  Quantity: 36 tablet  Refills: 0           Where to get your medicines      These medications were sent to [facility] [place], [place] - [address]  [address] [place] [address]    Phone: [phone]     acetaminophen 325 MG tablet    enoxaparin ANTICOAGULANT 40 MG/0.4ML syringe    ibuprofen 600 MG tablet    polyethylene glycol 17 g packet    senna-docusate 8.6-50 MG tablet     Some of these will need a paper prescription and others can be bought over the counter. Ask your nurse if you have questions.    Bring a paper prescription for each of these medications    oxyCODONE 5 MG tablet          Consultations:   None    Brief History of Illness:  40 year old woman with a recent diagnosis of pelvic mass and carcinomatosis. She has a history of G3, P3, had a  section followed by 2 VBACs. Regular cycles every 27 days.  She had been on oral birth control pills in her early teens and 20s for about 6 years. She has a long-standing history of irritable bowel syndrome, started having more GI symptoms in late January followed by abdominal distention. Recent CT revealed ascites, possible carcinomatosis and a pelvic mass. CA-125 elevated to 191.  Given these findings she was subsequently sent to the [facility] and surgery was ultimately recommended.     Hospital Course:  Dz:   - Preoperative diagnosis was pelvic mass, carcinomatosis, and elevated . Frozen section at the time of the surgery was consistent with low grade serous ovarian cancer. Final pathology is pending at the time of discharge.  She will follow-up postoperatively for a care plan.  FEN:   - She was maintained on IVF until POD#1, when her diet was slowly advanced. By discharge, she was tolerating a regular diet without nausea and vomiting and able to maintain her hydration without IVF supplementation.  Pain:   - She received TAPs block postoperatively.Her pain was initially controlled with dilaudid. Once tolerating PO pain meds, she was transitioned to a PO pain regimen. Her pain was well controlled on this and she was discharged home with these medications.  CV:   - She has no history of CV issues.  Her vital signs were stable while in house and she had no acute CV issues.  PULM:   - She has no history of pulmonary issues. She was initially given O2 supplementation in to maintain her O2 sats in the immediate postop period and was transitioned off of this without difficulty.  By discharge, her O2 sats were greater than 94% on RA.  She was encouraged to use her bedside IS while in house. She had no acute pulmonary issues  while in house.  HEME:   - Her preoperative Hgb was 10.4.  Her hgb dropped to 6.6 postoperatively and she was transfused 1 unit of PRBC. On POD#2 she was noted to have some hematuria, see . Her HGB stu to 8.5 and was stable at 8.2 at the time of discharge.  She had no other acute heme issues while in house.  GI:   - She was made NPO prior to the procedure. On POD#0, her diet was advanced slowly as tolerated. CT performed on POD#3 showed dilated small bowel concerning for adynamic ileus. The patient had flatus and no nausea/vomiting. Her diet was advanced. At the time of discharge, she was tolerating a regular diet without nausea and vomiting and had bowel movements.  She will be discharged with a bowel regimen to prevent constipation in the postoperative period.  She had no acute GI issues while in house.  :    -  A bojorquez catheter was placed at the time of the surgery. Once ambulating unassisted, the bojorquez catheter was removed on POD#1. When she began voiding after bojorquez removal, her urine was dark appearing and she developed gross hematuria on the evening of POD#2 and the bojorquez was replaced. UA + 60 ketones, 100 protein albumin, large blood, and small leukocytes. Patient had extensive peritoneal stripping along the bladder due to disease. CT urogram obtained without concern for ureteral or bladder injury.  Prior to discharge, the patient was voiding spontaneously without difficulty. Urine was clear.   ID:   - The patient was afebrile during her hospitalization. She received standard preoperative antibiotics without incident.    ENDO:   - No acute issues  PSYCH/NEURO:   - No acute issues  PPX:    - She was given SCDs, IS, PPI and lovenox during her hospital course. She tolerated these prophylactic interventions without incident. She was continued on Lovenox ppx with plan for 28 day course.    Discharge Instructions and Follow up:  Ms. Kayy Salazar was discharged from the hospital with follow up with   Jing on 4/21.     Discharge Diet: Regular  Discharge Activity: Activity as tolerated  Discharge Follow up: 4/21/21 Luis Ch MD      Discharge Disposition:  Discharged to home    Discharge Staff: MD Evelio Manzanares MD  OB/GYN PGY-2  04/11/21 6:58 AM    PATHOLOGY DIAGNOSIS ADDENDUM:  FINAL DIAGNOSIS:   A. OVARY, RIGHT, BIOPSY:   - Serous borderline tumor     B. NODULE, CUL DE SAC, EXCISION:   - Positive for serous carcinoma, low grade     C. OMENTUM, BIOPSY:   - Positive for serous carcinoma     D. OVARY AND FALLOPIAN TUBE, LEFT, SALPINGO-OOPHORECTOMY:   - Serous carcinoma of the ovary, low grade on a background of a borderline    tumor   - Multiple follicular cysts   - Fallopian tube with serosal involvement by low grade serous carcinoma     E. UTERUS, CERVIX, RIGHT FALLOPIAN TUBE AND OVARY, HYSTERECTOMY WITH RIGHT    SALPINGO-OOPHORECTOMY:   - Secretory-type endometrium   - Leiomyomas and one adenomyoma   - Uterine serosal involvement by low grade serous carcinoma,   endometriosis/endosalpingiosis and fibrous   adhesions   - Ovary with surface serous borderline tumor and fibrous adhesions   - Fallopian tube serosal inflammation and adhesions     F. NODULE, CUL DE SAC, EXCISION #2:   - Serosal hyperplasia and calcification   - Fibrous adhesions   - Negative for malignancy     G. NODULE, LEFT PELVIC SIDEWALL, EXCISION:   - Fibroadipose tissue, positive for low grade serous carcinoma     H. FALCIFORM LIGAMENT, BIOPSY:   - Fibroadipose tissue with endosalpingiosis   - Negative for malignancy     I. OMENTUM, OMENTECTOMY:   - Omental adipose tissue, positive for low grade serous carcinoma   (non-invasive implants)   - Omental lymph node positive for metastatic low grade serous carcinoma,   and endosalpingiosis     J. NODULE, RIGHT LOWER QUADRANT ABDOMINAL WALL, EXCISION:   - Fibroadipose tissue, positive for low grade serous carcinoma     K. RIGHT EVERETT-DIAPHRAGM PERITONEUM, EXCISION:   -  Fibroadipose tissue, positive for low grade serous carcinoma   - Endosalpingiosis, calcifications and fibrous adhesions     L. NODULE, LIVER SURFACE, EXCISION:   - Hepatic parenchyma and capsular endosalpingiosis   - Negative for malignancy     M. NODULE, SMALL BOWEL SEROSAL, EXCISION:   - Serosa with inflammation, necrosis and mesothelial hyperplasia   - Negative for malignancy     N. NODULE, SMALL BOWEL MESENTERY, EXCISION:   - Fibroadipose tissue, positive for low grade serous carcinoma   - Serosa with inflammation, necrosis and mesothelial hyperplasia     O. NODULE, SMALL BOWEL MESENTERY, EXCISION #2:   - Serosa with inflammation, necrosis and mesothelial hyperplasia     P. LIVER, PARTIAL LEFT HEPATECTOMY LEVEL 3:   - Focal nodular hyperplasia, FNH; negative for malignancy     (with typical trichrome stain and map-like pattern on glutamine   synthetase immunohistochemistry, with   appropriate controls).    Addendum added for Ruba Coppola on 04/20/2021 by Fiorella Colmenares MD  Gynecologic Oncology  [facility]

## 2021-04-07 LAB
ANION GAP SERPL CALCULATED.3IONS-SCNC: 9 MMOL/L (ref 3–14)
BUN SERPL-MCNC: 11 MG/DL (ref 7–30)
CALCIUM SERPL-MCNC: 8 MG/DL (ref 8.5–10.1)
CHLORIDE SERPL-SCNC: 103 MMOL/L (ref 94–109)
CO2 SERPL-SCNC: 25 MMOL/L (ref 20–32)
CREAT SERPL-MCNC: 0.78 MG/DL (ref 0.52–1.04)
ERYTHROCYTE [DISTWIDTH] IN BLOOD BY AUTOMATED COUNT: 17.4 % (ref 10–15)
GFR SERPL CREATININE-BSD FRML MDRD: >90 ML/MIN/{1.73_M2}
GLUCOSE SERPL-MCNC: 136 MG/DL (ref 70–99)
HCT VFR BLD AUTO: 29.8 % (ref 35–47)
HGB BLD-MCNC: 9.4 G/DL (ref 11.7–15.7)
MCH RBC QN AUTO: 24.3 PG (ref 26.5–33)
MCHC RBC AUTO-ENTMCNC: 31.5 G/DL (ref 31.5–36.5)
MCV RBC AUTO: 77 FL (ref 78–100)
PLATELET # BLD AUTO: 298 10E9/L (ref 150–450)
POTASSIUM SERPL-SCNC: 4.3 MMOL/L (ref 3.4–5.3)
RBC # BLD AUTO: 3.87 10E12/L (ref 3.8–5.2)
SODIUM SERPL-SCNC: 137 MMOL/L (ref 133–144)
WBC # BLD AUTO: 11.7 10E9/L (ref 4–11)

## 2021-04-07 PROCEDURE — 120N000002 HC R&B MED SURG/OB UMMC

## 2021-04-07 PROCEDURE — 258N000003 HC RX IP 258 OP 636: Performed by: STUDENT IN AN ORGANIZED HEALTH CARE EDUCATION/TRAINING PROGRAM

## 2021-04-07 PROCEDURE — 99024 POSTOP FOLLOW-UP VISIT: CPT | Performed by: OBSTETRICS & GYNECOLOGY

## 2021-04-07 PROCEDURE — 250N000011 HC RX IP 250 OP 636: Performed by: NURSE PRACTITIONER

## 2021-04-07 PROCEDURE — 250N000011 HC RX IP 250 OP 636: Performed by: STUDENT IN AN ORGANIZED HEALTH CARE EDUCATION/TRAINING PROGRAM

## 2021-04-07 PROCEDURE — 36415 COLL VENOUS BLD VENIPUNCTURE: CPT | Performed by: STUDENT IN AN ORGANIZED HEALTH CARE EDUCATION/TRAINING PROGRAM

## 2021-04-07 PROCEDURE — 80048 BASIC METABOLIC PNL TOTAL CA: CPT | Performed by: STUDENT IN AN ORGANIZED HEALTH CARE EDUCATION/TRAINING PROGRAM

## 2021-04-07 PROCEDURE — 250N000013 HC RX MED GY IP 250 OP 250 PS 637: Performed by: STUDENT IN AN ORGANIZED HEALTH CARE EDUCATION/TRAINING PROGRAM

## 2021-04-07 PROCEDURE — 85027 COMPLETE CBC AUTOMATED: CPT | Performed by: STUDENT IN AN ORGANIZED HEALTH CARE EDUCATION/TRAINING PROGRAM

## 2021-04-07 PROCEDURE — 250N000013 HC RX MED GY IP 250 OP 250 PS 637: Performed by: NURSE PRACTITIONER

## 2021-04-07 RX ORDER — ACETAMINOPHEN 325 MG/1
650 TABLET ORAL EVERY 6 HOURS PRN
Status: DISCONTINUED | OUTPATIENT
Start: 2021-04-07 | End: 2021-04-09

## 2021-04-07 RX ORDER — METHOCARBAMOL 500 MG/1
500 TABLET, FILM COATED ORAL EVERY 6 HOURS PRN
Status: DISCONTINUED | OUTPATIENT
Start: 2021-04-07 | End: 2021-04-11 | Stop reason: HOSPADM

## 2021-04-07 RX ORDER — HYDROMORPHONE HYDROCHLORIDE 1 MG/ML
0.3 INJECTION, SOLUTION INTRAMUSCULAR; INTRAVENOUS; SUBCUTANEOUS
Status: DISCONTINUED | OUTPATIENT
Start: 2021-04-07 | End: 2021-04-11 | Stop reason: HOSPADM

## 2021-04-07 RX ORDER — OXYCODONE HYDROCHLORIDE 5 MG/1
5 TABLET ORAL EVERY 6 HOURS PRN
Status: ON HOLD | COMMUNITY
End: 2021-04-10

## 2021-04-07 RX ORDER — IBUPROFEN 600 MG/1
600 TABLET, FILM COATED ORAL EVERY 6 HOURS
Status: DISCONTINUED | OUTPATIENT
Start: 2021-04-07 | End: 2021-04-11 | Stop reason: HOSPADM

## 2021-04-07 RX ADMIN — POLYETHYLENE GLYCOL 3350 17 G: 17 POWDER, FOR SOLUTION ORAL at 08:21

## 2021-04-07 RX ADMIN — IBUPROFEN 600 MG: 600 TABLET ORAL at 19:25

## 2021-04-07 RX ADMIN — KETOROLAC TROMETHAMINE 30 MG: 30 INJECTION, SOLUTION INTRAMUSCULAR; INTRAVENOUS at 06:30

## 2021-04-07 RX ADMIN — KETOROLAC TROMETHAMINE 30 MG: 30 INJECTION, SOLUTION INTRAMUSCULAR; INTRAVENOUS at 14:21

## 2021-04-07 RX ADMIN — OXYCODONE HYDROCHLORIDE 10 MG: 5 TABLET ORAL at 08:21

## 2021-04-07 RX ADMIN — METHOCARBAMOL 500 MG: 500 TABLET, FILM COATED ORAL at 14:17

## 2021-04-07 RX ADMIN — METHOCARBAMOL 500 MG: 500 TABLET, FILM COATED ORAL at 09:23

## 2021-04-07 RX ADMIN — SODIUM CHLORIDE, POTASSIUM CHLORIDE, SODIUM LACTATE AND CALCIUM CHLORIDE: 600; 310; 30; 20 INJECTION, SOLUTION INTRAVENOUS at 04:48

## 2021-04-07 RX ADMIN — OXYCODONE HYDROCHLORIDE 10 MG: 5 TABLET ORAL at 11:59

## 2021-04-07 RX ADMIN — OXYCODONE HYDROCHLORIDE 10 MG: 5 TABLET ORAL at 19:25

## 2021-04-07 RX ADMIN — METHOCARBAMOL 500 MG: 500 TABLET, FILM COATED ORAL at 19:25

## 2021-04-07 RX ADMIN — OXYCODONE HYDROCHLORIDE 10 MG: 5 TABLET ORAL at 15:51

## 2021-04-07 RX ADMIN — KETOROLAC TROMETHAMINE 30 MG: 30 INJECTION, SOLUTION INTRAMUSCULAR; INTRAVENOUS at 01:01

## 2021-04-07 RX ADMIN — ONDANSETRON 4 MG: 4 TABLET, ORALLY DISINTEGRATING ORAL at 06:31

## 2021-04-07 RX ADMIN — HYDROMORPHONE HYDROCHLORIDE 0.2 MG: 0.2 INJECTION, SOLUTION INTRAMUSCULAR; INTRAVENOUS; SUBCUTANEOUS at 00:07

## 2021-04-07 RX ADMIN — MAGNESIUM HYDROXIDE 15 ML: 400 SUSPENSION ORAL at 08:21

## 2021-04-07 RX ADMIN — ENOXAPARIN SODIUM 40 MG: 100 INJECTION SUBCUTANEOUS at 15:51

## 2021-04-07 RX ADMIN — OXYCODONE HYDROCHLORIDE 10 MG: 5 TABLET ORAL at 03:14

## 2021-04-07 RX ADMIN — HYDROMORPHONE HYDROCHLORIDE 0.3 MG: 1 INJECTION, SOLUTION INTRAMUSCULAR; INTRAVENOUS; SUBCUTANEOUS at 18:14

## 2021-04-07 ASSESSMENT — ACTIVITIES OF DAILY LIVING (ADL)
ADLS_ACUITY_SCORE: 18
ADLS_ACUITY_SCORE: 14
ADLS_ACUITY_SCORE: 18

## 2021-04-07 ASSESSMENT — MIFFLIN-ST. JEOR: SCORE: 1120.37

## 2021-04-07 NOTE — PROGRESS NOTES
Admitted/transferred from: PACU  2 RN full   skin assessment completed by Camila William, RN and Lenore Hendrickson RN.  Skin assessment finding: issues found midline abd incision with dressing, drainage marked   Interventions/actions: skin interventions preventive mepilex on coccyx     Will continue to monitor.

## 2021-04-07 NOTE — PLAN OF CARE
No  complaints of nausea. Reports good pain control with po oxycodone times one and iv dilaudid times one. Stood briefly at bedside, reporting some lightheadedness. Abdominal binder in place.Tolerating clear liquids. Vitals stable. IS education and up to 500 ml. Robin intact and patent. Midline incision dressing with outlined drainage and some shadow.

## 2021-04-07 NOTE — PHARMACY-ADMISSION MEDICATION HISTORY
Admission Medication History Completed by Pharmacy    See [facility] Admission Navigator for allergy information, preferred outpatient pharmacy, prior to admission medications and immunization status.     Medication History Sources:     Patient    Changes made to PTA medication list (reason):    Added: None    Deleted: None    Changed: oxycodone to the immediate release tablet instead of ER tablet    Additional Information:    None    Prior to Admission medications    Medication Sig Last Dose Taking? Auth Provider   ACETAMINOPHEN PO Take 325-650 mg by mouth every 6 hours as needed (mild pain)  4/5/2021 at 1600 Yes Reported, Patient   oxyCODONE (ROXICODONE) 5 MG tablet Take 5 mg by mouth every 6 hours as needed for severe pain 4/5/21 at 2100 Yes Unknown, Entered By History       Also confirmed drug allergy information.    Date completed: 04/07/21    Medication history completed by: Andy J. Kurtzweil, RPH

## 2021-04-07 NOTE — PROGRESS NOTES
"Gynecology Oncology Progress Note  04/07/21    POD# 1 s/p Dx L/s, biopsies>XL, modified rad hyst, BSO, ureterolysis, bladder peritonectomy, omentectomy, partial hepatectomy, diaphragm stripping, liver/colon mobilization, bowel oversewing, resection of abdominal wall nodule    Disease: Low grade serous ovarian cancer    24 hour events:  - Postop procedure above  - Admitted to hospital  - Pain meds since PACU: dilaudid 2mg x 1, toradol 30mg x1, oxycodone 5mg x1 & 10mg x1     Subjective: Patient is feeling okay.  Pain is about 6/10. Some nausea is starting. Tolerating some water but has not had any crackers or other food yet. Dangled and stood by the bed overnight but got dizzy. Not yet passing flatus. Robin still in place.    Objective:   Patient Vitals for the past 24 hrs:   BP Temp Temp src Pulse Resp SpO2 Height Weight   04/07/21 0205 123/71 -- -- 73 -- -- -- --   04/07/21 0135 123/66 98.4  F (36.9  C) Oral 76 20 100 % -- --   04/06/21 2230 118/69 98.3  F (36.8  C) Oral 107 20 99 % -- --   04/06/21 2130 120/75 -- -- 99 15 99 % -- --   04/06/21 2025 124/88 -- -- 98 22 97 % -- --   04/06/21 1957 135/84 98.7  F (37.1  C) Oral 105 18 99 % -- --   04/06/21 1930 (!) 136/98 100  F (37.8  C) Skin 113 22 99 % -- --   04/06/21 1915 (!) 139/94 -- -- 96 14 100 % -- --   04/06/21 1900 (!) 142/96 100  F (37.8  C) Skin 105 21 100 % -- --   04/06/21 1845 (!) 136/92 99.9  F (37.7  C) Skin 98 12 100 % -- --   04/06/21 1830 (!) 129/90 -- -- 100 11 100 % -- --   04/06/21 1815 (!) 140/93 100.1  F (37.8  C) Skin 97 12 100 % -- --   04/06/21 1800 (!) 141/104 100  F (37.8  C) Skin 88 14 100 % -- --   04/06/21 1745 (!) 136/95 -- -- 93 25 100 % -- --   04/06/21 1730 (!) 135/95 99.7  F (37.6  C) Skin 107 17 100 % -- --   04/06/21 0725 118/75 98.3  F (36.8  C) Oral 82 17 100 % 1.575 m (5' 2\") 49.7 kg (109 lb 9.1 oz)     General: NAD, appears comfortable in bed  CV: RRR, no m/r/g  Resp: CTAB, no wheezes  Abdomen: soft, appropriately " tender  Incision: c/d/i, dressing with shadowing which is stable  Extremities: warm, well-perfused, nontender, trace edema, SCDs in place    Intake:  4747cc IVF, 50cc PO prior to midnight // 964cc IVF, 0cc PO since midnight     Output:  895 ml UOP prior to midnight // no UOP charted since midnight    Lines/Drains:   PIV, bojorquez    New labs/imaging-  AM CBC and CMP pending    Assessment: 40 year old POD#1 s/p Dx L/s, biopsies>XL, modified rad hyst, BSO, ureterolysis, bladder peritonectomy, omentectomy, partial hepatectomy, diaphragm stripping, liver/colon mobilization, bowel oversewing, resection of abdominal wall nodule    Dz: Low grade serous ovarian cancer s/p optimal debulking  FEN: ADAT  Pain: S/p TAPs. Ibuprofen, oxy, dilaudid. Holding tylenol due to hepatic resection  Heme: Post-operative acute on chronic anemia, pt asymptomatic  CV: NI  Pulm: NI  GI: IBS. Bowel regimen, anti-emetics prn.  : Remove bojorquez later today if UOP appropriate  ID: NI  Endo: NI  Psych/Neuro/MSK/Other: NI  PPX: SCDs   Dispo: When meeting postop goals  Drains/Lines: PIV, Bojorquez    Aarti Patel MD MSc  OBGYN Resident, PGY2  April 7, 2021, 6:16 AM        IAbdon MD personally examined and evaluated this patient on 04/07/21.  I discussed the patient with the resident and care team, and agree with the assessment and plan of care as documented in the residents note above.    I personally reviewed vital signs, laboratory values and imaging results.    POD#1 s/p optimal debulking for low grade serous ovarian carcinoma.  Routine post-operative cares.    Ruba Colmenares MD  Gynecologic Oncology  [facility]

## 2021-04-07 NOTE — PLAN OF CARE
VSS.  O2 sats stable on RA.  Only using IS fair, lungs CTA. Abdomen rounded, slight distension. Denies flatus. Drinking fluids slowly.  Ambulated x 2 with 2 assist. Robin removed; DTV 2000.  Pain managed on current plan.

## 2021-04-07 NOTE — OR NURSING
Dr. Wilkerson was called for a sign out.  Stated he would do that and it is ok for patient to go to floor.

## 2021-04-07 NOTE — PROGRESS NOTES
Gynecology Oncology Progress Note  04/08/21    POD#2 s/p Dx L/s, biopsies>XL, modified rad hyst, BSO, ureterolysis, bladder peritonectomy, omentectomy, partial hepatectomy, diaphragm stripping, liver/colon mobilization, bowel oversewing, resection of abdominal wall nodule    Disease: Low grade serous ovarian cancer    24 hour events:  - Bojorquez removed  - IVF discontinued  - Lovenox started    Subjective:   Patient doing ok this morning. Pain is relatively well controlled. Ambulated the halls x2 with assist. Voiding adequately. Not yet passing gas. Had soup and crackers yesterday but still does not have a strong appetite. No nausea or vomiting. Denies chest pain, shortness of breath.     Objective:   Patient Vitals for the past 24 hrs:   BP Temp Temp src Pulse Resp SpO2 Weight   04/07/21 1443 105/62 98.9  F (37.2  C) Oral 75 16 95 % --   04/07/21 1148 116/58 98.1  F (36.7  C) Oral 74 20 97 % --   04/07/21 1038 -- -- -- -- -- -- 50.7 kg (111 lb 12.8 oz)   04/07/21 0806 119/58 98.5  F (36.9  C) Oral 75 22 95 % --   04/07/21 0205 123/71 -- -- 73 -- -- --   04/07/21 0135 123/66 98.4  F (36.9  C) Oral 76 20 100 % --   04/06/21 2230 118/69 98.3  F (36.8  C) Oral 107 20 99 % --   04/06/21 2130 120/75 -- -- 99 15 99 % --   04/06/21 2025 124/88 -- -- 98 22 97 % --   04/06/21 1957 135/84 98.7  F (37.1  C) Oral 105 18 99 % --   04/06/21 1930 (!) 136/98 100  F (37.8  C) Skin 113 22 99 % --   04/06/21 1915 (!) 139/94 -- -- 96 14 100 % --   04/06/21 1900 (!) 142/96 100  F (37.8  C) Skin 105 21 100 % --     General: NAD, appears comfortable in bed  CV: Well perfused  Resp: Non-labored breathing  Abdomen: Soft, appropriately tender, moderately distended and tympanitic  Incision: C/D/I with steri strips in place  Extremities: Warm, well-perfused, nontender, trace edema, SCDs in place    Intake/Output  Last 24 hours//Since midnight  PO: 1400//-  IV: 2087//-  U: 1550+1x//250    Lines/Drains:   PIV, bojorquez    New labs/imaging:  None      Assessment:   40 year old POD#2 s/p Dx L/s, biopsies>XL, modified rad hyst, BSO, ureterolysis, bladder peritonectomy, omentectomy, partial hepatectomy, diaphragm stripping, liver/colon mobilization, bowel oversewing, resection of abdominal wall nodule. Optimally debulked to no gross residual disease. Starting to meet postoperative milestones.    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  FEN: ADAT.   Pain: S/p TAPs. Ibuprofen, oxy, dilaudid, Tylenol, robaxin.  Heme: Post-operative acute on chronic anemia, asymptomatic.  CV: NI  Pulm: NI  GI: IBS. Bowel regimen, anti-emetics prn. Encouraged ambulation.  : S/p Robin, voiding adequately.  ID: NI  Endo: NI  Psych/Neuro/MSK/Other: NI  PPX: SCDs, Lovenox  Dispo: When meeting postop goals, likely POD#3-4.  Drains/Lines: PIV    Rufino Harris MD  OB/GYN Resident, PGY-3  4/8/2021 6:57 AM       IAbdon MD personally examined and evaluated this patient on 04/08/21.  I discussed the patient with the resident and care team, and agree with the assessment and plan of care as documented in the residents note above.    I personally reviewed vital signs, laboratory values and imaging results.    Pt doing well today.  Pain much improved from yesterday.  Cont routine post-op goals, likely tomorrow.    Ruba Colmenares MD  Gynecologic Oncology  [facility]

## 2021-04-07 NOTE — ANESTHESIA POSTPROCEDURE EVALUATION
Patient: Kayy Salazar    Procedure(s):  Diagnostic laparoscopy with biopsies converted to exploratory laparotmy, modified radical hysterectomy bilateral salpingo-oophorectomy, bilateral ureterolysis, peritonectomy, omentectomy, resection of liver nodule, diapraghm stripping, liver mobilization, colon mobilization, resection of abdominal wall nodule, tumor debulking 2R0, no macroscopic residual disease  Partial left hepatectomy level 3    Diagnosis:Mass of left ovary [N83.8]  Diagnosis Additional Information: No value filed.    Anesthesia Type:  General    Note:  Disposition: Admission   Postop Pain Control: Uneventful            Sign Out: Well controlled pain   PONV: No   Neuro/Psych: Uneventful            Sign Out: Acceptable/Baseline neuro status   Airway/Respiratory: Uneventful            Sign Out: Acceptable/Baseline resp. status   CV/Hemodynamics: Uneventful            Sign Out: Acceptable CV status   Other NRE: NONE   DID A NON-ROUTINE EVENT OCCUR? No         Last vitals:  Vitals:    04/06/21 2025 04/06/21 2130 04/06/21 2230   BP: 124/88 120/75 118/69   Pulse: 98 99 107   Resp: 22 15 20   Temp:   36.8  C (98.3  F)   SpO2: 97% 99% 99%       Last vitals prior to Anesthesia Care Transfer:  CRNA VITALS  4/6/2021 1658 - 4/6/2021 1758      4/6/2021             NIBP:  (!) 135/95    NIBP Mean:  110    Ht Rate:  105    SpO2:  100 %    EKG:  Sinus rhythm          Electronically Signed By: Hua Wilkerson MD  April 7, 2021  1:02 AM

## 2021-04-07 NOTE — PLAN OF CARE
Assumed care of Patient from [id number]. VSS. Patient reports good pain control with Prn dilaudid and oxy.Tolerating ice chips overnight with no nausea. Patient reports not passing flatus quite yet. Pt bojorquez catheter adequate output. Abd incision covered with scant drainage and marked. Has not wanted to get out of bed overnight, been microturning. Pt able to do small repositions by self in bed. Will continue plan of care.

## 2021-04-07 NOTE — PROGRESS NOTES
"Gynecologic Oncology Postoperative Check Note  4/6/2021    S: Patient reports she is doing relatively well postoperatively. Pain is moderately well controlled with po pain medications and recent dose of IV dilaudid. Has not yet ambulated. Tolerating water without nausea or vomiting. Denies chest pain, shortness of breath, dizziness, or other concerns at this time.     O:  Patient Vitals for the past 24 hrs:   BP Temp Temp src Pulse Resp SpO2 Height Weight   04/06/21 2025 124/88 -- -- 98 22 97 % -- --   04/06/21 1957 135/84 98.7  F (37.1  C) Oral 105 18 99 % -- --   04/06/21 1930 (!) 136/98 100  F (37.8  C) Skin 113 22 99 % -- --   04/06/21 1915 (!) 139/94 -- -- 96 14 100 % -- --   04/06/21 1900 (!) 142/96 100  F (37.8  C) Skin 105 21 100 % -- --   04/06/21 1845 (!) 136/92 99.9  F (37.7  C) Skin 98 12 100 % -- --   04/06/21 1830 (!) 129/90 -- -- 100 11 100 % -- --   04/06/21 1815 (!) 140/93 100.1  F (37.8  C) Skin 97 12 100 % -- --   04/06/21 1800 (!) 141/104 100  F (37.8  C) Skin 88 14 100 % -- --   04/06/21 1745 (!) 136/95 -- -- 93 25 100 % -- --   04/06/21 1730 (!) 135/95 99.7  F (37.6  C) Skin 107 17 100 % -- --   04/06/21 0725 118/75 98.3  F (36.8  C) Oral 82 17 100 % 1.575 m (5' 2\") 49.7 kg (109 lb 9.1 oz)     Gen: NAD  Cardio: RRR, S1/S2, no murmurs  Resp: CTAB, no wheezing or crackles  Abdomen: soft, appropriately tender, VML incision with mild shadowing on dressing, no ongoing bleeding. Robin in place.   Extremities: Non-tender, trace edema, SCDs in place    A/P: 40 year old POD#0 s/p Dx L/s, biopsies>XL, modified rad hyst, BSO, ureterolysis, bladder peritonectomy, omentectomy, partial hepatectomy, diaphragm stripping, liver/colon mobilization, bowel oversewing, resection of abdominal wall nodule.    Dz: Pelvic mass, carcinomatosis. Low grade serous ovarian cancer.  FEN: ADAT  Pain: S/p TAPs. Ibuprofen, oxy, dilaudid  Heme: Hgb 10.4> >  CV: NI  Pulm: NI  GI: IBS. Bowel regimen, anti-emetics prn.  : " Lobito  ID: S/p intraop abx.  Endo: NI  Psych/Neuro/MSK/Other: NI  PPX: SCDs   Dispo: When meeting postop goals  Drains/Lines: Lobito TSAI    Dispo: Admitted to GYN Oncology    Aarti Patel MD MSc  OBGYN Resident, PGY2  April 6, 2021, 9:09 PM

## 2021-04-07 NOTE — OP NOTE
Procedure Date: 04/06/2021      SURGEON:  Chris Zuñiga MD      SECOND SURGICAL TEAM:  Led by Dr. Luis Ch.  Please see his notes for further details regarding his case.      PREOPERATIVE DIAGNOSIS:  Indeterminate liver lesion in segment 3 of the liver.      POSTOPERATIVE DIAGNOSIS:  Segment 3 liver lesion consistent with benign focal nodular hyperplasia of the liver.      PROCEDURE: Open Partial left hepatectomy of segment 3 of the liver.      ESTIMATED BLOOD LOSS:  20 mL      SPECIMENS:  Partial left hepatectomy, segment 3.      DESCRIPTION OF PROCEDURE:  Ms. aSlazar was taken to the operating room by Dr. Ch for gynecologic cancer treatment.  During that surgery, he identified a lesion in the liver, which was clinically suspicious.  The patient had a preoperative MRI, which felt that these lesions were likely benign; however, it was somewhat indeterminate.  Therefore, Dr. Ch asked me to come to the operating room to assess the situation and potentially biopsy or remove of one of these lesions.  On gross inspection, there was a nodular lesion in segment 3 of the liver.  The liver parenchyma surrounding the tumor was otherwise normal in appearance.  I did review the MRI and agreed that these seem to have benign characteristics on MRI; however, given the lack of a definitive diagnosis, Dr. Ch and I felt it best to at least biopsy or remove one of them in order to be certain.  With that in mind, the lesion that was identified in segment 3 was easily accessible.  Using palpation of the liver, I was able to clearly identify its borders, and I marked the superficial liver capsule using the Bovie electrocautery.  We then used the vessel sealing device to divide the superficial liver parenchyma and subsequently divided the segment 3 portal pedicle using a white load Endo-TAMERA 45 stapling device.  The final remaining liver parenchyma was then divided again using the LigaSure vessel sealer.   The specimen was removed.  Hemostasis on the cut surface of the liver was achieved using the Bovie electrocautery, after which there was no evidence of any bleeding.  Similarly, there was no evidence of any bile leakage at any time.  The specimen was brought to Pathology by me and labelled as liver segments 3, left partial hepatectomy.  Frozen section of the specimen revealed findings consistent with a benign focal nodular hyperplasia.  Satisfied with that, the case was turned back over to Dr. Ch and his team.  Please see his notes for further details regarding the remainder of the case.  I was present and performed the entirety of the procedure described in this note.         MILAGROS CURRY MD             D: 2021   T: 2021   MT:       Name:     MERCY DA SILVA   MRN:      [id number]        Account:        [id number]   :      1980           Procedure Date: 2021      Document: [id number]

## 2021-04-08 ENCOUNTER — PRE VISIT (OUTPATIENT)
Dept: SURGERY | Facility: CLINIC | Age: 41
End: 2021-04-08

## 2021-04-08 LAB
ANION GAP SERPL CALCULATED.3IONS-SCNC: 6 MMOL/L (ref 3–14)
BUN SERPL-MCNC: 9 MG/DL (ref 7–30)
CALCIUM SERPL-MCNC: 7.8 MG/DL (ref 8.5–10.1)
CHLORIDE SERPL-SCNC: 107 MMOL/L (ref 94–109)
CO2 SERPL-SCNC: 26 MMOL/L (ref 20–32)
CREAT SERPL-MCNC: 0.74 MG/DL (ref 0.52–1.04)
GFR SERPL CREATININE-BSD FRML MDRD: >90 ML/MIN/{1.73_M2}
GLUCOSE SERPL-MCNC: 87 MG/DL (ref 70–99)
POTASSIUM SERPL-SCNC: 3.7 MMOL/L (ref 3.4–5.3)
SODIUM SERPL-SCNC: 140 MMOL/L (ref 133–144)

## 2021-04-08 PROCEDURE — 250N000013 HC RX MED GY IP 250 OP 250 PS 637: Performed by: STUDENT IN AN ORGANIZED HEALTH CARE EDUCATION/TRAINING PROGRAM

## 2021-04-08 PROCEDURE — 80048 BASIC METABOLIC PNL TOTAL CA: CPT | Performed by: STUDENT IN AN ORGANIZED HEALTH CARE EDUCATION/TRAINING PROGRAM

## 2021-04-08 PROCEDURE — 120N000002 HC R&B MED SURG/OB UMMC

## 2021-04-08 PROCEDURE — 36415 COLL VENOUS BLD VENIPUNCTURE: CPT | Performed by: STUDENT IN AN ORGANIZED HEALTH CARE EDUCATION/TRAINING PROGRAM

## 2021-04-08 PROCEDURE — 99024 POSTOP FOLLOW-UP VISIT: CPT | Performed by: OBSTETRICS & GYNECOLOGY

## 2021-04-08 PROCEDURE — 250N000011 HC RX IP 250 OP 636: Performed by: NURSE PRACTITIONER

## 2021-04-08 PROCEDURE — 250N000013 HC RX MED GY IP 250 OP 250 PS 637: Performed by: NURSE PRACTITIONER

## 2021-04-08 PROCEDURE — 81001 URINALYSIS AUTO W/SCOPE: CPT | Performed by: STUDENT IN AN ORGANIZED HEALTH CARE EDUCATION/TRAINING PROGRAM

## 2021-04-08 PROCEDURE — 87086 URINE CULTURE/COLONY COUNT: CPT | Performed by: OBSTETRICS & GYNECOLOGY

## 2021-04-08 RX ORDER — IBUPROFEN 600 MG/1
600 TABLET, FILM COATED ORAL EVERY 6 HOURS PRN
Status: DISCONTINUED | OUTPATIENT
Start: 2021-04-08 | End: 2021-04-11 | Stop reason: HOSPADM

## 2021-04-08 RX ADMIN — OXYCODONE HYDROCHLORIDE 5 MG: 5 TABLET ORAL at 21:30

## 2021-04-08 RX ADMIN — IBUPROFEN 600 MG: 600 TABLET ORAL at 19:34

## 2021-04-08 RX ADMIN — IBUPROFEN 600 MG: 600 TABLET, FILM COATED ORAL at 06:50

## 2021-04-08 RX ADMIN — OXYCODONE HYDROCHLORIDE 10 MG: 5 TABLET ORAL at 00:30

## 2021-04-08 RX ADMIN — OXYCODONE HYDROCHLORIDE 5 MG: 5 TABLET ORAL at 23:05

## 2021-04-08 RX ADMIN — OXYCODONE HYDROCHLORIDE 10 MG: 5 TABLET ORAL at 06:50

## 2021-04-08 RX ADMIN — OXYCODONE HYDROCHLORIDE 10 MG: 5 TABLET ORAL at 09:59

## 2021-04-08 RX ADMIN — OXYCODONE HYDROCHLORIDE 10 MG: 5 TABLET ORAL at 17:00

## 2021-04-08 RX ADMIN — ENOXAPARIN SODIUM 40 MG: 100 INJECTION SUBCUTANEOUS at 13:10

## 2021-04-08 RX ADMIN — OXYCODONE HYDROCHLORIDE 10 MG: 5 TABLET ORAL at 13:10

## 2021-04-08 RX ADMIN — IBUPROFEN 600 MG: 600 TABLET ORAL at 13:10

## 2021-04-08 RX ADMIN — POLYETHYLENE GLYCOL 3350 17 G: 17 POWDER, FOR SOLUTION ORAL at 08:21

## 2021-04-08 RX ADMIN — IBUPROFEN 600 MG: 600 TABLET ORAL at 00:20

## 2021-04-08 ASSESSMENT — ACTIVITIES OF DAILY LIVING (ADL)
ADLS_ACUITY_SCORE: 18

## 2021-04-08 NOTE — PLAN OF CARE
Assumed care of Patient from [id number]. VSS. Patient reports good pain control with PRN oxycodone. No complaints of nausea. Patient reports passing flatus  Pt voiding with adequate output. Incision covered with scant drainage on dressing, abd binder on for comfort. Up to the bathroom stand by assist. Pt able to self reposition in bed. Will continue plan of care.

## 2021-04-08 NOTE — PLAN OF CARE
Reports good pain control with po oxycodone, robaxin and scheduled ibuprofen. No complaints of nausea. Fair appetite. Good fluid intake. Vitals stable. Ambulating with standby assist once up with minimal assist of one. Voiding sufficient. Ambulating in room and to bathroom and up in chair for a few minutes. Still not passing gas. Bowel sounds distant.

## 2021-04-08 NOTE — PLAN OF CARE
VSS. Pt up with SBA. Ambulated in halls. Tolerating regular diet, Pt ate salmon cakes for lunch. Voids spont with adequate UOP. No gas, no BM. Abdominal binder on. Pain controlled with oxycodone Q3H, ibuprofen and Tylenol. Lovenox injection demonstrated for Pt and Pt's , pt stated she gave her daughter Lovenox for 6 months so is familiar with injection. Cont. POC.

## 2021-04-09 ENCOUNTER — APPOINTMENT (OUTPATIENT)
Dept: CT IMAGING | Facility: CLINIC | Age: 41
DRG: 737 | End: 2021-04-09
Attending: OBSTETRICS & GYNECOLOGY
Payer: COMMERCIAL

## 2021-04-09 LAB
ABO + RH BLD: NORMAL
ABO + RH BLD: NORMAL
ALBUMIN UR-MCNC: 100 MG/DL
ANION GAP SERPL CALCULATED.3IONS-SCNC: 7 MMOL/L (ref 3–14)
APPEARANCE UR: ABNORMAL
BILIRUB UR QL STRIP: NEGATIVE
BLD GP AB SCN SERPL QL: NORMAL
BLD PROD TYP BPU: NORMAL
BLD PROD TYP BPU: NORMAL
BLD UNIT ID BPU: 0
BLOOD BANK CMNT PATIENT-IMP: NORMAL
BLOOD BANK CMNT PATIENT-IMP: NORMAL
BLOOD PRODUCT CODE: NORMAL
BPU ID: NORMAL
BUN SERPL-MCNC: 6 MG/DL (ref 7–30)
CALCIUM SERPL-MCNC: 7.9 MG/DL (ref 8.5–10.1)
CHLORIDE SERPL-SCNC: 107 MMOL/L (ref 94–109)
CO2 SERPL-SCNC: 25 MMOL/L (ref 20–32)
COLOR UR AUTO: ABNORMAL
CREAT SERPL-MCNC: 0.64 MG/DL (ref 0.52–1.04)
ERYTHROCYTE [DISTWIDTH] IN BLOOD BY AUTOMATED COUNT: 17.6 % (ref 10–15)
GFR SERPL CREATININE-BSD FRML MDRD: >90 ML/MIN/{1.73_M2}
GLUCOSE SERPL-MCNC: 95 MG/DL (ref 70–99)
GLUCOSE UR STRIP-MCNC: NEGATIVE MG/DL
HCT VFR BLD AUTO: 22.2 % (ref 35–47)
HGB BLD-MCNC: 6.6 G/DL (ref 11.7–15.7)
HGB BLD-MCNC: 8.5 G/DL (ref 11.7–15.7)
HGB UR QL STRIP: ABNORMAL
KETONES UR STRIP-MCNC: 60 MG/DL
LEUKOCYTE ESTERASE UR QL STRIP: ABNORMAL
MCH RBC QN AUTO: 24 PG (ref 26.5–33)
MCHC RBC AUTO-ENTMCNC: 29.7 G/DL (ref 31.5–36.5)
MCV RBC AUTO: 81 FL (ref 78–100)
MUCOUS THREADS #/AREA URNS LPF: PRESENT /LPF
NITRATE UR QL: NEGATIVE
NUM BPU REQUESTED: 1
PH UR STRIP: 5.5 PH (ref 5–7)
PLATELET # BLD AUTO: 231 10E9/L (ref 150–450)
POTASSIUM SERPL-SCNC: 3.7 MMOL/L (ref 3.4–5.3)
RBC # BLD AUTO: 2.75 10E12/L (ref 3.8–5.2)
RBC #/AREA URNS AUTO: >182 /HPF (ref 0–2)
SODIUM SERPL-SCNC: 139 MMOL/L (ref 133–144)
SOURCE: ABNORMAL
SP GR UR STRIP: 1.01 (ref 1–1.03)
SPECIMEN EXP DATE BLD: NORMAL
SQUAMOUS #/AREA URNS AUTO: 2 /HPF (ref 0–1)
TRANSFUSION STATUS PATIENT QL: NORMAL
TRANSFUSION STATUS PATIENT QL: NORMAL
UROBILINOGEN UR STRIP-MCNC: NORMAL MG/DL (ref 0–2)
WBC # BLD AUTO: 5.9 10E9/L (ref 4–11)
WBC #/AREA URNS AUTO: 34 /HPF (ref 0–5)

## 2021-04-09 PROCEDURE — 250N000011 HC RX IP 250 OP 636: Performed by: OBSTETRICS & GYNECOLOGY

## 2021-04-09 PROCEDURE — 36415 COLL VENOUS BLD VENIPUNCTURE: CPT | Performed by: STUDENT IN AN ORGANIZED HEALTH CARE EDUCATION/TRAINING PROGRAM

## 2021-04-09 PROCEDURE — 85018 HEMOGLOBIN: CPT | Performed by: STUDENT IN AN ORGANIZED HEALTH CARE EDUCATION/TRAINING PROGRAM

## 2021-04-09 PROCEDURE — 74178 CT ABD&PLV WO CNTR FLWD CNTR: CPT | Mod: 26 | Performed by: RADIOLOGY

## 2021-04-09 PROCEDURE — 250N000013 HC RX MED GY IP 250 OP 250 PS 637: Performed by: NURSE PRACTITIONER

## 2021-04-09 PROCEDURE — 120N000002 HC R&B MED SURG/OB UMMC

## 2021-04-09 PROCEDURE — 85027 COMPLETE CBC AUTOMATED: CPT | Performed by: STUDENT IN AN ORGANIZED HEALTH CARE EDUCATION/TRAINING PROGRAM

## 2021-04-09 PROCEDURE — 250N000013 HC RX MED GY IP 250 OP 250 PS 637: Performed by: STUDENT IN AN ORGANIZED HEALTH CARE EDUCATION/TRAINING PROGRAM

## 2021-04-09 PROCEDURE — 258N000003 HC RX IP 258 OP 636: Performed by: STUDENT IN AN ORGANIZED HEALTH CARE EDUCATION/TRAINING PROGRAM

## 2021-04-09 PROCEDURE — 99024 POSTOP FOLLOW-UP VISIT: CPT | Performed by: OBSTETRICS & GYNECOLOGY

## 2021-04-09 PROCEDURE — 74178 CT ABD&PLV WO CNTR FLWD CNTR: CPT

## 2021-04-09 PROCEDURE — P9016 RBC LEUKOCYTES REDUCED: HCPCS

## 2021-04-09 PROCEDURE — 80048 BASIC METABOLIC PNL TOTAL CA: CPT | Performed by: STUDENT IN AN ORGANIZED HEALTH CARE EDUCATION/TRAINING PROGRAM

## 2021-04-09 RX ORDER — AMOXICILLIN 250 MG
2 CAPSULE ORAL 2 TIMES DAILY
Status: DISCONTINUED | OUTPATIENT
Start: 2021-04-09 | End: 2021-04-11 | Stop reason: HOSPADM

## 2021-04-09 RX ORDER — OXYCODONE HYDROCHLORIDE 5 MG/1
5-10 TABLET ORAL EVERY 4 HOURS PRN
Status: DISCONTINUED | OUTPATIENT
Start: 2021-04-09 | End: 2021-04-10

## 2021-04-09 RX ORDER — NALOXONE HYDROCHLORIDE 0.4 MG/ML
0.2 INJECTION, SOLUTION INTRAMUSCULAR; INTRAVENOUS; SUBCUTANEOUS
Status: DISCONTINUED | OUTPATIENT
Start: 2021-04-09 | End: 2021-04-11 | Stop reason: HOSPADM

## 2021-04-09 RX ORDER — NALOXONE HYDROCHLORIDE 0.4 MG/ML
0.4 INJECTION, SOLUTION INTRAMUSCULAR; INTRAVENOUS; SUBCUTANEOUS
Status: DISCONTINUED | OUTPATIENT
Start: 2021-04-09 | End: 2021-04-11 | Stop reason: HOSPADM

## 2021-04-09 RX ORDER — AMOXICILLIN 250 MG
1 CAPSULE ORAL 2 TIMES DAILY
Status: DISCONTINUED | OUTPATIENT
Start: 2021-04-09 | End: 2021-04-09

## 2021-04-09 RX ORDER — ACETAMINOPHEN 325 MG/1
650 TABLET ORAL EVERY 6 HOURS
Status: DISCONTINUED | OUTPATIENT
Start: 2021-04-09 | End: 2021-04-11 | Stop reason: HOSPADM

## 2021-04-09 RX ORDER — BISACODYL 10 MG
10 SUPPOSITORY, RECTAL RECTAL ONCE
Status: COMPLETED | OUTPATIENT
Start: 2021-04-09 | End: 2021-04-09

## 2021-04-09 RX ORDER — BISACODYL 10 MG
10 SUPPOSITORY, RECTAL RECTAL DAILY PRN
Status: DISCONTINUED | OUTPATIENT
Start: 2021-04-09 | End: 2021-04-11 | Stop reason: HOSPADM

## 2021-04-09 RX ORDER — IOPAMIDOL 755 MG/ML
68 INJECTION, SOLUTION INTRAVASCULAR ONCE
Status: COMPLETED | OUTPATIENT
Start: 2021-04-09 | End: 2021-04-09

## 2021-04-09 RX ADMIN — OXYCODONE HYDROCHLORIDE 10 MG: 5 TABLET ORAL at 09:21

## 2021-04-09 RX ADMIN — OXYCODONE HYDROCHLORIDE 10 MG: 5 TABLET ORAL at 06:02

## 2021-04-09 RX ADMIN — IBUPROFEN 600 MG: 600 TABLET ORAL at 19:46

## 2021-04-09 RX ADMIN — POLYETHYLENE GLYCOL 3350 17 G: 17 POWDER, FOR SOLUTION ORAL at 08:22

## 2021-04-09 RX ADMIN — IBUPROFEN 600 MG: 600 TABLET ORAL at 07:07

## 2021-04-09 RX ADMIN — IBUPROFEN 600 MG: 600 TABLET ORAL at 01:26

## 2021-04-09 RX ADMIN — IOPAMIDOL 68 ML: 755 INJECTION, SOLUTION INTRAVENOUS at 10:19

## 2021-04-09 RX ADMIN — SODIUM CHLORIDE, POTASSIUM CHLORIDE, SODIUM LACTATE AND CALCIUM CHLORIDE 500 ML: 600; 310; 30; 20 INJECTION, SOLUTION INTRAVENOUS at 00:29

## 2021-04-09 RX ADMIN — ACETAMINOPHEN 650 MG: 325 TABLET, FILM COATED ORAL at 15:18

## 2021-04-09 RX ADMIN — OXYCODONE HYDROCHLORIDE 10 MG: 5 TABLET ORAL at 18:47

## 2021-04-09 RX ADMIN — OXYCODONE HYDROCHLORIDE 10 MG: 5 TABLET ORAL at 12:21

## 2021-04-09 RX ADMIN — BISACODYL 10 MG: 10 SUPPOSITORY RECTAL at 17:37

## 2021-04-09 RX ADMIN — ACETAMINOPHEN 650 MG: 325 TABLET, FILM COATED ORAL at 21:28

## 2021-04-09 RX ADMIN — DOCUSATE SODIUM 50 MG AND SENNOSIDES 8.6 MG 2 TABLET: 8.6; 5 TABLET, FILM COATED ORAL at 08:23

## 2021-04-09 RX ADMIN — OXYCODONE HYDROCHLORIDE 10 MG: 5 TABLET ORAL at 01:56

## 2021-04-09 RX ADMIN — OXYCODONE HYDROCHLORIDE 10 MG: 5 TABLET ORAL at 23:57

## 2021-04-09 RX ADMIN — IBUPROFEN 600 MG: 600 TABLET ORAL at 13:37

## 2021-04-09 RX ADMIN — DOCUSATE SODIUM 50 MG AND SENNOSIDES 8.6 MG 2 TABLET: 8.6; 5 TABLET, FILM COATED ORAL at 19:46

## 2021-04-09 ASSESSMENT — ACTIVITIES OF DAILY LIVING (ADL)
ADLS_ACUITY_SCORE: 18

## 2021-04-09 NOTE — PROGRESS NOTES
Gynecologic Oncology Progress Note  4/9/2021      S: Patient noting some lower back pain similar to what she was feeling on Wednesday and was previously relieved by robaxin. She notes the pain is stable and has not worsened. She got out of bed earlier today and was feeling weak and lightheaded. Denies these symptoms currently lying in bed. She notes increased gas but still passing flatus. Denies N/V. Robin draining yellow urine. Notes scant dark brown vaginal spotting x 1.        O:  Vitals:    04/09/21 1104 04/09/21 1117 04/09/21 1132 04/09/21 1224   BP: 123/75 117/71 116/68 115/73   BP Location: Right arm      Pulse: 78 80 81 84   Resp: 16 16 16 16   Temp: 98.2  F (36.8  C) 97.9  F (36.6  C)  98.2  F (36.8  C)   TempSrc: Oral Oral  Oral   SpO2: 97%      Weight:       Height:           Gen: Alert and oriented. NAD.  Resp: No resp distress.   Abdomen: Moderately distended. Tympanic. No rebound tenderness or guarding.   Incision: CDI, well approximated, no erythema or drainage.  : Robin draining clear yellow urine    A/P: 40 year old POD#3 s/p Dx L/s, biopsies > XL, modified radical hyst, BSO, ureterolysis, bladder peritonectomy, omentectomy, partial hepatectomy, diaphragm stripping, liver/colon mobilization, bowel oversewing, resection of abdominal wall nodule with new gross hematuria overnight and evidence of acute blood loss anemia with drop in Hgb from 9.4>6.6. PRBC currently infusing. Stable VS. CT urogram results pending. Intermittent low back pain relieved with robaxin, likely musculoskeletal.     - Continue robaxin PRN. Patient to report worsening back pain.   - Ambulate with assist.  - Repeat hgb 4 hours following PRBC transfusion  - Hold lovenox for now, considering resuming pending lab results.           Mariana Panda, APRN DNP, CNP  4/9/2021 12:36 PM

## 2021-04-09 NOTE — PROGRESS NOTES
Gynecology Oncology Progress Note  04/09/21    POD#3 s/p Dx L/s, biopsies>XL, modified rad hyst, BSO, ureterolysis, bladder peritonectomy, omentectomy, partial hepatectomy, diaphragm stripping, liver/colon mobilization, bowel oversewing, resection of abdominal wall nodule    Disease: Low grade serous ovarian cancer    24 hour events:  - New gross hematuria, bojorquez replaced  - Pain meds in last 24h: ibuprofen 600mg x3, total of oxycodone 60mg    Subjective:   Patient doing ok this morning. Pain is relatively well controlled. Ambulated the halls x2 with assist. Voiding adequately. Not yet passing gas. Had soup and crackers yesterday but still does not have a strong appetite. No nausea or vomiting. Denies chest pain, shortness of breath.     Objective:   Patient Vitals for the past 24 hrs:   BP Temp Temp src Pulse Resp SpO2   04/09/21 0603 115/60 98.4  F (36.9  C) Oral 88 16 96 %   04/08/21 2200 112/57 -- -- 78 16 96 %   04/08/21 1701 110/62 98.1  F (36.7  C) Oral 77 16 97 %   04/08/21 0813 102/58 98.5  F (36.9  C) Oral 86 14 95 %     General: NAD, appears comfortable in bed  CV: Well perfused  Resp: Non-labored breathing  Abdomen: Soft, appropriately tender, moderately distended and tympanitic  Incision: C/D/I with steri strips in place  Extremities: Warm, well-perfused, nontender, trace edema, SCDs in place    Intake/Output  Last 24 hours//Since midnight  PO: 920//-  IV: -//500  U: 1675//550  Net: -550    Lines/Drains:   PIV, bojorquez    New labs/imaging:  Results for orders placed or performed during the hospital encounter of 04/06/21 (from the past 24 hour(s))   Basic metabolic panel   Result Value Ref Range    Sodium 140 133 - 144 mmol/L    Potassium 3.7 3.4 - 5.3 mmol/L    Chloride 107 94 - 109 mmol/L    Carbon Dioxide 26 20 - 32 mmol/L    Anion Gap 6 3 - 14 mmol/L    Glucose 87 70 - 99 mg/dL    Urea Nitrogen 9 7 - 30 mg/dL    Creatinine 0.74 0.52 - 1.04 mg/dL    GFR Estimate >90 >60 mL/min/[1.73_m2]    GFR Estimate  If Black >90 >60 mL/min/[1.73_m2]    Calcium 7.8 (L) 8.5 - 10.1 mg/dL   UA with Microscopic reflex to Culture    Specimen: Urine clean catch   Result Value Ref Range    Color Urine Orange     Appearance Urine Slightly Cloudy     Glucose Urine Negative NEG^Negative mg/dL    Bilirubin Urine Negative NEG^Negative    Ketones Urine 60 (A) NEG^Negative mg/dL    Specific Gravity Urine 1.011 1.003 - 1.035    Blood Urine Large (A) NEG^Negative    pH Urine 5.5 5.0 - 7.0 pH    Protein Albumin Urine 100 (A) NEG^Negative mg/dL    Urobilinogen mg/dL Normal 0.0 - 2.0 mg/dL    Nitrite Urine Negative NEG^Negative    Leukocyte Esterase Urine Small (A) NEG^Negative    Source Urine     WBC Urine 34 (H) 0 - 5 /HPF    RBC Urine >182 (H) 0 - 2 /HPF    Squamous Epithelial /HPF Urine 2 (H) 0 - 1 /HPF    Mucous Urine Present (A) NEG^Negative /LPF     CBC and repeat BMP pending this AM    Assessment:   40 year old POD#3 s/p Dx L/s, biopsies>XL, modified rad hyst, BSO, ureterolysis, bladder peritonectomy, omentectomy, partial hepatectomy, diaphragm stripping, liver/colon mobilization, bowel oversewing, resection of abdominal wall nodule. Optimally debulked to no gross residual disease. Starting to meet postoperative milestones, new gross hematuria likely 2/2 bladder peritoneum stripping.     Dz: Pelvic mass, carcinomatosis. Low grade serous ovarian cancer.  FEN: Reg  Pain: S/p TAPs. Tylenol, Ibuprofen, oxy, dilaudid, robaxin  Heme: Hgb 10.4> > 9.4  CV: NI  Pulm: NI  GI: IBS. Bowel regimen, anti-emetics prn.  : Cr 0.74. Gross hematuria >> replaced bojorquez on POD#3. UA w/ gross blood, LE  ID: S/p intraop abx.  Endo: NI  Psych/Neuro/MSK/Other: NI  PPX: SCDs, Lovenox  Dispo: When meeting postop goals  Drains/Lines: PIV, bojorquez    Aarti Patel MD MSc  OBGYN Resident, PGY2  April 9, 2021, 6:46 AM    IAbdon MD personally examined and evaluated this patient on 04/09/21.  I discussed the patient with the resident and care  team, and agree with the assessment and plan of care as documented in the residents note above.    I personally reviewed vital signs, laboratory values and imaging results.    Pt s/p primary debulking with hematuria overnight.  Will plan CT urogram this morning to assess for urinary tract injury.  Otherwise doing well.      Ruba Colmenares MD  Gynecologic Oncology  [facility]

## 2021-04-09 NOTE — PLAN OF CARE
Pt AVSS. Pt cont to have hematuria. MD assessed at BS. Robin ordered to be reinserted at this time. Cath placed and uo cont to be salvador red tinged with small blood shreds noted. Pt received 500cc bolus of LR x1. Good uv following. Pt abd round, hypo bs, laps x2 and midline inc dermabond and with steri strips intact. Binder in place for comfort. Pt med with ibuprofen and oxycodone with good relief. Slept following. Cont to monitor for hematuria. Monitor labs this am. Maintain pain control.

## 2021-04-09 NOTE — PROGRESS NOTES
Brief progress note    Per nursing, patient has had blood in her urine but does not currently have any vaginal bleeding.  Ordered UA and BMP.  Results below.    On assessment, patient denies any dysuria, urinary urgency or frequency, back pain, vaginal bleeding.  She says there is minimal dark brown blood on the toilet tissue when she wipes.  She has noticed darker urine since her catheter was removed 2 days ago but during the day today her urine became steadily darker and now is bloody.    Patient Vitals for the past 24 hrs:   BP Temp Temp src Pulse Resp SpO2   04/08/21 2200 112/57 -- -- 78 16 96 %   04/08/21 1701 110/62 98.1  F (36.7  C) Oral 77 16 97 %   04/08/21 0813 102/58 98.5  F (36.9  C) Oral 86 14 95 %     Gen: resting comfortably in bed  CV: regular rate, well perfuse  Resp: breathing comfortably on room air  Abd: incision intact, abdomen appropriately tender to palpation, minimally distended  : 2 small spots of dark brown blood on pad, on external exam there was no blood seen at the urethral meatus or introitus    Results for orders placed or performed during the hospital encounter of 04/06/21 (from the past 24 hour(s))   Basic metabolic panel   Result Value Ref Range    Sodium 140 133 - 144 mmol/L    Potassium 3.7 3.4 - 5.3 mmol/L    Chloride 107 94 - 109 mmol/L    Carbon Dioxide 26 20 - 32 mmol/L    Anion Gap 6 3 - 14 mmol/L    Glucose 87 70 - 99 mg/dL    Urea Nitrogen 9 7 - 30 mg/dL    Creatinine 0.74 0.52 - 1.04 mg/dL    GFR Estimate >90 >60 mL/min/[1.73_m2]    GFR Estimate If Black >90 >60 mL/min/[1.73_m2]    Calcium 7.8 (L) 8.5 - 10.1 mg/dL   UA with Microscopic reflex to Culture    Specimen: Urine clean catch   Result Value Ref Range    Color Urine Orange     Appearance Urine Slightly Cloudy     Glucose Urine Negative NEG^Negative mg/dL    Bilirubin Urine Negative NEG^Negative    Ketones Urine 60 (A) NEG^Negative mg/dL    Specific Gravity Urine 1.011 1.003 - 1.035    Blood Urine Large (A)  NEG^Negative    pH Urine 5.5 5.0 - 7.0 pH    Protein Albumin Urine 100 (A) NEG^Negative mg/dL    Urobilinogen mg/dL Normal 0.0 - 2.0 mg/dL    Nitrite Urine Negative NEG^Negative    Leukocyte Esterase Urine Small (A) NEG^Negative    Source Urine     WBC Urine 34 (H) 0 - 5 /HPF    RBC Urine >182 (H) 0 - 2 /HPF    Squamous Epithelial /HPF Urine 2 (H) 0 - 1 /HPF    Mucous Urine Present (A) NEG^Negative /LPF     Intake: 920cc PO in last 24h   UOP: 1675cc in last 24h    A/P: New hematuria, UA w/ blood, LE, protein, ketones. Otherwise asymptomatic. 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 Etiology of hematuria is likely irritation from the peritoneal bladder stripping. Discussed this with the patient and noted the risk of clot formation in the bladder.     - Replace bojorquez catheter to allow monitoring for clearance of hematuria  - will give 500 cc bolus of LR  - repeat BMP, CBC in AM    Discussed w/ Mireya Ceballos, Gyn Onc Fellow    Aarti Patel MD MSc  OBGYN Resident, PGY2  April 9, 2021, 3:54 AM

## 2021-04-09 NOTE — PLAN OF CARE
POD # 2 s/p Diagnostic laparoscopy with biopsies converted to exploratory laparotmy, modified radical hysterectomy bilateral salpingo-oophorectomy. Patient reports good pain control with Ibuprofen and Oxycodone, denies nausea, tolerating regular diet. Voiding and ambulating without difficulty. Pt reports negative flatus, no BM. PIV in place, encouraged to use the IS, PCDs in place.    Update: Pt voided 250 ml of bloody urine, denies vaginal bleeding, no dizziness or any discomfort. Vitals stable. MD notified, order for UA and BMP. Pt is aware of the current plan.

## 2021-04-09 NOTE — PROGRESS NOTES
Gynecology Oncology Progress Note  04/10/21    POD#4 s/p Dx L/s, biopsies>XL, modified rad hyst, BSO, ureterolysis, bladder peritonectomy, omentectomy, partial hepatectomy, diaphragm stripping, liver/colon mobilization, bowel oversewing, resection of abdominal wall nodule    Disease: Low grade serous ovarian cancer    24 hour events:  - CT urogram without evidence of bladder or ureteral injury  - Transfused 1u pRBCs  - Suppository given, minimal stool    Subjective:   Patient feeling ok this morning. Just took oxycodone for pain. Had some nausea overnight, which resolved with zofran. No emesis. Passing gas consistently last night. Had only a small BM after suppository. Robin catheter in place. Has been ambulating, no longer dizzy. Denies fevers/chills, chest pain, shortness of breath.    Objective:   Patient Vitals for the past 24 hrs:   BP Temp Temp src Pulse Resp SpO2   04/09/21 1332 118/70 98.7  F (37.1  C) Oral 90 16 92 %   04/09/21 1224 115/73 98.2  F (36.8  C) Oral 84 16 --   04/09/21 1132 116/68 -- -- 81 16 --   04/09/21 1117 117/71 97.9  F (36.6  C) Oral 80 16 --   04/09/21 1104 123/75 98.2  F (36.8  C) Oral 78 16 97 %   04/09/21 0603 115/60 98.4  F (36.9  C) Oral 88 16 96 %   04/08/21 2200 112/57 -- -- 78 16 96 %   04/08/21 1701 110/62 98.1  F (36.7  C) Oral 77 16 97 %     General: NAD, appears comfortable in bed  CV: RRR  Resp: CTAB  Abdomen: Soft, appropriately tender, moderately distended and tympanitic, slightly worse than yesterday  Incision: C/D/I with steri strips in place  Extremities: Warm, well-perfused, nontender, trace edema, SCDs in place    Intake/Output  Last 24 hours//Since midnight  PO: 1160//-  U: 3250//450  Net: +1.9L    Lines/Drains:   PIV, Robin    New labs/imaging:  Results for orders placed or performed during the hospital encounter of 04/06/21 (from the past 24 hour(s))   Basic metabolic panel   Result Value Ref Range    Sodium 140 133 - 144 mmol/L    Potassium 3.7 3.4 - 5.3 mmol/L     Chloride 107 94 - 109 mmol/L    Carbon Dioxide 26 20 - 32 mmol/L    Anion Gap 6 3 - 14 mmol/L    Glucose 87 70 - 99 mg/dL    Urea Nitrogen 9 7 - 30 mg/dL    Creatinine 0.74 0.52 - 1.04 mg/dL    GFR Estimate >90 >60 mL/min/[1.73_m2]    GFR Estimate If Black >90 >60 mL/min/[1.73_m2]    Calcium 7.8 (L) 8.5 - 10.1 mg/dL   UA with Microscopic reflex to Culture    Specimen: Urine clean catch   Result Value Ref Range    Color Urine Orange     Appearance Urine Slightly Cloudy     Glucose Urine Negative NEG^Negative mg/dL    Bilirubin Urine Negative NEG^Negative    Ketones Urine 60 (A) NEG^Negative mg/dL    Specific Gravity Urine 1.011 1.003 - 1.035    Blood Urine Large (A) NEG^Negative    pH Urine 5.5 5.0 - 7.0 pH    Protein Albumin Urine 100 (A) NEG^Negative mg/dL    Urobilinogen mg/dL Normal 0.0 - 2.0 mg/dL    Nitrite Urine Negative NEG^Negative    Leukocyte Esterase Urine Small (A) NEG^Negative    Source Urine     WBC Urine 34 (H) 0 - 5 /HPF    RBC Urine >182 (H) 0 - 2 /HPF    Squamous Epithelial /HPF Urine 2 (H) 0 - 1 /HPF    Mucous Urine Present (A) NEG^Negative /LPF   Urine Culture Aerobic Bacterial    Specimen: Unspecified Urine   Result Value Ref Range    Specimen Description Unspecified Urine     Special Requests Specimen received in preservative     Culture Micro PENDING    Basic metabolic panel   Result Value Ref Range    Sodium 139 133 - 144 mmol/L    Potassium 3.7 3.4 - 5.3 mmol/L    Chloride 107 94 - 109 mmol/L    Carbon Dioxide 25 20 - 32 mmol/L    Anion Gap 7 3 - 14 mmol/L    Glucose 95 70 - 99 mg/dL    Urea Nitrogen 6 (L) 7 - 30 mg/dL    Creatinine 0.64 0.52 - 1.04 mg/dL    GFR Estimate >90 >60 mL/min/[1.73_m2]    GFR Estimate If Black >90 >60 mL/min/[1.73_m2]    Calcium 7.9 (L) 8.5 - 10.1 mg/dL   CBC with platelets   Result Value Ref Range    WBC 5.9 4.0 - 11.0 10e9/L    RBC Count 2.75 (L) 3.8 - 5.2 10e12/L    Hemoglobin 6.6 (LL) 11.7 - 15.7 g/dL    Hematocrit 22.2 (L) 35.0 - 47.0 %    MCV 81 78 - 100  fl    MCH 24.0 (L) 26.5 - 33.0 pg    MCHC 29.7 (L) 31.5 - 36.5 g/dL    RDW 17.6 (H) 10.0 - 15.0 %    Platelet Count 231 150 - 450 10e9/L   CT Urogram wo & w Contrast    Narrative    EXAMINATION: CT UROGRAM WO & W CONTRAST, 4/9/2021 10:38 AM    TECHNIQUE:  Helical CT images from the lung bases through the  symphysis pubis were obtained  without and with contrast using CT  urogram protocol. Contrast dose: Isovue 370    COMPARISON: MR abdomen 3/19/2021, CT abdomen pelvis 3/17/2021    HISTORY: None provided    Additional history obtained from EMR: 40-year-old female with a  history of metastatic ovarian cancer now status post exploratory  laparotomy, modified radical hysterectomy, ureterolysis, bladder  peritonectomy, omentectomy, partial left hepatectomy, diaphragm  stripping, liver/colon mobilization, bowel oversewing, resection of  abdominal wall nodule performed on 4/6/2021    FINDINGS:    Urinary tract: No filling defects are identified within either renal  collecting system. No extravasation of contrast. No hydronephrosis.  The urinary bladder is distended with a Robin catheter in place, but  otherwise unremarkable. No evidence of bladder or ureteric injury.  Small gas in the anterior aspect of the urinary bladder related to  catheterization.    Remainder of the abdomen and pelvis:   *  There are new postsurgical changes of hysterectomy, bilateral  salpingo-oophorectomy, and omentectomy and tumor debulking. Additional  postsurgical changes of partial left hepatectomy with a small gas and  fluid collection along the anterior aspect of the left hepatic lobe  measuring approximately 3.7 x 1.8 x 1.9 cm (series 8, image 99). Free  air in the abdomen and pelvis related to recent surgery.   *  Several hypodense lesions along the lateral hepatic surface  measuring up to 3.0 cm in segment 5 (series 8, image 98), and 1.6 cm  in hepatic segment 6 (series 8, image 118), not definitely seen on the  previous MR on 3/19/2021.    *  Redemonstration of multiple lesions in the liver, better  characterized on the comparison MRI on 3/19/2021 as benign, for  example a  2.5 x 2.2 cm lesion in hepatic segment IVb (series 8, image  123), 1.2 cm lesion in hepatic segment 8 (series 8, image 31).     Gallbladder is unremarkable. Mild intrahepatic biliary ductal  dilatation. Pancreas is unremarkable. The spleen, adrenal glands and  kidneys are unremarkable. 11 mm cyst at the midpole the right kidney.    Resection of previously seen right ovarian mass and mass in the left  lower pelvis. Large colonic stool burden, particularly in the cecum.  Mildly distended loops of small bowel throughout the abdomen, without  a discernible transition point, likely related to post operative  adynamic ileus. There is large volume ascites in the abdomen and  pelvis. The abdominal aorta is normal in caliber. The major abdominal  vasculature is patent.    Lung bases:  Small right pleural effusion with overlying atelectasis.    Bones and soft tissues: Stable sclerotic foci in the left ischial  tuberosity (series 8, image 460), left iliac bone, the T10 vertebral  body in the left aspect of the lower sternum (series 8, image 1).  Postsurgical changes of the abdominal all, with subcutaneous gas  extending along the right lateral mid and lower abdominal wall. Mild  diffuse body wall anasarca.      Impression    IMPRESSION: In this patient with a history of metastatic ovarian  cancer:  1. No evidence of ureteral or bladder injury.  2. New postsurgical changes of hysterectomy, bilateral  salpingo-oophorectomy, omentectomy, tumor bulking and partial left  hepatectomy. Small gas and fluid containing collection along the  resection site in the left hepatic lobe measuring up to 3.7 cm, which  is likely postoperative in nature although differential includes  abscess.  3. There are several hypodense lesions abutting the hepatic capsule  along segments 5 and 6, not seen on the  comparison MRI, and suspicious  for peritoneal metastatic implants.  4. Redemonstration of several hypodense lesions in the liver,  previously characterized as benign on MR on 3/19/2021.   5. Large volume ascites in the abdomen and pelvis.   6. Diffuse mild dilatation of small bowel throughout the abdomen,  likely related to postoperative adynamic ileus. Large colonic stool  burden.  6. Small right pleural effusion with overlying atelectasis.  7. Several sclerotic foci in the pelvis and spine are most likely  benign bone islands. Sclerotic focus in the inferior sternum is  indeterminate. Attention on follow-up.    I have personally reviewed the examination and initial interpretation  and I agree with the findings.    DARWIN RUBIN MD       Assessment:   40 year old POD#4 s/p Dx L/s, biopsies>XL, modified rad hyst, BSO, ureterolysis, bladder peritonectomy, omentectomy, partial hepatectomy, diaphragm stripping, liver/colon mobilization, bowel oversewing, resection of abdominal wall nodule. Optimally debulked to no gross residual disease. Gross hematuria noted POD#3 likely 2/2 bladder peritoneum stripping, CT urogram without evidence of bladder or ureteral injury; small bowel dilatation suggestive of postop ileus.     Dz: Pelvic mass, carcinomatosis. Low grade serous ovarian cancer.  FEN: Regular diet. Patient aware she must self-restrict if developing nausea/vomiting.   Pain: Scheduled Tylenol, Ibuprofen. Prn oxycodone, robaxin.  Heme: Acute blood loss anemia s/p 1 unit PRBC's, Hb pending this AM.  If stable, will plan to start lovenox.   CV: NI  Pulm: NI  GI: Slow return of bowel function, however now with flatus.  Will advance diet as tolerated  : Hematuria has resolved and CT urogram without evidence of injury.  Will remove bojorquez today  ID: NI  Endo: NI  Psych/Neuro/MSK/Other: NI  PPX: SCDs, will resume Lovenox today if Hgb stable  Dispo: When meeting postop goals  Drains/Lines: maryellen TSAI,  MD  OB/GYN Resident, PGY-3  4/10/2021 6:45 AM     I, Abdon Colmenares MD personally examined and evaluated this patient on 04/10/21.  I discussed the patient with the resident and care team, and agree with the assessment and plan of care as documented in the residents note above.    I personally reviewed vital signs, laboratory values and imaging results.    Pt with slow post-op course s/p debulking for newly diagnosed ovarian cancer.  Hematuria has resolved and will plan bojorquez removal today.  Hb stu appropriately last PM after 1unit PRBC's, await repeat today.  If stable, restart lovenox.    Ruba Colmenares MD  Gynecologic Oncology  [facility]

## 2021-04-09 NOTE — PROGRESS NOTES
"Brief Progress Note    In to discuss Hgb drop from 9.4 > 6.6 and recommendation for blood transfusion. Patient reports that when she was up ambulating yesterday did have some mild lightheadedness. Also having occasional SOB and chest palpitations. Denies any significant worsening in abdominal pain, nausea, or vomiting. Had bojorquez catheter replaced overnight due to gross hematuria.     /60 (BP Location: Right arm)   Pulse 88   Temp 98.4  F (36.9  C) (Oral)   Resp 16   Ht 1.575 m (5' 2\")   Wt 50.7 kg (111 lb 12.8 oz)   LMP 03/11/2021   SpO2 96%   BMI 20.45 kg/m     Gen: NAD, pale   CV: Warm and well perfused, RRR, no murmurs, rubs, gallops  Pulm: Breathing comfortably on room air, CTAB   Abd: Moderately distended, appropriately tender, R>L, mild rebound tenderness in LLQ, normal bowel sounds  Extremities: Trace edema.   Bojorquez cath with clearing urine, no obvious clots or hematuria.     WBC 5.9  Hgb 6.6  Plt 231    Na 139  K 3.7  Cl 07  Cr 0.64     UA small LE, neg nitrites, >182 RBCs    40 year old POD#3 s/p Dx L/s, biopsies > XL, modified radical hyst, BSO, ureterolysis, bladder peritonectomy, omentectomy, partial hepatectomy, diaphragm stripping, liver/colon mobilization, bowel oversewing, resection of abdominal wall nodule with new gross hematuria overnight and evidence of acute blood loss anemia with drop in Hgb from 9.4>6.6.     - 1U pRBCs ordered, discussed with patient who is agreeable. T&S from 4/1 still valid. S/p blood transfusion consent prior to surgery, currently in patient chart  - Repeat Hgb 4 hours after transfusion   - CT urogram ordered to evaluate for bladder or ureteral injury   - Continue close VS and symptom monitoring, consider additional abdominal imaging prn concerns      Kathy Cobb MD PGY-1   [facility] Gynecology Oncology   Gyn Onc Pager: [phone]  04/09/21 8:40 AM       "

## 2021-04-09 NOTE — PLAN OF CARE
VSS. Neuros intact.  LS clear-IS done.  Abd is distended, +BS, passing flatus, no BM. Suppository given-no results yet.  Robin with good UOP, urine had few mucusy bloody streaks but improving.  Fair appetite, on regular diet-eats food brought by .   SBA to bathroom/room/hallways.   [place] dizzy prior to blood transfusion but got better after blood transfusion.  No adverse reaction noted with transfusion-Hgb recheck is at 8.5.  Pain in abd/back controlled with prn oxycodone and sched tylenol and ibuprofen, has robaxin available.  Abd incision with intact steri-strips, no drainage noted-abd binder in place.  Left PIV sl'd.  Continue to monitor VS, pain, ROBF and gen status and continue with POC.

## 2021-04-10 LAB
ANION GAP SERPL CALCULATED.3IONS-SCNC: 8 MMOL/L (ref 3–14)
BACTERIA SPEC CULT: NO GROWTH
BUN SERPL-MCNC: 4 MG/DL (ref 7–30)
CALCIUM SERPL-MCNC: 8.5 MG/DL (ref 8.5–10.1)
CHLORIDE SERPL-SCNC: 106 MMOL/L (ref 94–109)
CO2 SERPL-SCNC: 26 MMOL/L (ref 20–32)
CREAT SERPL-MCNC: 0.65 MG/DL (ref 0.52–1.04)
ERYTHROCYTE [DISTWIDTH] IN BLOOD BY AUTOMATED COUNT: 17.4 % (ref 10–15)
GFR SERPL CREATININE-BSD FRML MDRD: >90 ML/MIN/{1.73_M2}
GLUCOSE SERPL-MCNC: 87 MG/DL (ref 70–99)
HCT VFR BLD AUTO: 26.3 % (ref 35–47)
HGB BLD-MCNC: 8.2 G/DL (ref 11.7–15.7)
Lab: NORMAL
MCH RBC QN AUTO: 24.6 PG (ref 26.5–33)
MCHC RBC AUTO-ENTMCNC: 31.2 G/DL (ref 31.5–36.5)
MCV RBC AUTO: 79 FL (ref 78–100)
PLATELET # BLD AUTO: 270 10E9/L (ref 150–450)
POTASSIUM SERPL-SCNC: 3.8 MMOL/L (ref 3.4–5.3)
RBC # BLD AUTO: 3.34 10E12/L (ref 3.8–5.2)
SODIUM SERPL-SCNC: 140 MMOL/L (ref 133–144)
SPECIMEN SOURCE: NORMAL
WBC # BLD AUTO: 6.2 10E9/L (ref 4–11)

## 2021-04-10 PROCEDURE — 250N000013 HC RX MED GY IP 250 OP 250 PS 637: Performed by: STUDENT IN AN ORGANIZED HEALTH CARE EDUCATION/TRAINING PROGRAM

## 2021-04-10 PROCEDURE — 36415 COLL VENOUS BLD VENIPUNCTURE: CPT | Performed by: STUDENT IN AN ORGANIZED HEALTH CARE EDUCATION/TRAINING PROGRAM

## 2021-04-10 PROCEDURE — 250N000011 HC RX IP 250 OP 636: Performed by: NURSE PRACTITIONER

## 2021-04-10 PROCEDURE — 120N000002 HC R&B MED SURG/OB UMMC

## 2021-04-10 PROCEDURE — 250N000013 HC RX MED GY IP 250 OP 250 PS 637: Performed by: NURSE PRACTITIONER

## 2021-04-10 PROCEDURE — 80048 BASIC METABOLIC PNL TOTAL CA: CPT | Performed by: STUDENT IN AN ORGANIZED HEALTH CARE EDUCATION/TRAINING PROGRAM

## 2021-04-10 PROCEDURE — 99024 POSTOP FOLLOW-UP VISIT: CPT | Performed by: OBSTETRICS & GYNECOLOGY

## 2021-04-10 PROCEDURE — 250N000011 HC RX IP 250 OP 636: Performed by: STUDENT IN AN ORGANIZED HEALTH CARE EDUCATION/TRAINING PROGRAM

## 2021-04-10 PROCEDURE — 85027 COMPLETE CBC AUTOMATED: CPT | Performed by: STUDENT IN AN ORGANIZED HEALTH CARE EDUCATION/TRAINING PROGRAM

## 2021-04-10 RX ORDER — ONDANSETRON 4 MG/1
4 TABLET, ORALLY DISINTEGRATING ORAL EVERY 8 HOURS PRN
Status: DISCONTINUED | OUTPATIENT
Start: 2021-04-10 | End: 2021-04-11 | Stop reason: HOSPADM

## 2021-04-10 RX ORDER — OXYCODONE HYDROCHLORIDE 5 MG/1
5 TABLET ORAL EVERY 4 HOURS PRN
Status: DISCONTINUED | OUTPATIENT
Start: 2021-04-10 | End: 2021-04-10

## 2021-04-10 RX ORDER — AMOXICILLIN 250 MG
2 CAPSULE ORAL 2 TIMES DAILY PRN
Qty: 60 TABLET | Refills: 0 | Status: SHIPPED | OUTPATIENT
Start: 2021-04-10 | End: 2021-04-14

## 2021-04-10 RX ORDER — ACETAMINOPHEN 325 MG/1
650 TABLET ORAL EVERY 6 HOURS
Qty: 60 TABLET | Refills: 0 | Status: SHIPPED | OUTPATIENT
Start: 2021-04-10

## 2021-04-10 RX ORDER — IBUPROFEN 600 MG/1
600 TABLET, FILM COATED ORAL EVERY 6 HOURS PRN
Qty: 60 TABLET | Refills: 0 | Status: SHIPPED | OUTPATIENT
Start: 2021-04-10 | End: 2021-06-08

## 2021-04-10 RX ORDER — OXYCODONE HYDROCHLORIDE 5 MG/1
5-10 TABLET ORAL EVERY 4 HOURS PRN
Status: DISCONTINUED | OUTPATIENT
Start: 2021-04-10 | End: 2021-04-11 | Stop reason: HOSPADM

## 2021-04-10 RX ORDER — POLYETHYLENE GLYCOL 3350 17 G/17G
1 POWDER, FOR SOLUTION ORAL 2 TIMES DAILY PRN
Qty: 10 PACKET | Refills: 1 | Status: SHIPPED | OUTPATIENT
Start: 2021-04-10 | End: 2021-06-08

## 2021-04-10 RX ADMIN — OXYCODONE HYDROCHLORIDE 10 MG: 5 TABLET ORAL at 10:32

## 2021-04-10 RX ADMIN — IBUPROFEN 600 MG: 600 TABLET ORAL at 08:36

## 2021-04-10 RX ADMIN — ACETAMINOPHEN 650 MG: 325 TABLET, FILM COATED ORAL at 08:36

## 2021-04-10 RX ADMIN — IBUPROFEN 600 MG: 600 TABLET ORAL at 15:04

## 2021-04-10 RX ADMIN — ACETAMINOPHEN 650 MG: 325 TABLET, FILM COATED ORAL at 21:40

## 2021-04-10 RX ADMIN — POLYETHYLENE GLYCOL 3350 17 G: 17 POWDER, FOR SOLUTION ORAL at 08:37

## 2021-04-10 RX ADMIN — OXYCODONE HYDROCHLORIDE 10 MG: 5 TABLET ORAL at 21:40

## 2021-04-10 RX ADMIN — OXYCODONE HYDROCHLORIDE 5 MG: 5 TABLET ORAL at 16:24

## 2021-04-10 RX ADMIN — ONDANSETRON 4 MG: 4 TABLET, ORALLY DISINTEGRATING ORAL at 03:22

## 2021-04-10 RX ADMIN — ENOXAPARIN SODIUM 40 MG: 100 INJECTION SUBCUTANEOUS at 14:59

## 2021-04-10 RX ADMIN — IBUPROFEN 600 MG: 600 TABLET ORAL at 03:11

## 2021-04-10 RX ADMIN — DOCUSATE SODIUM 50 MG AND SENNOSIDES 8.6 MG 2 TABLET: 8.6; 5 TABLET, FILM COATED ORAL at 08:37

## 2021-04-10 RX ADMIN — ACETAMINOPHEN 650 MG: 325 TABLET, FILM COATED ORAL at 03:12

## 2021-04-10 RX ADMIN — OXYCODONE HYDROCHLORIDE 10 MG: 5 TABLET ORAL at 05:47

## 2021-04-10 RX ADMIN — IBUPROFEN 600 MG: 600 TABLET ORAL at 21:39

## 2021-04-10 RX ADMIN — ACETAMINOPHEN 650 MG: 325 TABLET, FILM COATED ORAL at 15:04

## 2021-04-10 RX ADMIN — OXYCODONE HYDROCHLORIDE 5 MG: 5 TABLET ORAL at 20:17

## 2021-04-10 RX ADMIN — METHOCARBAMOL 500 MG: 500 TABLET, FILM COATED ORAL at 19:06

## 2021-04-10 RX ADMIN — METHOCARBAMOL 500 MG: 500 TABLET, FILM COATED ORAL at 03:11

## 2021-04-10 ASSESSMENT — ACTIVITIES OF DAILY LIVING (ADL)
ADLS_ACUITY_SCORE: 18

## 2021-04-10 ASSESSMENT — MIFFLIN-ST. JEOR: SCORE: 1118.25

## 2021-04-10 NOTE — PROGRESS NOTES
Gynecology Oncology Progress Note  04/11/21    POD#5 s/p Dx L/s, biopsies>XL, modified rad hyst, BSO, ureterolysis, bladder peritonectomy, omentectomy, partial hepatectomy, diaphragm stripping, liver/colon mobilization, bowel oversewing, resection of abdominal wall nodule    Disease: Low grade serous ovarian cancer    24 hour events:  - s/p bojorquez, voiding spontaneously  - had several BM yesterday, passing gas    Subjective:   Patient well, pain well controlled after increasing oxycodone back to 5-10mg from 5mg. Voiding spontaneously, continuing to pass gas. Had 1-4 loose BMs yesterday. Has been ambulating. Denies fevers/chills, dizziness, chest pain, n/v shortness of breath.    Objective:   Patient Vitals for the past 24 hrs:   BP Temp Temp src Pulse Resp SpO2 Weight   04/11/21 0620 130/80 97.6  F (36.4  C) Oral 71 16 98 % --   04/10/21 2336 132/85 98.6  F (37  C) Oral 78 16 94 % --   04/10/21 1726 -- -- -- -- -- -- 50.5 kg (111 lb 5.3 oz)   04/10/21 1536 122/79 98.1  F (36.7  C) Oral 79 17 95 % --     General: NAD, appears comfortable in bed  CV: RRR  Resp: CTAB  Abdomen: Soft, appropriately tender, mildly distended and tympanitic, improving  Incision: non-erythematous, intact, no drainage with steri strips in place  Extremities: Warm, well-perfused, nontender, trace edema, SCDs in place    I/O last 3 completed shifts:  In: 1320 [P.O.:1320]  Out: 2900 [Urine:2900]      New labs/imaging:  Results for orders placed or performed during the hospital encounter of 04/06/21 (from the past 24 hour(s))   CBC with platelets   Result Value Ref Range    WBC 6.2 4.0 - 11.0 10e9/L    RBC Count 3.34 (L) 3.8 - 5.2 10e12/L    Hemoglobin 8.2 (L) 11.7 - 15.7 g/dL    Hematocrit 26.3 (L) 35.0 - 47.0 %    MCV 79 78 - 100 fl    MCH 24.6 (L) 26.5 - 33.0 pg    MCHC 31.2 (L) 31.5 - 36.5 g/dL    RDW 17.4 (H) 10.0 - 15.0 %    Platelet Count 270 150 - 450 10e9/L   Basic metabolic panel   Result Value Ref Range    Sodium 140 133 - 144 mmol/L     Potassium 3.8 3.4 - 5.3 mmol/L    Chloride 106 94 - 109 mmol/L    Carbon Dioxide 26 20 - 32 mmol/L    Anion Gap 8 3 - 14 mmol/L    Glucose 87 70 - 99 mg/dL    Urea Nitrogen 4 (L) 7 - 30 mg/dL    Creatinine 0.65 0.52 - 1.04 mg/dL    GFR Estimate >90 >60 mL/min/[1.73_m2]    GFR Estimate If Black >90 >60 mL/min/[1.73_m2]    Calcium 8.5 8.5 - 10.1 mg/dL       Assessment:   40 year old POD#5 s/p Dx L/s, biopsies>XL, modified rad hyst, BSO, ureterolysis, bladder peritonectomy, omentectomy, partial hepatectomy, diaphragm stripping, liver/colon mobilization, bowel oversewing, resection of abdominal wall nodule. Optimally debulked to no gross residual disease. Gross hematuria noted POD#3 likely 2/2 bladder peritoneum stripping now resolved. Post-op ileus also resolved. Meeting all post-op goals.    Dz: Pelvic mass, carcinomatosis. Low grade serous ovarian cancer.  FEN: Regular diet.  Pain: Scheduled Tylenol, Ibuprofen. Prn oxycodone, robaxin.  Heme: Acute blood loss anemia s/p 1 unit PRBC's, Hb stable and no symptoms of anemia  CV: NI  Pulm: NI  GI: Slow return of bowel function, however now with flatus and bowel movements. Tolerating a regular diet  : Hematuria has resolved and CT urogram without evidence of injury. S/p bojorquez, voiding spontaneously.  ID: NI  Endo: NI  Psych/Neuro/MSK/Other: NI  PPX: SCDs, lovenox started yesterday for prophylaxis. Will discharge for a total of 28 days  Dispo: discharge today  Drains/Lines: QUIN Marie MD  OB/GYN PGY-2  04/11/21 6:58 AM

## 2021-04-10 NOTE — PLAN OF CARE
VSS. Neuros intact.  LS clear-IS done.  Abd is distended, +BS, loose BMs-MD aware  good UOP  Fair appetite, on regular diet-eats food brought by .   SBA to bathroom/room/hallways.   Pain in abd controlled with prn oxycodone and sched tylenol and ibuprofen and robaxin available.  Abd incision with intact steri-strips, no drainage noted-abd binder in place.  Left PIV sl'd.  Continue to monitor VS, pain, and gen status and continue with POC.

## 2021-04-10 NOTE — PROGRESS NOTES
VSS. Patient alert and oriented x4. Abdomen distended, passing flatus, no BM. Abdominal incision clean, dry and intact with steri strips. Abdominal pain and cramping. Gave warm packs, removed abdominal binder while using. Pain managed with PRN oxycodone, scheduled tylenol and ibuprofen. Hgb at 8.2.SBA to bathroom/room/hallways. Left PIV sl'd. Continue to monitor for pain, nausea, and ROBF.

## 2021-04-10 NOTE — PLAN OF CARE
Assumed care of Patient from [id number]. AVSS. Pain managed with ibuprofen, tylenol, oxycodone and robaxin.Pt abdomen distended, passing gas, no BM this shift. Nausea managed with PRN Zofran. Abdominal incisions clean dry and intact with steri strips. Tolerating regular diet. Robin patent and draining adequate output. Up in room w/SBA. Using IS. Hgb recheck this am. Continue to monitor for ROBF, pain and nausea.

## 2021-04-11 VITALS
RESPIRATION RATE: 16 BRPM | DIASTOLIC BLOOD PRESSURE: 80 MMHG | WEIGHT: 111.33 LBS | TEMPERATURE: 97.6 F | HEIGHT: 62 IN | SYSTOLIC BLOOD PRESSURE: 130 MMHG | HEART RATE: 71 BPM | OXYGEN SATURATION: 98 % | BODY MASS INDEX: 20.49 KG/M2

## 2021-04-11 PROCEDURE — 250N000013 HC RX MED GY IP 250 OP 250 PS 637: Performed by: NURSE PRACTITIONER

## 2021-04-11 PROCEDURE — 250N000013 HC RX MED GY IP 250 OP 250 PS 637: Performed by: STUDENT IN AN ORGANIZED HEALTH CARE EDUCATION/TRAINING PROGRAM

## 2021-04-11 RX ORDER — OXYCODONE HYDROCHLORIDE 5 MG/1
10 TABLET ORAL EVERY 4 HOURS PRN
Qty: 36 TABLET | Refills: 0 | Status: SHIPPED | OUTPATIENT
Start: 2021-04-11 | End: 2021-04-14

## 2021-04-11 RX ADMIN — OXYCODONE HYDROCHLORIDE 10 MG: 5 TABLET ORAL at 01:44

## 2021-04-11 RX ADMIN — IBUPROFEN 600 MG: 600 TABLET ORAL at 09:08

## 2021-04-11 RX ADMIN — ACETAMINOPHEN 650 MG: 325 TABLET, FILM COATED ORAL at 09:07

## 2021-04-11 RX ADMIN — OXYCODONE HYDROCHLORIDE 10 MG: 5 TABLET ORAL at 09:58

## 2021-04-11 RX ADMIN — ACETAMINOPHEN 650 MG: 325 TABLET, FILM COATED ORAL at 03:07

## 2021-04-11 RX ADMIN — IBUPROFEN 600 MG: 600 TABLET ORAL at 03:07

## 2021-04-11 RX ADMIN — OXYCODONE HYDROCHLORIDE 10 MG: 5 TABLET ORAL at 05:50

## 2021-04-11 ASSESSMENT — ACTIVITIES OF DAILY LIVING (ADL)
ADLS_ACUITY_SCORE: 18

## 2021-04-11 NOTE — PROGRESS NOTES
Assumed care of Patient from [id number]. AVSS. Pain managed with tylenol, ibuprofen and oxycodone. Pt denies nausea. Pt passing gas and had two loose stools earlier today, refused senna. Abdominal incisions clean, dry and intact with steri strips, no drainage, abdominal binder in place. Pt voiding with adequate output. Up in room w/SBA. Using IS. Continue to monitor.

## 2021-04-11 NOTE — PLAN OF CARE
VSS. Abd incision clean, dry, intact with steri strips, binder in place. Pain controlled with scheduled tylenol, ibuprofen and PRN Oxycodone. Tolerating diet. Independently performs hygiene. Passing flatus/BM. Voiding adeq amount spontaneously. Discharge instructions and follow up appts reviewed with patient and spouse.

## 2021-04-12 ENCOUNTER — TELEPHONE (OUTPATIENT)
Dept: FAMILY MEDICINE | Facility: CLINIC | Age: 41
End: 2021-04-12

## 2021-04-12 NOTE — TELEPHONE ENCOUNTER
ED / Discharge Outreach Protocol    Patient Contact    Attempt # 1    Was call answered?  No.  Unable to leave message.

## 2021-04-12 NOTE — TELEPHONE ENCOUNTER
Reason for follow up call: Kayy Salazar appeared on our list for being seen in and recenlty discharge from the Emergency Room/In Patient Hospital Admission.    Chief Complaint   Patient presents with     Hospital F/U     Mass Of Left Ovary, Mass Of Left Ovary       Encounter routed for Clinic Triage RN to call for follow up    Cj Ferguson, LEWN, RN, PHN  [facility] ~ Lead Registered Nurse  Clinic Triage ~ [facility]  April 12, 2021

## 2021-04-13 LAB — COPATH REPORT: NORMAL

## 2021-04-13 NOTE — TELEPHONE ENCOUNTER
ED / Discharge Outreach Protocol    Patient Contact    Attempt # 2    Was call answered?  No.  Unable to leave message. unidentified voice mail.    Jade Ambriz RN on 4/13/2021 at 8:38 AM

## 2021-04-14 DIAGNOSIS — Z98.890 POSTOPERATIVE STATE: ICD-10-CM

## 2021-04-14 RX ORDER — AMOXICILLIN 250 MG
2 CAPSULE ORAL 2 TIMES DAILY PRN
Qty: 60 TABLET | Refills: 0 | Status: SHIPPED | OUTPATIENT
Start: 2021-04-14 | End: 2021-06-07

## 2021-04-14 RX ORDER — OXYCODONE HYDROCHLORIDE 5 MG/1
10 TABLET ORAL EVERY 4 HOURS PRN
Qty: 36 TABLET | Refills: 0 | Status: SHIPPED | OUTPATIENT
Start: 2021-04-14 | End: 2021-05-03

## 2021-04-14 NOTE — TELEPHONE ENCOUNTER
"  Hospital/TCU/ED for chronic condition Discharge Protocol    \"Hi, my name is Uma Walter, a registered nurse, and I am calling from [facility].  I am calling to follow up and see how things are going for you after your recent emergency visit/hospital/TCU stay.\"    Tell me how you are doing now that you are home?\" \" I am doing ok, can't eat much.\" Still having some pain. Wrote to nurse and she will be prescribing her additional pain meds.       Discharge Instructions    \"Let's review your discharge instructions.  What is/are the follow-up recommendations?  Pt. Response: Follow-up with surgeon    \"Has an appointment with your primary care provider been scheduled?\"   Yes. (confirm)   Confirmed appointment is with surgeon and not PCP. Patient states she will wait on this and call if she decides that is needed.     \"When you see the provider, I would recommend that you bring your medications with you.\"    Medications    \"Tell me what changed about your medicines when you discharged?\"    Changes to chronic meds?    0-1    \"What questions do you have about your medications?\"    None     New diagnoses of heart failure, COPD, diabetes, or MI?    No            On warfarin: \"Were you given any recommendations for follow-up with the anticoagulation clinic?\" Patient on Lovenox (not Warfarin)    Post Discharge Medication Reconciliation Status: discharge medications reconciled, continue medications without change.    Was MTM referral placed (*Make sure to put transitions as reason for referral)?   No    Call Summary    \"What questions or concerns do you have about your recent visit and your follow-up care?\"     none other then patient states she is having constipation that was not there prior to surgery. This nurse informed her this is most likely being caused by the pain meds. Upon looking at med list it appears patient was prescribed stool softner and was unaware. Patient will pick this up and start taking " "to help with constipation.     \"If you have questions or things don't continue to improve, we encourage you contact us through the main clinic number (give number).  Even if the clinic is not open, triage nurses are available 24/7 to help you.     We would like you to know that our clinic has extended hours (provide information).  We also have urgent care (provide details on closest location and hours/contact info)\"      \"Thank you for your time and take care!\"         MARINO Kline/[facility]      "

## 2021-04-21 ENCOUNTER — PATIENT OUTREACH (OUTPATIENT)
Dept: ONCOLOGY | Facility: CLINIC | Age: 41
End: 2021-04-21

## 2021-04-21 ENCOUNTER — VIRTUAL VISIT (OUTPATIENT)
Dept: ONCOLOGY | Facility: CLINIC | Age: 41
End: 2021-04-21
Attending: OBSTETRICS & GYNECOLOGY
Payer: COMMERCIAL

## 2021-04-21 DIAGNOSIS — C56.9 OVARIAN CANCER, UNSPECIFIED LATERALITY (H): ICD-10-CM

## 2021-04-21 PROCEDURE — 999N001193 HC VIDEO/TELEPHONE VISIT; NO CHARGE

## 2021-04-21 PROCEDURE — 99215 OFFICE O/P EST HI 40 MIN: CPT | Mod: 24 | Performed by: OBSTETRICS & GYNECOLOGY

## 2021-04-21 NOTE — PROGRESS NOTES
Kayy is a 40 year old who is being evaluated via a billable video visit.      How would you like to obtain your AVS? MyChart  If the video visit is dropped, the invitation should be resent by: Text to cell phone: [phone]  Will anyone else be joining your video visit? No        Video-Visit Details    40 minutes                Follow Up Notes on Referred Patient    Date: 2021       Dr. 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 She has been recovering well from surgery. No nausea, vomiting, fever or chills. Normal urinary and bowel function. She still does have some vaginal spotting. No B-symtoms.     Oncologic history:    21:  Diagnostic laparoscopy with biopsies, converted to exploratory laparotomy, modified radical hysterectomy, bilateral salpingo-oophorectomy, bilateral ureterolysis, bladder peritonectomy, omentectomy, diaphragm stripping, liver mobilization, colon mobilization, small bowel serosa oversewing, resection of abdominal wall nodule  Partial hepatectomy performed by Surgical Oncology (Dr. Zuñiga)   Optimal debulking to no residual macroscopic residual disease R0      Past Medical History:    Past Medical History:   Diagnosis Date     Endometriosis          Past Surgical History:    Past Surgical History:   Procedure Laterality Date     GYN SURGERY      c section      HC TOOTH EXTRACTION W/FORCEP       HEPATECTOMY PARTIAL  2021    Procedure: Partial left hepatectomy level 3;  Surgeon: Chris Zuñiga MD;  Location: [facility] OR     LAPAROSCOPIC ABLATION ENDOMETRIOSIS           LAPAROSCOPY DIAGNOSTIC (GYN) N/A 2021    Procedure: Diagnostic laparoscopy with biopsies converted to exploratory laparotmy, modified radical hysterectomy bilateral salpingo-oophorectomy, bilateral ureterolysis, peritonectomy, omentectomy,  resection of liver nodule, diapraghm stripping, liver mobilization, colon mobilization, resection of abdominal wall nodule, tumor debulking 2R0, no macroscopic residual disease;  Surgeon: Ben Ch         Health Maintenance Due   Topic Date Due     PREVENTIVE CARE VISIT  Never done     ADVANCE CARE PLANNING  Never done     Pneumococcal Vaccine: Pediatrics (0 to 5 Years) and At-Risk Patients (6 to 64 Years) (1 of 4 - PCV13) Never done     HIV SCREENING  Never done     HEPATITIS C SCREENING  Never done     DTAP/TDAP/TD IMMUNIZATION (1 - Tdap) Never done     PAP  03/01/2016     INFLUENZA VACCINE (1) Never done       Current Medications:     Current Outpatient Medications   Medication Sig Dispense Refill     acetaminophen (TYLENOL) 325 MG tablet Take 2 tablets (650 mg) by mouth every 6 hours 60 tablet 0     enoxaparin ANTICOAGULANT (LOVENOX) 40 MG/0.4ML syringe Inject 0.4 mLs (40 mg) Subcutaneous every 24 hours for 23 days 9.2 mL 0     ibuprofen (ADVIL/MOTRIN) 600 MG tablet Take 1 tablet (600 mg) by mouth every 6 hours as needed for moderate pain 60 tablet 0     oxyCODONE (ROXICODONE) 5 MG tablet Take 2 tablets (10 mg) by mouth every 4 hours as needed for pain 36 tablet 0     polyethylene glycol (MIRALAX) 17 g packet Take 17 g by mouth 2 times daily as needed for constipation 10 packet 1     senna-docusate (SENOKOT-S/PERICOLACE) 8.6-50 MG tablet Take 2 tablets by mouth 2 times daily as needed for constipation 60 tablet 0         Allergies:        Allergies   Allergen Reactions     Amoxicillin Hives     Full body hives after amoxacillin & Vicodin after wisdom teeth pulled when around 27 yo.     Vicodin [Hydrocodone-Acetaminophen] Rash        Social History:     Social History     Tobacco Use     Smoking status: Never Smoker     Smokeless tobacco: Never Used   Substance Use Topics     Alcohol use: No       History   Drug Use No         Family History:       Family History   Problem Relation Age of Onset     Blood  Disease Mother         hemachromatosis      Diabetes Maternal Grandmother      Alzheimer Disease Maternal Grandmother      Multiple Sclerosis Maternal Grandmother      Pancreatic Cancer Paternal Grandmother      Leukemia Paternal Grandfather      Anesthesia Reaction No family hx of      Deep Vein Thrombosis (DVT) No family hx of          Physical Exam:     There were no vitals taken for this visit.  There is no height or weight on file to calculate BMI.    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     A total of 40 minutes was spent with the patient, 30 minutes of which were spent in counseling the patient and/or treatment planning.      1. Stage IV low grade serous ovarian cancer  2. Optimal cytoreduction to R0  3. PM Program    We did discuss the pathology report in detail given the subcutaneous nodule she is technically stage IV. I would recommend adjuvant treatment with carboplatin AUC of 6 and paclitaxel of 175mg/m2 q 21 days for 6 cycles followed by letrozole maintenance therapy. Side effects and risks and benefits were discussed. We will plan on Caris testing and genetic counseling.    All questions were answered.  We will also have her consented for the Precision Medicine Program and [facility]atar Program.     vIana Sandoval MD, MS    Department of Obstetrics and Gynecology   Division of Gynecologic Oncology   [facility]  Phone: [phone]    CC  Patient Care Team:  [facility] as PCP - General (Clinic)  Karol Hairston MD as Assigned PCP  Ivana Sandoval MD as MD (Gynecologic Oncology)  Karol Hairston MD as Referring Physician (Family Medicine)  Ivana Sandoval MD as Assigned Cancer Care Provider  IVANA SANDOVAL

## 2021-04-21 NOTE — LETTER
2021         RE: Kayy Salazar  [address] [facility] [address]        Dear Colleague,    Thank you for referring your patient, Kayy Salazar, to the [facility]. Please see a copy of my visit note below.    Kayy is a 40 year old who is being evaluated via a billable video visit.      How would you like to obtain your AVS? MyChart  If the video visit is dropped, the invitation should be resent by: Text to cell phone: [phone]  Will anyone else be joining your video visit? No        Video-Visit Details    40 minutes                Follow Up Notes on Referred Patient    Date: 2021       Dr. 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 She has been recovering well from surgery. No nausea, vomiting, fever or chills. Normal urinary and bowel function. She still does have some vaginal spotting. No B-symtoms.     Oncologic history:    21:  Diagnostic laparoscopy with biopsies, converted to exploratory laparotomy, modified radical hysterectomy, bilateral salpingo-oophorectomy, bilateral ureterolysis, bladder peritonectomy, omentectomy, diaphragm stripping, liver mobilization, colon mobilization, small bowel serosa oversewing, resection of abdominal wall nodule  Partial hepatectomy performed by Surgical Oncology (Dr. Zuñiga)   Optimal debulking to no residual macroscopic residual disease R0      Past Medical History:    Past Medical History:   Diagnosis Date     Endometriosis          Past Surgical History:    Past Surgical History:   Procedure Laterality Date     GYN SURGERY      c section      HC TOOTH EXTRACTION W/FORCEP       HEPATECTOMY PARTIAL  2021    Procedure: Partial left hepatectomy level 3;  Surgeon: Chris Zuñiga MD;  Location: [facility] OR     LAPAROSCOPIC ABLATION ENDOMETRIOSIS            LAPAROSCOPY DIAGNOSTIC (GYN) N/A 4/6/2021    Procedure: Diagnostic laparoscopy with biopsies converted to exploratory laparotmy, modified radical hysterectomy bilateral salpingo-oophorectomy, bilateral ureterolysis, peritonectomy, omentectomy, resection of liver nodule, diapraghm stripping, liver mobilization, colon mobilization, resection of abdominal wall nodule, tumor debulking 2R0, no macroscopic residual disease;  Surgeon: Ben Ch         Health Maintenance Due   Topic Date Due     PREVENTIVE CARE VISIT  Never done     ADVANCE CARE PLANNING  Never done     Pneumococcal Vaccine: Pediatrics (0 to 5 Years) and At-Risk Patients (6 to 64 Years) (1 of 4 - PCV13) Never done     HIV SCREENING  Never done     HEPATITIS C SCREENING  Never done     DTAP/TDAP/TD IMMUNIZATION (1 - Tdap) Never done     PAP  03/01/2016     INFLUENZA VACCINE (1) Never done       Current Medications:     Current Outpatient Medications   Medication Sig Dispense Refill     acetaminophen (TYLENOL) 325 MG tablet Take 2 tablets (650 mg) by mouth every 6 hours 60 tablet 0     enoxaparin ANTICOAGULANT (LOVENOX) 40 MG/0.4ML syringe Inject 0.4 mLs (40 mg) Subcutaneous every 24 hours for 23 days 9.2 mL 0     ibuprofen (ADVIL/MOTRIN) 600 MG tablet Take 1 tablet (600 mg) by mouth every 6 hours as needed for moderate pain 60 tablet 0     oxyCODONE (ROXICODONE) 5 MG tablet Take 2 tablets (10 mg) by mouth every 4 hours as needed for pain 36 tablet 0     polyethylene glycol (MIRALAX) 17 g packet Take 17 g by mouth 2 times daily as needed for constipation 10 packet 1     senna-docusate (SENOKOT-S/PERICOLACE) 8.6-50 MG tablet Take 2 tablets by mouth 2 times daily as needed for constipation 60 tablet 0         Allergies:        Allergies   Allergen Reactions     Amoxicillin Hives     Full body hives after amoxacillin & Vicodin after wisdom teeth pulled when around 27 yo.     Vicodin [Hydrocodone-Acetaminophen] Rash        Social History:     Social History      Tobacco Use     Smoking status: Never Smoker     Smokeless tobacco: Never Used   Substance Use Topics     Alcohol use: No       History   Drug Use No         Family History:       Family History   Problem Relation Age of Onset     Blood Disease Mother         hemachromatosis      Diabetes Maternal Grandmother      Alzheimer Disease Maternal Grandmother      Multiple Sclerosis Maternal Grandmother      Pancreatic Cancer Paternal Grandmother      Leukemia Paternal Grandfather      Anesthesia Reaction No family hx of      Deep Vein Thrombosis (DVT) No family hx of          Physical Exam:     There were no vitals taken for this visit.  There is no height or weight on file to calculate BMI.    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     A total of 40 minutes was spent with the patient, 30 minutes of which were spent in counseling the patient and/or treatment planning.      1. Stage IV low grade serous ovarian cancer  2. Optimal cytoreduction to R0  3. PM Program    We did discuss the pathology report in detail given the subcutaneous nodule she is technically stage IV. I would recommend adjuvant treatment with carboplatin AUC of 6 and paclitaxel of 175mg/m2 q 21 days for 6 cycles followed by letrozole maintenance therapy. Side effects and risks and benefits were discussed. We will plan on Caris testing and genetic counseling.    All questions were answered.  We will also have her consented for the Precision Medicine Program and [facility]atar Program.     Luis Ch MD, MS    Department of Obstetrics and Gynecology   Division of Gynecologic Oncology   [facility]  Phone: [phone]    CC  Patient Care Team:  [facility] as PCP - General (Clinic)  Karol Hairston  MD as Assigned PCP  Luis Ch MD as MD (Gynecologic Oncology)  Karol Hairston MD as Referring Physician (Family Medicine)

## 2021-04-22 ENCOUNTER — TELEPHONE (OUTPATIENT)
Dept: RADIATION THERAPY | Facility: OUTPATIENT CENTER | Age: 41
End: 2021-04-22

## 2021-04-22 DIAGNOSIS — Z11.59 SCREENING FOR VIRAL DISEASE: Primary | ICD-10-CM

## 2021-04-22 DIAGNOSIS — C56.9 OVARIAN CANCER, UNSPECIFIED LATERALITY (H): ICD-10-CM

## 2021-04-22 NOTE — PROGRESS NOTES
"Kayy is a 40 year old who is being evaluated via a billable video visit.      How would you like to obtain your AVS? MyChart  If the video visit is dropped, the invitation should be resent by: Send to e-mail at: [email or web address]  Will anyone else be joining your video visit? Yes:  - Chris. How would they like to receive their invitation? Send to e-mail at: [email or web address]   Vitals - Patient Reported  Weight (Patient Reported): 44.9 kg (99 lb)  Height (Patient Reported): 157.5 cm (5' 2\")  BMI (Based on Pt Reported Ht/Wt): 18.11  Pain Score: No Pain (0)      Lynette Watson CMA on 2021 at 9:54 AM          Video-Visit Details    Type of service:  Video Visit    Video Start Time: 10:10 am    Video End Time:10:25 am    Originating Location (pt. 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  She is initiating treatment with paclitaxel and carboplatin.  She presents for a disease management visit.  In light of the recent COVID19 pandemic, and in accordance with our group and national guidelines this patients care has been reviewed and it is felt that presenting for her visit would be more likely to increase rather than decrease her relative risks.  Therefore this visit was conducted by video.      Oncology History:  Initially, she was seen for vomiting and nausea, reduced oral intake, some urinary frequency with abdominal distention over a couple months.  She also lost about 8 pounds in the last couple of weeks.     3/17/2021:  191.  CT AP  IMPRESSION:   1.  Left adnexal soft tissue mass associated " with large volume ascites  and foci of peritoneal thickening is suspicious for ovarian/peritoneal  malignancy. Recommend gyne-oncology consultation.  2.  4.1 cm posterior uterine mass is likely a fibroid. Enhancing  nodule on the surface of the uterine fundus could be a subserosal  fibroid or peritoneal deposit.  3.  Focal ill-defined hypoenhancement in the liver, near the falciform  ligament, could be due to focal fatty infiltration versus peritoneal  deposit. Enhancing lesion in segment 8 is favored to be a hemangioma  but needs confirmation with MRI.  4.  Subcentimeter pelvic lymph nodes are indeterminate.  [Access Center: Left adnexal soft tissue mass associated with large  volume ascites and foci of peritoneal thickening is suspicious for  ovarian/peritoneal malignancy. Recommend gyne-oncology consultation.    3/19/21: CT chest  IMPRESSION:   1. No pulmonary nodules or lymphadenopathy.  2. Area of bony sclerosis in the sternum, concern for metastatic  Disease.    3/19/21: MRI abdomen  IMPRESSION: In this patient with concern for left ovarian neoplastic  lesion, the current MRI scan shows:  1. Multiple hepatic lesions, including:-  a. Indeterminate segment 6 subcapsular lesion, noting lack of  retention on the hepatobiliary phase and T2 hyperintensity.  Possibility of a sclerosing hemangioma should be considered along with  possibility of small metastatic lesion (considered less likely). If  this subcapsular lesion is not evaluated in the operating room as part  of staging procedure, recommend three-month follow-up MRI exam using  Eovist as intravenous contrast.  b. Hypodensity along the falciform ligament seen on recent CT exam has  been characterized by this MRI exam as focal fatty infiltration.  c. Multiple benign lesions, including at least 3 focal nodular  hyperplasias and hemangioma.  2. Moderate to large volume ascites with mild omental thickening,  presumably malignant in the setting of ovarian  malignancy. This is  better characterized on CT abdomen and pelvis exam.  3. Layering sludge in the gallbladder.    4/1/21: CEA <0.5. CA 19-9 9.    4/6/21:  Diagnostic laparoscopy with biopsies, converted to exploratory laparotomy, modified radical hysterectomy, bilateral salpingo-oophorectomy, bilateral ureterolysis, bladder peritonectomy, omentectomy, diaphragm stripping, liver mobilization, colon mobilization, small bowel serosa oversewing, resection of abdominal wall nodule  Partial hepatectomy performed by Surgical Oncology (Dr. Zuñiga)   Optimal debulking to no residual macroscopic residual disease R0  FINAL DIAGNOSIS:   A. OVARY, RIGHT, BIOPSY:   - Serous borderline tumor   B. NODULE, CUL DE SAC, EXCISION:   - Positive for serous carcinoma, low grade   C. OMENTUM, BIOPSY:   - Positive for serous carcinoma   D. OVARY AND FALLOPIAN TUBE, LEFT, SALPINGO-OOPHORECTOMY:   - Serous carcinoma of the ovary, low grade on a background of a borderline    tumor   - Multiple follicular cysts   - Fallopian tube with serosal involvement by low grade serous carcinoma   E. UTERUS, CERVIX, RIGHT FALLOPIAN TUBE AND OVARY, HYSTERECTOMY WITH RIGHT    SALPINGO-OOPHORECTOMY:   - Secretory-type endometrium   - Leiomyomas and one adenomyoma   - Uterine serosal involvement by low grade serous carcinoma,   endometriosis/endosalpingiosis and fibrous   adhesions   - Ovary with surface serous borderline tumor and fibrous adhesions   - Fallopian tube serosal inflammation and adhesions   F. NODULE, CUL DE SAC, EXCISION #2:   - Serosal hyperplasia and calcification   - Fibrous adhesions   - Negative for malignancy   G. NODULE, LEFT PELVIC SIDEWALL, EXCISION:   - Fibroadipose tissue, positive for low grade serous carcinoma   H. FALCIFORM LIGAMENT, BIOPSY:   - Fibroadipose tissue with endosalpingiosis   - Negative for malignancy   I. OMENTUM, OMENTECTOMY:   - Omental adipose tissue, positive for low grade serous carcinoma   (non-invasive  implants)   - Omental lymph node positive for metastatic low grade serous carcinoma,   and endosalpingiosis   J. NODULE, RIGHT LOWER QUADRANT ABDOMINAL WALL, EXCISION:   - Fibroadipose tissue, positive for low grade serous carcinoma   K. RIGHT EVERETT-DIAPHRAGM PERITONEUM, EXCISION:   - Fibroadipose tissue, positive for low grade serous carcinoma   - Endosalpingiosis, calcifications and fibrous adhesions   L. NODULE, LIVER SURFACE, EXCISION:   - Hepatic parenchyma and capsular endosalpingiosis   - Negative for malignancy   M. NODULE, SMALL BOWEL SEROSAL, EXCISION:   - Serosa with inflammation, necrosis and mesothelial hyperplasia   - Negative for malignancy   N. NODULE, SMALL BOWEL MESENTERY, EXCISION:   - Fibroadipose tissue, positive for low grade serous carcinoma   - Serosa with inflammation, necrosis and mesothelial hyperplasia   O. NODULE, SMALL BOWEL MESENTERY, EXCISION #2:   - Serosa with inflammation, necrosis and mesothelial hyperplasia   P. LIVER, PARTIAL LEFT HEPATECTOMY LEVEL 3:   - Focal nodular hyperplasia, FNH; negative for malignancy     (with typical trichrome stain and map-like pattern on glutamine   synthetase immunohistochemistry, with   appropriate controls)     Plan: Carboplatin AUC 6 and paclitaxel 175 mg/m2 x 6 cycles.                Today she reports with her  that she is feeling well and is without concern.  She has recovered well from surgery and is eating a drinking without difficulty with normal bowel and bladder function.  She had requested a refill of her oxycodone earlier this week but reports that she hasn't needed to take it.  She did have increased pain last week after a sneeze but otherwise she is having minimal pain.  She received the chemotherapy packet which she has reviewed and she has no questions.  Her daughter just completed leukemia treatment so they feel comfortable with the side effects of chemotherapy and managing symptoms.  They feel like they have enough support  currently managing the increased stress of her diagnosis and their daughters diagnosis and treatment.  She denies any changes in her bowel or bladder habits, no nausea/emesis, no lower extremity edema, and no difficulties eating or sleeping. She denies any abdominal discomfort/bloating, no fevers or chills, and no chest pain or shortness of breath.  She has some questions about resuming her usual supplements.  She is not vaccinated against COVID.  She does not feel like she will need another provider appointment next week prior to chemo.            Review of Systems:    Systemic           no weight changes; no fever; no chills; no night sweats; no appetite changes  Skin           no rashes, or lesions  Eye           no irritation; no changes in vision  Tiffany-Laryngeal           no dysphagia; no hoarseness   Pulmonary    no cough; no shortness of breath  Cardiovascular    no chest pain; no palpitations  Gastrointestinal    no diarrhea; no constipation; no abdominal pain; no changes in bowel habits; no blood in stool  Genitourinary   no urinary frequency; no urinary urgency; no dysuria; no pain; no abnormal vaginal discharge; no abnormal vaginal bleeding  Breast   no breast discharge; no breast changes; no breast pain  Musculoskeletal    no myalgias; no arthralgias; no back pain  Psychiatric           no depressed mood; no anxiety    Hematologic   no tender lymph nodes; no noticeable swellings or lumps   Endocrine    no hot flashes; no heat/cold intolerance         Neurological   no tremor; no numbness and tingling; no headaches; no difficulty sleeping      Past Medical History:    Past Medical History:   Diagnosis Date     Endometriosis          Past Surgical History:    Past Surgical History:   Procedure Laterality Date     GYN SURGERY      c section      HC TOOTH EXTRACTION W/FORCEP       HEPATECTOMY PARTIAL  4/6/2021    Procedure: Partial left hepatectomy level 3;  Surgeon: Chris Zuñiga MD;  Location:  OR      LAPAROSCOPIC ABLATION ENDOMETRIOSIS      2007     LAPAROSCOPY DIAGNOSTIC (GYN) N/A 4/6/2021    Procedure: Diagnostic laparoscopy with biopsies converted to exploratory laparotmy, modified radical hysterectomy bilateral salpingo-oophorectomy, bilateral ureterolysis, peritonectomy, omentectomy, resection of liver nodule, diapraghm stripping, liver mobilization, colon mobilization, resection of abdominal wall nodule, tumor debulking 2R0, no macroscopic residual disease;  Surgeon: Ben Ch         Health Maintenance Due   Topic Date Due     PREVENTIVE CARE VISIT  Never done     ADVANCE CARE PLANNING  Never done     Pneumococcal Vaccine: Pediatrics (0 to 5 Years) and At-Risk Patients (6 to 64 Years) (1 of 4 - PCV13) Never done     HIV SCREENING  Never done     COVID-19 Vaccine (1) Never done     HEPATITIS C SCREENING  Never done     DTAP/TDAP/TD IMMUNIZATION (1 - Tdap) Never done     PAP  03/01/2016       Current Medications:     Current Outpatient Medications   Medication Sig Dispense Refill     acetaminophen (TYLENOL) 325 MG tablet Take 2 tablets (650 mg) by mouth every 6 hours 60 tablet 0     ibuprofen (ADVIL/MOTRIN) 600 MG tablet Take 1 tablet (600 mg) by mouth every 6 hours as needed for moderate pain 60 tablet 0     senna-docusate (SENOKOT-S/PERICOLACE) 8.6-50 MG tablet Take 2 tablets by mouth 2 times daily as needed for constipation 60 tablet 0     oxyCODONE (ROXICODONE) 5 MG tablet Take 1 tablet (5 mg) by mouth every 12 hours as needed for severe pain (Patient not taking: Reported on 5/6/2021) 10 tablet 0     polyethylene glycol (MIRALAX) 17 g packet Take 17 g by mouth 2 times daily as needed for constipation (Patient not taking: Reported on 5/6/2021) 10 packet 1         Allergies:        Allergies   Allergen Reactions     Amoxicillin Hives     Full body hives after amoxacillin & Vicodin after wisdom teeth pulled when around 29 yo.     Vicodin [Hydrocodone-Acetaminophen] Rash        Social History:      Social History     Tobacco Use     Smoking status: Never Smoker     Smokeless tobacco: Never Used   Substance Use Topics     Alcohol use: No       History   Drug Use No         Family History:     The patient's family history is notable for:    Family History   Problem Relation Age of Onset     Blood Disease Mother         hemachromatosis      Diabetes Maternal Grandmother      Alzheimer Disease Maternal Grandmother      Multiple Sclerosis Maternal Grandmother      Pancreatic Cancer Paternal Grandmother      Leukemia Paternal Grandfather      Anesthesia Reaction No family hx of      Deep Vein Thrombosis (DVT) No family hx of          Physical Exam:     There were no vitals taken for this visit.  There is no height or weight on file to calculate BMI.      General Appearance: healthy and alert, no distress    Eyes:  Eyes grossly normal to inspection.  No discharge or erythema, or obvious scleral/conjunctival abnormalities.    Respiratory: No audible wheeze, cough, or visible cyanosis.  No visible retractions or increased work of breathing.     Musculoskeletal: extremities non tender and without edema    Skin: no lesions or rashes on visible skin    Neurological: normal gait, no gross defects     Psychiatric: appropriate mood and affect. Mentation appears normal, affect normal/bright, judgement and insight intact, normal speech and appearance well-groomed                            The rest of a comprehensive physical examination is deferred due to PHE (public health emergency) video visit restrictions.      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  She is initiating treatment with paclitaxel and carboplatin.  She presents for a disease management visit.  In light of the recent COVID19 pandemic, and in accordance with our group and national guidelines this patients care has been reviewed and it is felt that presenting for her visit  would be more likely to increase rather than decrease her relative risks.  Therefore this visit was conducted by video.    49 minutes spent on the date of the encounter doing chart review, history and exam, documentation, and further activities as noted above.              Plan:     1.) Ovarian cancer:  OK to proceed with cycle 1 of chemotherapy on Monday if labs WDL.  Orders to be signed by Dr. Ch as this is cycle 1 of treatment.  Reviewed patient s diagnosis and treatment plan (including paclitaxel and carboplatin) as recommended by Dr. Ch. The goal of treatment is curative intent.  Her appointments scheduled to date are viewable in [employer].  As she is comfortable will cancel her prechemo appointment prior to cycle 1 as all needs were addressed today.  Discussed reviewing all her supplements with the pharmacist when she comes for infusion on Monday for safety concerns with resuming these.  Reviewed signs and symptoms for when she should contact the clinic or seek additional care.  Patient to contact the clinic with any questions or concerns in the interim.        Discussed that the administration of chemotherapy can carry certain risks, including failure to obtain the desired result, discomforts, injury, and the need for additional therapy. Reviewed possible side effects of these drugs including: Allergic reaction; Pancytopenia including Anemia causing weakness/fatigue, Low WBC causing infection, Low platelets causing bruising/bleeding; Mouth sores; Hair loss; Fatigue; Brief periods of forgetfulness; Nausea and vomiting; Neuropathy; Diarrhea/Constipation; Hair loss; Skin and nail changes; Liver effects; Heart effects; Kidney/Bladder effects; Lung effects; Loss of appetite; Weight gain or loss; Visual/Auditory changes; Sexual effects; Mood effects; Menopausal symptoms; Reproductive/Fertility Effects. Discussed that the patient may have side effects from chemotherapy that have not been discussed  today because each patient may respond differently to chemotherapy and could have side effects that have not been previously reported by others.       Discussed ways to manage certain side effects at home and when to call the clinic or go to the emergency department.       Patient was provided with a copy of [facility] drug information regarding (carboplain and paclitaxel drugs); [facility] packet including: Getting Ready for Chemotherapy, Comparing Chemotherapy, Immunotherapy, and targeted Therapy, Your Cancer Care team and MyChart, Understanding Clinical Trials, Honoring Choices, Cancer Care Services, and Integrative Therapies; and contact information of the Gynecologic Oncology Clinic. Reviewed resources available including nutrition services, rehabilitation and exercise, pharmacy services, tobacco cessation programs, social work services, spiritual health, cancer risk management program, cancer survivorship program, and palliative care program.       Discussed nutrition and the importance of eating small frequent meal, high protein, and drinking 8-10 glasses of noncaffeinated and nonalcoholic beverages.         Genetic risk factors were assessed and she does meet qualification for referral.  She has an appointment to meet with a genetic counselor on 5/19/21.      Labs and/or tests ordered include:  CBC. CMP. Mag. .     2.) Health maintenance:  Issues addressed today include following up with PCP for annual health maintenance and non-gynecologic issues.     3.) COVID vaccine:  It is recommended that everyone receives the COVID vaccine.  Per NCC[facility] individuals receiving chemotherapy can receive this vaccine.      Bhavani Torrez, DNP, APRN, FNP-C  Nurse Practitioner   Division of Gynecologic Oncology  Pager: [phone]     CC  Patient Care Team:  Francis Villafana as PCP - General (Clinic)  Karol Hairston MD as Assigned PCP  Luis Ch MD as MD (Gynecologic  Oncology)  Johana Hairston MD as Referring Physician (Family Medicine)  Luis Ch MD as Assigned Cancer Care Provider  JOHANA HAIRSTON

## 2021-04-24 ENCOUNTER — HEALTH MAINTENANCE LETTER (OUTPATIENT)
Age: 41
End: 2021-04-24

## 2021-04-27 NOTE — ADDENDUM NOTE
Addendum  created 04/27/21 1441 by Mireya Nye MD    Clinical Note Signed, Intraprocedure Blocks edited

## 2021-05-03 DIAGNOSIS — Z98.890 POSTOPERATIVE STATE: ICD-10-CM

## 2021-05-03 RX ORDER — OXYCODONE HYDROCHLORIDE 5 MG/1
5 TABLET ORAL EVERY 12 HOURS PRN
Qty: 10 TABLET | Refills: 0 | Status: SHIPPED | OUTPATIENT
Start: 2021-05-03 | End: 2021-06-08

## 2021-05-03 NOTE — TELEPHONE ENCOUNTER
Patient reached out via [employer] requesting a refill of oxycodone. Patient is still needing in moments of severe pain.     MyChart in chart.     RN will route for NP to review and sign if appropriate.     Kiki Goyal RN

## 2021-05-06 ENCOUNTER — VIRTUAL VISIT (OUTPATIENT)
Dept: ONCOLOGY | Facility: CLINIC | Age: 41
End: 2021-05-06
Attending: NURSE PRACTITIONER
Payer: COMMERCIAL

## 2021-05-06 DIAGNOSIS — Z51.11 ENCOUNTER FOR ANTINEOPLASTIC CHEMOTHERAPY: ICD-10-CM

## 2021-05-06 DIAGNOSIS — Z11.59 SCREENING FOR VIRAL DISEASE: ICD-10-CM

## 2021-05-06 DIAGNOSIS — C56.9 OVARIAN CANCER, UNSPECIFIED LATERALITY (H): Primary | ICD-10-CM

## 2021-05-06 DIAGNOSIS — C56.9 OVARIAN CANCER, UNSPECIFIED LATERALITY (H): ICD-10-CM

## 2021-05-06 LAB
SARS-COV-2 RNA RESP QL NAA+PROBE: NORMAL
SPECIMEN SOURCE: NORMAL

## 2021-05-06 PROCEDURE — U0005 INFEC AGEN DETEC AMPLI PROBE: HCPCS | Performed by: OBSTETRICS & GYNECOLOGY

## 2021-05-06 PROCEDURE — U0003 INFECTIOUS AGENT DETECTION BY NUCLEIC ACID (DNA OR RNA); SEVERE ACUTE RESPIRATORY SYNDROME CORONAVIRUS 2 (SARS-COV-2) (CORONAVIRUS DISEASE [COVID-19]), AMPLIFIED PROBE TECHNIQUE, MAKING USE OF HIGH THROUGHPUT TECHNOLOGIES AS DESCRIBED BY CMS-2020-01-R: HCPCS | Performed by: OBSTETRICS & GYNECOLOGY

## 2021-05-06 PROCEDURE — 99215 OFFICE O/P EST HI 40 MIN: CPT | Mod: 24 | Performed by: NURSE PRACTITIONER

## 2021-05-06 PROCEDURE — 999N001193 HC VIDEO/TELEPHONE VISIT; NO CHARGE

## 2021-05-06 NOTE — LETTER
"    2021         RE: Kayy Salazar  [id number] [address] [facility] [address]        Dear Colleague,    Thank you for referring your patient, Kayy Salazar, to the [facility]. Please see a copy of my visit note below.    Kayy is a 40 year old who is being evaluated via a billable video visit.      How would you like to obtain your AVS? MyChart  If the video visit is dropped, the invitation should be resent by: Send to e-mail at: [email or web address]  Will anyone else be joining your video visit? Yes:  - Chris. How would they like to receive their invitation? Send to e-mail at: [email or web address]   Vitals - Patient Reported  Weight (Patient Reported): 44.9 kg (99 lb)  Height (Patient Reported): 157.5 cm (5' 2\")  BMI (Based on Pt Reported Ht/Wt): 18.11  Pain Score: No Pain (0)      Lynette Watson CMA on 2021 at 9:54 AM          Video-Visit Details    Type of service:  Video Visit    Video Start Time: 10:10 am    Video End Time:10:25 am    Originating Location (pt. 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  She is initiating treatment with paclitaxel and carboplatin.  She presents for a disease management visit.  In light of the recent COVID19 pandemic, and in accordance with our group and national guidelines this patients care has been reviewed and it is felt that presenting for her visit would be more likely to increase rather than decrease her relative risks.  Therefore this visit was conducted by video.      Oncology History:  Initially, she was seen for vomiting and " nausea, reduced oral intake, some urinary frequency with abdominal distention over a couple months.  She also lost about 8 pounds in the last couple of weeks.     3/17/2021:  191.  CT AP  IMPRESSION:   1.  Left adnexal soft tissue mass associated with large volume ascites  and foci of peritoneal thickening is suspicious for ovarian/peritoneal  malignancy. Recommend gyne-oncology consultation.  2.  4.1 cm posterior uterine mass is likely a fibroid. Enhancing  nodule on the surface of the uterine fundus could be a subserosal  fibroid or peritoneal deposit.  3.  Focal ill-defined hypoenhancement in the liver, near the falciform  ligament, could be due to focal fatty infiltration versus peritoneal  deposit. Enhancing lesion in segment 8 is favored to be a hemangioma  but needs confirmation with MRI.  4.  Subcentimeter pelvic lymph nodes are indeterminate.  [Access Center: Left adnexal soft tissue mass associated with large  volume ascites and foci of peritoneal thickening is suspicious for  ovarian/peritoneal malignancy. Recommend gyne-oncology consultation.    3/19/21: CT chest  IMPRESSION:   1. No pulmonary nodules or lymphadenopathy.  2. Area of bony sclerosis in the sternum, concern for metastatic  Disease.    3/19/21: MRI abdomen  IMPRESSION: In this patient with concern for left ovarian neoplastic  lesion, the current MRI scan shows:  1. Multiple hepatic lesions, including:-  a. Indeterminate segment 6 subcapsular lesion, noting lack of  retention on the hepatobiliary phase and T2 hyperintensity.  Possibility of a sclerosing hemangioma should be considered along with  possibility of small metastatic lesion (considered less likely). If  this subcapsular lesion is not evaluated in the operating room as part  of staging procedure, recommend three-month follow-up MRI exam using  Eovist as intravenous contrast.  b. Hypodensity along the falciform ligament seen on recent CT exam has  been characterized by this MRI  exam as focal fatty infiltration.  c. Multiple benign lesions, including at least 3 focal nodular  hyperplasias and hemangioma.  2. Moderate to large volume ascites with mild omental thickening,  presumably malignant in the setting of ovarian malignancy. This is  better characterized on CT abdomen and pelvis exam.  3. Layering sludge in the gallbladder.    4/1/21: CEA <0.5. CA 19-9 9.    4/6/21:  Diagnostic laparoscopy with biopsies, converted to exploratory laparotomy, modified radical hysterectomy, bilateral salpingo-oophorectomy, bilateral ureterolysis, bladder peritonectomy, omentectomy, diaphragm stripping, liver mobilization, colon mobilization, small bowel serosa oversewing, resection of abdominal wall nodule  Partial hepatectomy performed by Surgical Oncology (Dr. Zuñiga)   Optimal debulking to no residual macroscopic residual disease R0  FINAL DIAGNOSIS:   A. OVARY, RIGHT, BIOPSY:   - Serous borderline tumor   B. NODULE, CUL DE SAC, EXCISION:   - Positive for serous carcinoma, low grade   C. OMENTUM, BIOPSY:   - Positive for serous carcinoma   D. OVARY AND FALLOPIAN TUBE, LEFT, SALPINGO-OOPHORECTOMY:   - Serous carcinoma of the ovary, low grade on a background of a borderline    tumor   - Multiple follicular cysts   - Fallopian tube with serosal involvement by low grade serous carcinoma   E. UTERUS, CERVIX, RIGHT FALLOPIAN TUBE AND OVARY, HYSTERECTOMY WITH RIGHT    SALPINGO-OOPHORECTOMY:   - Secretory-type endometrium   - Leiomyomas and one adenomyoma   - Uterine serosal involvement by low grade serous carcinoma,   endometriosis/endosalpingiosis and fibrous   adhesions   - Ovary with surface serous borderline tumor and fibrous adhesions   - Fallopian tube serosal inflammation and adhesions   F. NODULE, CUL DE SAC, EXCISION #2:   - Serosal hyperplasia and calcification   - Fibrous adhesions   - Negative for malignancy   G. NODULE, LEFT PELVIC SIDEWALL, EXCISION:   - Fibroadipose tissue, positive for low  grade serous carcinoma   H. FALCIFORM LIGAMENT, BIOPSY:   - Fibroadipose tissue with endosalpingiosis   - Negative for malignancy   I. OMENTUM, OMENTECTOMY:   - Omental adipose tissue, positive for low grade serous carcinoma   (non-invasive implants)   - Omental lymph node positive for metastatic low grade serous carcinoma,   and endosalpingiosis   J. NODULE, RIGHT LOWER QUADRANT ABDOMINAL WALL, EXCISION:   - Fibroadipose tissue, positive for low grade serous carcinoma   K. RIGHT EVERETT-DIAPHRAGM PERITONEUM, EXCISION:   - Fibroadipose tissue, positive for low grade serous carcinoma   - Endosalpingiosis, calcifications and fibrous adhesions   L. NODULE, LIVER SURFACE, EXCISION:   - Hepatic parenchyma and capsular endosalpingiosis   - Negative for malignancy   M. NODULE, SMALL BOWEL SEROSAL, EXCISION:   - Serosa with inflammation, necrosis and mesothelial hyperplasia   - Negative for malignancy   N. NODULE, SMALL BOWEL MESENTERY, EXCISION:   - Fibroadipose tissue, positive for low grade serous carcinoma   - Serosa with inflammation, necrosis and mesothelial hyperplasia   O. NODULE, SMALL BOWEL MESENTERY, EXCISION #2:   - Serosa with inflammation, necrosis and mesothelial hyperplasia   P. LIVER, PARTIAL LEFT HEPATECTOMY LEVEL 3:   - Focal nodular hyperplasia, FNH; negative for malignancy     (with typical trichrome stain and map-like pattern on glutamine   synthetase immunohistochemistry, with   appropriate controls)     Plan: Carboplatin AUC 6 and paclitaxel 175 mg/m2 x 6 cycles.                Today she reports with her  that she is feeling well and is without concern.  She has recovered well from surgery and is eating a drinking without difficulty with normal bowel and bladder function.  She had requested a refill of her oxycodone earlier this week but reports that she hasn't needed to take it.  She did have increased pain last week after a sneeze but otherwise she is having minimal pain.  She received the  chemotherapy packet which she has reviewed and she has no questions.  Her daughter just completed leukemia treatment so they feel comfortable with the side effects of chemotherapy and managing symptoms.  They feel like they have enough support currently managing the increased stress of her diagnosis and their daughters diagnosis and treatment.  She denies any changes in her bowel or bladder habits, no nausea/emesis, no lower extremity edema, and no difficulties eating or sleeping. She denies any abdominal discomfort/bloating, no fevers or chills, and no chest pain or shortness of breath.  She has some questions about resuming her usual supplements.  She is not vaccinated against COVID.  She does not feel like she will need another provider appointment next week prior to chemo.            Review of Systems:    Systemic           no weight changes; no fever; no chills; no night sweats; no appetite changes  Skin           no rashes, or lesions  Eye           no irritation; no changes in vision  Tiffany-Laryngeal           no dysphagia; no hoarseness   Pulmonary    no cough; no shortness of breath  Cardiovascular    no chest pain; no palpitations  Gastrointestinal    no diarrhea; no constipation; no abdominal pain; no changes in bowel habits; no blood in stool  Genitourinary   no urinary frequency; no urinary urgency; no dysuria; no pain; no abnormal vaginal discharge; no abnormal vaginal bleeding  Breast   no breast discharge; no breast changes; no breast pain  Musculoskeletal    no myalgias; no arthralgias; no back pain  Psychiatric           no depressed mood; no anxiety    Hematologic   no tender lymph nodes; no noticeable swellings or lumps   Endocrine    no hot flashes; no heat/cold intolerance         Neurological   no tremor; no numbness and tingling; no headaches; no difficulty sleeping      Past Medical History:    Past Medical History:   Diagnosis Date     Endometriosis          Past Surgical History:    Past  Surgical History:   Procedure Laterality Date     GYN SURGERY      c section      HC TOOTH EXTRACTION W/FORCEP       HEPATECTOMY PARTIAL  4/6/2021    Procedure: Partial left hepatectomy level 3;  Surgeon: Chris Zuñiga MD;  Location: [facility] OR     LAPAROSCOPIC ABLATION ENDOMETRIOSIS      2007     LAPAROSCOPY DIAGNOSTIC (GYN) N/A 4/6/2021    Procedure: Diagnostic laparoscopy with biopsies converted to exploratory laparotmy, modified radical hysterectomy bilateral salpingo-oophorectomy, bilateral ureterolysis, peritonectomy, omentectomy, resection of liver nodule, diapraghm stripping, liver mobilization, colon mobilization, resection of abdominal wall nodule, tumor debulking 2R0, no macroscopic residual disease;  Surgeon: Ben Ch         Health Maintenance Due   Topic Date Due     PREVENTIVE CARE VISIT  Never done     ADVANCE CARE PLANNING  Never done     Pneumococcal Vaccine: Pediatrics (0 to 5 Years) and At-Risk Patients (6 to 64 Years) (1 of 4 - PCV13) Never done     HIV SCREENING  Never done     COVID-19 Vaccine (1) Never done     HEPATITIS C SCREENING  Never done     DTAP/TDAP/TD IMMUNIZATION (1 - Tdap) Never done     PAP  03/01/2016       Current Medications:     Current Outpatient Medications   Medication Sig Dispense Refill     acetaminophen (TYLENOL) 325 MG tablet Take 2 tablets (650 mg) by mouth every 6 hours 60 tablet 0     ibuprofen (ADVIL/MOTRIN) 600 MG tablet Take 1 tablet (600 mg) by mouth every 6 hours as needed for moderate pain 60 tablet 0     senna-docusate (SENOKOT-S/PERICOLACE) 8.6-50 MG tablet Take 2 tablets by mouth 2 times daily as needed for constipation 60 tablet 0     oxyCODONE (ROXICODONE) 5 MG tablet Take 1 tablet (5 mg) by mouth every 12 hours as needed for severe pain (Patient not taking: Reported on 5/6/2021) 10 tablet 0     polyethylene glycol (MIRALAX) 17 g packet Take 17 g by mouth 2 times daily as needed for constipation (Patient not taking: Reported on 5/6/2021) 10 packet 1          Allergies:        Allergies   Allergen Reactions     Amoxicillin Hives     Full body hives after amoxacillin & Vicodin after wisdom teeth pulled when around 27 yo.     Vicodin [Hydrocodone-Acetaminophen] Rash        Social History:     Social History     Tobacco Use     Smoking status: Never Smoker     Smokeless tobacco: Never Used   Substance Use Topics     Alcohol use: No       History   Drug Use No         Family History:     The patient's family history is notable for:    Family History   Problem Relation Age of Onset     Blood Disease Mother         hemachromatosis      Diabetes Maternal Grandmother      Alzheimer Disease Maternal Grandmother      Multiple Sclerosis Maternal Grandmother      Pancreatic Cancer Paternal Grandmother      Leukemia Paternal Grandfather      Anesthesia Reaction No family hx of      Deep Vein Thrombosis (DVT) No family hx of          Physical Exam:     There were no vitals taken for this visit.  There is no height or weight on file to calculate BMI.      General Appearance: healthy and alert, no distress    Eyes:  Eyes grossly normal to inspection.  No discharge or erythema, or obvious scleral/conjunctival abnormalities.    Respiratory: No audible wheeze, cough, or visible cyanosis.  No visible retractions or increased work of breathing.     Musculoskeletal: extremities non tender and without edema    Skin: no lesions or rashes on visible skin    Neurological: normal gait, no gross defects     Psychiatric: appropriate mood and affect. Mentation appears normal, affect normal/bright, judgement and insight intact, normal speech and appearance well-groomed                            The rest of a comprehensive physical examination is deferred due to PHE (public health emergency) video visit restrictions.      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  She is initiating treatment with paclitaxel and  carboplatin.  She presents for a disease management visit.  In light of the recent COVID19 pandemic, and in accordance with our group and national guidelines this patients care has been reviewed and it is felt that presenting for her visit would be more likely to increase rather than decrease her relative risks.  Therefore this visit was conducted by video.    49 minutes spent on the date of the encounter doing chart review, history and exam, documentation, and further activities as noted above.              Plan:     1.) Ovarian cancer:  OK to proceed with cycle 1 of chemotherapy on Monday if labs WDL.  Orders to be signed by Dr. Ch as this is cycle 1 of treatment.  Reviewed patient s diagnosis and treatment plan (including paclitaxel and carboplatin) as recommended by Dr. Ch. The goal of treatment is curative intent.  Her appointments scheduled to date are viewable in [facility].  As she is comfortable will cancel her prechemo appointment prior to cycle 1 as all needs were addressed today.  Discussed reviewing all her supplements with the pharmacist when she comes for infusion on Monday for safety concerns with resuming these.  Reviewed signs and symptoms for when she should contact the clinic or seek additional care.  Patient to contact the clinic with any questions or concerns in the interim.        Discussed that the administration of chemotherapy can carry certain risks, including failure to obtain the desired result, discomforts, injury, and the need for additional therapy. Reviewed possible side effects of these drugs including: Allergic reaction; Pancytopenia including Anemia causing weakness/fatigue, Low WBC causing infection, Low platelets causing bruising/bleeding; Mouth sores; Hair loss; Fatigue; Brief periods of forgetfulness; Nausea and vomiting; Neuropathy; Diarrhea/Constipation; Hair loss; Skin and nail changes; Liver effects; Heart effects; Kidney/Bladder effects; Lung effects; Loss of  appetite; Weight gain or loss; Visual/Auditory changes; Sexual effects; Mood effects; Menopausal symptoms; Reproductive/Fertility Effects. Discussed that the patient may have side effects from chemotherapy that have not been discussed today because each patient may respond differently to chemotherapy and could have side effects that have not been previously reported by others.       Discussed ways to manage certain side effects at home and when to call the clinic or go to the emergency department.       Patient was provided with a copy of [facility] drug information regarding (carboplain and paclitaxel drugs); [facility] Cancer Care packet including: Getting Ready for Chemotherapy, Comparing Chemotherapy, Immunotherapy, and targeted Therapy, Your Cancer Care team and MyChart, Understanding Clinical Trials, Honoring Choices, Cancer Care Services, and Integrative Therapies; and contact information of the Gynecologic Oncology Clinic. Reviewed resources available including nutrition services, rehabilitation and exercise, pharmacy services, tobacco cessation programs, social work services, spiritual health, cancer risk management program, cancer survivorship program, and palliative care program.       Discussed nutrition and the importance of eating small frequent meal, high protein, and drinking 8-10 glasses of noncaffeinated and nonalcoholic beverages.         Genetic risk factors were assessed and she does meet qualification for referral.  She has an appointment to meet with a genetic counselor on 5/19/21.      Labs and/or tests ordered include:  CBC. CMP. Mag. .     2.) Health maintenance:  Issues addressed today include following up with PCP for annual health maintenance and non-gynecologic issues.     3.) COVID vaccine:  It is recommended that everyone receives the COVID vaccine.  Per [facility] individuals receiving chemotherapy can receive this vaccine.      Bhavani Torrez, DNP, APRN, FNP-C  Nurse  Practitioner   Division of Gynecologic Oncology  Pager: [phone]     CC  Patient Care Team:  Francis Villafana as PCP - General (Clinic)  Karol Hairston MD as Assigned PCP  Luis Ch MD as Assigned Cancer Care Provider

## 2021-05-07 LAB
LABORATORY COMMENT REPORT: NORMAL
SARS-COV-2 RNA RESP QL NAA+PROBE: NEGATIVE
SPECIMEN SOURCE: NORMAL

## 2021-05-10 ENCOUNTER — INFUSION THERAPY VISIT (OUTPATIENT)
Dept: INFUSION THERAPY | Facility: CLINIC | Age: 41
End: 2021-05-10
Payer: COMMERCIAL

## 2021-05-10 VITALS
TEMPERATURE: 98.8 F | WEIGHT: 100.7 LBS | BODY MASS INDEX: 18.42 KG/M2 | RESPIRATION RATE: 16 BRPM | OXYGEN SATURATION: 98 % | SYSTOLIC BLOOD PRESSURE: 121 MMHG | HEART RATE: 66 BPM | DIASTOLIC BLOOD PRESSURE: 80 MMHG

## 2021-05-10 DIAGNOSIS — C56.9 OVARIAN CANCER, UNSPECIFIED LATERALITY (H): Primary | ICD-10-CM

## 2021-05-10 LAB
ALBUMIN SERPL-MCNC: 4.1 G/DL (ref 3.4–5)
ALP SERPL-CCNC: 57 U/L (ref 40–150)
ALT SERPL W P-5'-P-CCNC: 29 U/L (ref 0–50)
ANION GAP SERPL CALCULATED.3IONS-SCNC: 3 MMOL/L (ref 3–14)
AST SERPL W P-5'-P-CCNC: 16 U/L (ref 0–45)
BASOPHILS # BLD AUTO: 0.1 10E9/L (ref 0–0.2)
BASOPHILS NFR BLD AUTO: 0.9 %
BILIRUB SERPL-MCNC: 0.5 MG/DL (ref 0.2–1.3)
BUN SERPL-MCNC: 12 MG/DL (ref 7–30)
CALCIUM SERPL-MCNC: 9 MG/DL (ref 8.5–10.1)
CANCER AG125 SERPL-ACNC: 14 U/ML (ref 0–30)
CHLORIDE SERPL-SCNC: 109 MMOL/L (ref 94–109)
CO2 SERPL-SCNC: 28 MMOL/L (ref 20–32)
CREAT SERPL-MCNC: 0.69 MG/DL (ref 0.52–1.04)
DIFFERENTIAL METHOD BLD: ABNORMAL
EOSINOPHIL # BLD AUTO: 0.4 10E9/L (ref 0–0.7)
EOSINOPHIL NFR BLD AUTO: 6.3 %
ERYTHROCYTE [DISTWIDTH] IN BLOOD BY AUTOMATED COUNT: 18.5 % (ref 10–15)
GFR SERPL CREATININE-BSD FRML MDRD: >90 ML/MIN/{1.73_M2}
GLUCOSE SERPL-MCNC: 97 MG/DL (ref 70–99)
HCT VFR BLD AUTO: 33.5 % (ref 35–47)
HGB BLD-MCNC: 10.5 G/DL (ref 11.7–15.7)
IMM GRANULOCYTES # BLD: 0 10E9/L (ref 0–0.4)
IMM GRANULOCYTES NFR BLD: 0.4 %
LYMPHOCYTES # BLD AUTO: 1.9 10E9/L (ref 0.8–5.3)
LYMPHOCYTES NFR BLD AUTO: 28.1 %
MAGNESIUM SERPL-MCNC: 2.2 MG/DL (ref 1.6–2.3)
MCH RBC QN AUTO: 25.6 PG (ref 26.5–33)
MCHC RBC AUTO-ENTMCNC: 31.3 G/DL (ref 31.5–36.5)
MCV RBC AUTO: 82 FL (ref 78–100)
MONOCYTES # BLD AUTO: 0.3 10E9/L (ref 0–1.3)
MONOCYTES NFR BLD AUTO: 4.1 %
NEUTROPHILS # BLD AUTO: 4.1 10E9/L (ref 1.6–8.3)
NEUTROPHILS NFR BLD AUTO: 60.2 %
PLATELET # BLD AUTO: 269 10E9/L (ref 150–450)
POTASSIUM SERPL-SCNC: 4.2 MMOL/L (ref 3.4–5.3)
PROT SERPL-MCNC: 7.8 G/DL (ref 6.8–8.8)
RBC # BLD AUTO: 4.1 10E12/L (ref 3.8–5.2)
SODIUM SERPL-SCNC: 140 MMOL/L (ref 133–144)
WBC # BLD AUTO: 6.8 10E9/L (ref 4–11)

## 2021-05-10 PROCEDURE — 83735 ASSAY OF MAGNESIUM: CPT | Performed by: OBSTETRICS & GYNECOLOGY

## 2021-05-10 PROCEDURE — 86304 IMMUNOASSAY TUMOR CA 125: CPT | Performed by: OBSTETRICS & GYNECOLOGY

## 2021-05-10 PROCEDURE — 96375 TX/PRO/DX INJ NEW DRUG ADDON: CPT | Performed by: NURSE PRACTITIONER

## 2021-05-10 PROCEDURE — 85025 COMPLETE CBC W/AUTO DIFF WBC: CPT | Performed by: OBSTETRICS & GYNECOLOGY

## 2021-05-10 PROCEDURE — 96367 TX/PROPH/DG ADDL SEQ IV INF: CPT | Performed by: NURSE PRACTITIONER

## 2021-05-10 PROCEDURE — 96417 CHEMO IV INFUS EACH ADDL SEQ: CPT | Performed by: NURSE PRACTITIONER

## 2021-05-10 PROCEDURE — S0028 INJECTION, FAMOTIDINE, 20 MG: HCPCS | Performed by: NURSE PRACTITIONER

## 2021-05-10 PROCEDURE — 99207 PR NO CHARGE LOS: CPT

## 2021-05-10 PROCEDURE — 96413 CHEMO IV INFUSION 1 HR: CPT | Performed by: NURSE PRACTITIONER

## 2021-05-10 PROCEDURE — 96415 CHEMO IV INFUSION ADDL HR: CPT | Performed by: NURSE PRACTITIONER

## 2021-05-10 PROCEDURE — 36415 COLL VENOUS BLD VENIPUNCTURE: CPT | Performed by: OBSTETRICS & GYNECOLOGY

## 2021-05-10 PROCEDURE — 80053 COMPREHEN METABOLIC PANEL: CPT | Performed by: OBSTETRICS & GYNECOLOGY

## 2021-05-10 RX ORDER — DIPHENHYDRAMINE HCL 25 MG
50 CAPSULE ORAL ONCE
Status: CANCELLED
Start: 2021-05-10

## 2021-05-10 RX ORDER — METHYLPREDNISOLONE SODIUM SUCCINATE 125 MG/2ML
125 INJECTION, POWDER, LYOPHILIZED, FOR SOLUTION INTRAMUSCULAR; INTRAVENOUS
Status: CANCELLED
Start: 2021-05-10

## 2021-05-10 RX ORDER — LORAZEPAM 2 MG/ML
1 INJECTION INTRAMUSCULAR EVERY 6 HOURS PRN
Status: CANCELLED
Start: 2021-05-10

## 2021-05-10 RX ORDER — MEPERIDINE HYDROCHLORIDE 25 MG/ML
25 INJECTION INTRAMUSCULAR; INTRAVENOUS; SUBCUTANEOUS EVERY 30 MIN PRN
Status: CANCELLED | OUTPATIENT
Start: 2021-05-10

## 2021-05-10 RX ORDER — HEPARIN SODIUM,PORCINE 10 UNIT/ML
5 VIAL (ML) INTRAVENOUS
Status: CANCELLED | OUTPATIENT
Start: 2021-05-10

## 2021-05-10 RX ORDER — EPINEPHRINE 1 MG/ML
0.3 INJECTION, SOLUTION INTRAMUSCULAR; SUBCUTANEOUS EVERY 5 MIN PRN
Status: CANCELLED | OUTPATIENT
Start: 2021-05-10

## 2021-05-10 RX ORDER — ALBUTEROL SULFATE 90 UG/1
1-2 AEROSOL, METERED RESPIRATORY (INHALATION)
Status: CANCELLED
Start: 2021-05-10

## 2021-05-10 RX ORDER — DIPHENHYDRAMINE HYDROCHLORIDE 50 MG/ML
50 INJECTION INTRAMUSCULAR; INTRAVENOUS
Status: CANCELLED
Start: 2021-05-10

## 2021-05-10 RX ORDER — PROCHLORPERAZINE MALEATE 10 MG
10 TABLET ORAL EVERY 6 HOURS PRN
Qty: 30 TABLET | Refills: 5 | Status: SHIPPED | OUTPATIENT
Start: 2021-05-10 | End: 2021-06-02

## 2021-05-10 RX ORDER — PALONOSETRON 0.05 MG/ML
0.25 INJECTION, SOLUTION INTRAVENOUS ONCE
Status: CANCELLED
Start: 2021-05-10

## 2021-05-10 RX ORDER — HEPARIN SODIUM (PORCINE) LOCK FLUSH IV SOLN 100 UNIT/ML 100 UNIT/ML
5 SOLUTION INTRAVENOUS
Status: CANCELLED | OUTPATIENT
Start: 2021-05-10

## 2021-05-10 RX ORDER — PALONOSETRON 0.05 MG/ML
0.25 INJECTION, SOLUTION INTRAVENOUS ONCE
Status: COMPLETED | OUTPATIENT
Start: 2021-05-10 | End: 2021-05-10

## 2021-05-10 RX ORDER — ALBUTEROL SULFATE 0.83 MG/ML
2.5 SOLUTION RESPIRATORY (INHALATION)
Status: CANCELLED | OUTPATIENT
Start: 2021-05-10

## 2021-05-10 RX ORDER — SODIUM CHLORIDE 9 MG/ML
1000 INJECTION, SOLUTION INTRAVENOUS CONTINUOUS PRN
Status: CANCELLED
Start: 2021-05-10

## 2021-05-10 RX ORDER — NALOXONE HYDROCHLORIDE 0.4 MG/ML
.1-.4 INJECTION, SOLUTION INTRAMUSCULAR; INTRAVENOUS; SUBCUTANEOUS
Status: CANCELLED | OUTPATIENT
Start: 2021-05-10

## 2021-05-10 RX ADMIN — Medication 250 ML: at 10:35

## 2021-05-10 RX ADMIN — PALONOSETRON 0.25 MG: 0.05 INJECTION, SOLUTION INTRAVENOUS at 11:18

## 2021-05-10 ASSESSMENT — PAIN SCALES - GENERAL: PAINLEVEL: MILD PAIN (2)

## 2021-05-10 NOTE — PROGRESS NOTES
Infusion Nursing Note:  Kayy Salazar presents today for C1 D1 Taxol/Carbo.    Patient seenby provider today: No   present during visit today: Not Applicable.    Note: .     Patient had a negative covid test on 5/6/21    Intravenous Access:  Peripheral IV placed.    Treatment Conditions:  Lab Results   Component Value Date    HGB 10.5 05/10/2021     Lab Results   Component Value Date    WBC 6.8 05/10/2021      Lab Results   Component Value Date    ANEU 4.1 05/10/2021     Lab Results   Component Value Date     05/10/2021      Lab Results   Component Value Date     05/10/2021                   Lab Results   Component Value Date    POTASSIUM 4.2 05/10/2021           Lab Results   Component Value Date    MAG 2.2 05/10/2021            Lab Results   Component Value Date    CR 0.69 05/10/2021                   Lab Results   Component Value Date    SEBAS 9.0 05/10/2021                Lab Results   Component Value Date    BILITOTAL 0.5 05/10/2021           Lab Results   Component Value Date    ALBUMIN 4.1 05/10/2021                    Lab Results   Component Value Date    ALT 29 05/10/2021           Lab Results   Component Value Date    AST 16 05/10/2021       Results reviewed, labs MET treatment parameters, ok to proceed with treatment.      Post Infusion Assessment:  Patient tolerated infusion without incident.  Blood return noted pre and post infusion.  Site patent and intact, free from redness, edema or discomfort.  No evidence of extravasations.  Access discontinued per protocol.       Discharge Plan:   Discharge instructions reviewed with: Patient.  Patient and/or family verbalized understanding of discharge instructions and all questions answered.  Patient discharged in stable condition accompanied by: self.  Departure Mode: Ambulatory.    Rachel Eaton RN

## 2021-05-17 NOTE — PROGRESS NOTES
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  She is currently undergoing adjuvant treatment with paclitaxel and carboplatin.  She presents for follow up and disease management.        Oncology History:  Initially, she was seen for vomiting and nausea, reduced oral intake, some urinary frequency with abdominal distention over a couple months.  She also lost about 8 pounds in the last couple of weeks.     3/17/2021:  191.  CT AP  IMPRESSION:   1.  Left adnexal soft tissue mass associated with large volume ascites  and foci of peritoneal thickening is suspicious for ovarian/peritoneal  malignancy. Recommend gyne-oncology consultation.  2.  4.1 cm posterior uterine mass is likely a fibroid. Enhancing  nodule on the surface of the uterine fundus could be a subserosal  fibroid or peritoneal deposit.  3.  Focal ill-defined hypoenhancement in the liver, near the falciform  ligament, could be due to focal fatty infiltration versus peritoneal  deposit. Enhancing lesion in segment 8 is favored to be a hemangioma  but needs confirmation with MRI.  4.  Subcentimeter pelvic lymph nodes are indeterminate.  [Access Center: Left adnexal soft tissue mass associated with large  volume ascites and foci of peritoneal thickening is suspicious for  ovarian/peritoneal malignancy. Recommend gyne-oncology consultation.    3/19/21: CT chest  IMPRESSION:   1. No pulmonary nodules or lymphadenopathy.  2. Area of bony sclerosis in the sternum, concern for metastatic  Disease.    3/19/21: MRI abdomen  IMPRESSION: In this patient with concern for left ovarian neoplastic  lesion, the current MRI scan shows:  1. Multiple hepatic lesions, including:-  a. Indeterminate segment 6 subcapsular lesion, noting lack  of  retention on the hepatobiliary phase and T2 hyperintensity.  Possibility of a sclerosing hemangioma should be considered along with  possibility of small metastatic lesion (considered less likely). If  this subcapsular lesion is not evaluated in the operating room as part  of staging procedure, recommend three-month follow-up MRI exam using  Eovist as intravenous contrast.  b. Hypodensity along the falciform ligament seen on recent CT exam has  been characterized by this MRI exam as focal fatty infiltration.  c. Multiple benign lesions, including at least 3 focal nodular  hyperplasias and hemangioma.  2. Moderate to large volume ascites with mild omental thickening,  presumably malignant in the setting of ovarian malignancy. This is  better characterized on CT abdomen and pelvis exam.  3. Layering sludge in the gallbladder.    4/1/21: CEA <0.5. CA 19-9 9.    4/6/21:  Diagnostic laparoscopy with biopsies, converted to exploratory laparotomy, modified radical hysterectomy, bilateral salpingo-oophorectomy, bilateral ureterolysis, bladder peritonectomy, omentectomy, diaphragm stripping, liver mobilization, colon mobilization, small bowel serosa oversewing, resection of abdominal wall nodule  Partial hepatectomy performed by Surgical Oncology (Dr. Zuñiga)   Optimal debulking to no residual macroscopic residual disease R0  FINAL DIAGNOSIS:   A. OVARY, RIGHT, BIOPSY:   - Serous borderline tumor   B. NODULE, CUL DE SAC, EXCISION:   - Positive for serous carcinoma, low grade   C. OMENTUM, BIOPSY:   - Positive for serous carcinoma   D. OVARY AND FALLOPIAN TUBE, LEFT, SALPINGO-OOPHORECTOMY:   - Serous carcinoma of the ovary, low grade on a background of a borderline    tumor   - Multiple follicular cysts   - Fallopian tube with serosal involvement by low grade serous carcinoma   E. UTERUS, CERVIX, RIGHT FALLOPIAN TUBE AND OVARY, HYSTERECTOMY WITH RIGHT    SALPINGO-OOPHORECTOMY:   - Secretory-type endometrium   - Leiomyomas  and one adenomyoma   - Uterine serosal involvement by low grade serous carcinoma,   endometriosis/endosalpingiosis and fibrous   adhesions   - Ovary with surface serous borderline tumor and fibrous adhesions   - Fallopian tube serosal inflammation and adhesions   F. NODULE, CUL DE SAC, EXCISION #2:   - Serosal hyperplasia and calcification   - Fibrous adhesions   - Negative for malignancy   G. NODULE, LEFT PELVIC SIDEWALL, EXCISION:   - Fibroadipose tissue, positive for low grade serous carcinoma   H. FALCIFORM LIGAMENT, BIOPSY:   - Fibroadipose tissue with endosalpingiosis   - Negative for malignancy   I. OMENTUM, OMENTECTOMY:   - Omental adipose tissue, positive for low grade serous carcinoma   (non-invasive implants)   - Omental lymph node positive for metastatic low grade serous carcinoma,   and endosalpingiosis   J. NODULE, RIGHT LOWER QUADRANT ABDOMINAL WALL, EXCISION:   - Fibroadipose tissue, positive for low grade serous carcinoma   K. RIGHT EVERETT-DIAPHRAGM PERITONEUM, EXCISION:   - Fibroadipose tissue, positive for low grade serous carcinoma   - Endosalpingiosis, calcifications and fibrous adhesions   L. NODULE, LIVER SURFACE, EXCISION:   - Hepatic parenchyma and capsular endosalpingiosis   - Negative for malignancy   M. NODULE, SMALL BOWEL SEROSAL, EXCISION:   - Serosa with inflammation, necrosis and mesothelial hyperplasia   - Negative for malignancy   N. NODULE, SMALL BOWEL MESENTERY, EXCISION:   - Fibroadipose tissue, positive for low grade serous carcinoma   - Serosa with inflammation, necrosis and mesothelial hyperplasia   O. NODULE, SMALL BOWEL MESENTERY, EXCISION #2:   - Serosa with inflammation, necrosis and mesothelial hyperplasia   P. LIVER, PARTIAL LEFT HEPATECTOMY LEVEL 3:   - Focal nodular hyperplasia, FNH; negative for malignancy     (with typical trichrome stain and map-like pattern on glutamine   synthetase immunohistochemistry, with   appropriate controls)     Plan: Carboplatin AUC 6 and  paclitaxel 175 mg/m2 x 6 cycles.    5/10/21: Cycle 1 carboplatin and paclitaxel.   14.  6/2/21: Cycle 2 carboplatin and paclitaxel.   pending.                Today she comes to clinic feeling well overall.  She did have some significant nausea for about 5 days after chemo.  No vomiting.  She was able to manage this with compazine every 6 hours.  She needs a refill.  She was able to continue to drink fluids without difficulty but was only able to tolerate a few bites of food at a time.  She has zofran at home but didn't take this as she was told she shouldn't.  She lost a few pounds during this time but gained it back quickly when she felt better.  She also had some diarrhea for the first 3 days then developed constipation.  The diarrhea was 3-4 episodes a day of mostly pure liquid stools.  Her constipation lasted 2 days and improved with Senokot.  She had fatigue for about 5 days and was significant enough where she needed to lay on the couch during the day then improved to the point she was able to go for a run.  She developed some numbness and tingling in her palms and finger that started about a week and a half ago and has now resolved.  She does take a vitamin B6 supplement. She denies any vaginal bleeding, no changes in her bladder habits, no emesis, no lower extremity edema, and no abdominal discomfort/bloating, no fevers or chills, and no chest pain or shortness of breath.  She has some questions about the genetic testing on her tumor.          Review of Systems:    Systemic           +weight loss; no fever; no chills; no night sweats; +decreased appetite  Skin           no rashes, or lesions  Eye           no irritation; no changes in vision  Tiffany-Laryngeal           no dysphagia; no hoarseness   Pulmonary    no cough; no shortness of breath  Cardiovascular    no chest pain; no palpitations  Gastrointestinal    + diarrhea; + constipation; no abdominal pain; + changes in bowel habits; no blood in  stool  Genitourinary   no urinary frequency; no urinary urgency; no dysuria; no pain; no abnormal vaginal discharge; no abnormal vaginal bleeding  Breast   no breast discharge; no breast changes; no breast pain  Musculoskeletal    no myalgias; no arthralgias; no back pain  Psychiatric           no depressed mood; no anxiety    Hematologic   no tender lymph nodes; no noticeable swellings or lumps   Endocrine    no hot flashes; no heat/cold intolerance         Neurological   no tremor; + numbness and tingling; no headaches; no difficulty sleeping      Past Medical History:    Past Medical History:   Diagnosis Date     Endometriosis          Past Surgical History:    Past Surgical History:   Procedure Laterality Date     GYN SURGERY      c section      HC TOOTH EXTRACTION W/FORCEP       HEPATECTOMY PARTIAL  4/6/2021    Procedure: Partial left hepatectomy level 3;  Surgeon: Chris Zuñiga MD;  Location: [facility] OR     LAPAROSCOPIC ABLATION ENDOMETRIOSIS      2007     LAPAROSCOPY DIAGNOSTIC (GYN) N/A 4/6/2021    Procedure: Diagnostic laparoscopy with biopsies converted to exploratory laparotmy, modified radical hysterectomy bilateral salpingo-oophorectomy, bilateral ureterolysis, peritonectomy, omentectomy, resection of liver nodule, diapraghm stripping, liver mobilization, colon mobilization, resection of abdominal wall nodule, tumor debulking 2R0, no macroscopic residual disease;  Surgeon: Ben Ch         Health Maintenance Due   Topic Date Due     PREVENTIVE CARE VISIT  Never done     ADVANCE CARE PLANNING  Never done     Pneumococcal Vaccine: Pediatrics (0 to 5 Years) and At-Risk Patients (6 to 64 Years) (1 of 4 - PCV13) Never done     COVID-19 Vaccine (1) Never done     HIV SCREENING  Never done     HEPATITIS C SCREENING  Never done     DTAP/TDAP/TD IMMUNIZATION (1 - Tdap) Never done     PAP  03/01/2016       Current Medications:     Current Outpatient Medications   Medication Sig Dispense Refill      senna-docusate (SENOKOT-S/PERICOLACE) 8.6-50 MG tablet Take 2 tablets by mouth 2 times daily as needed for constipation 60 tablet 0     acetaminophen (TYLENOL) 325 MG tablet Take 2 tablets (650 mg) by mouth every 6 hours 60 tablet 0     ibuprofen (ADVIL/MOTRIN) 600 MG tablet Take 1 tablet (600 mg) by mouth every 6 hours as needed for moderate pain (Patient not taking: Reported on 6/2/2021) 60 tablet 0     oxyCODONE (ROXICODONE) 5 MG tablet Take 1 tablet (5 mg) by mouth every 12 hours as needed for severe pain (Patient not taking: Reported on 5/6/2021) 10 tablet 0     polyethylene glycol (MIRALAX) 17 g packet Take 17 g by mouth 2 times daily as needed for constipation (Patient not taking: Reported on 5/6/2021) 10 packet 1     prochlorperazine (COMPAZINE) 10 MG tablet Take 1 tablet (10 mg) by mouth every 6 hours as needed (nausea/vomiting) (Patient not taking: Reported on 6/2/2021) 30 tablet 5         Allergies:        Allergies   Allergen Reactions     Amoxicillin Hives     Full body hives after amoxacillin & Vicodin after wisdom teeth pulled when around 29 yo.     Vicodin [Hydrocodone-Acetaminophen] Rash        Social History:     Social History     Tobacco Use     Smoking status: Never Smoker     Smokeless tobacco: Never Used   Substance Use Topics     Alcohol use: No       History   Drug Use No         Family History:     The patient's family history is notable for:    Family History   Problem Relation Age of Onset     Blood Disease Mother         hemachromatosis      Diabetes Maternal Grandmother      Alzheimer Disease Maternal Grandmother      Multiple Sclerosis Maternal Grandmother      Pancreatic Cancer Paternal Grandmother      Leukemia Paternal Grandfather      Anesthesia Reaction No family hx of      Deep Vein Thrombosis (DVT) No family hx of          Physical Exam:     /66 (BP Location: Left arm, Patient Position: Sitting)   Pulse 75   Temp 98.3  F (36.8  C) (Oral)   Resp 14   Wt 45.6 kg (100 lb  8 oz)   LMP 03/11/2021   SpO2 100%   BMI 18.38 kg/m    Body mass index is 18.38 kg/m .    General Appearance: healthy and alert, no distress     HEENT: no palpable nodules or masses        Cardiovascular: regular rate and rhythm, no gallops, rubs or murmurs     Respiratory: lungs clear, no rales, rhonchi or wheezes    Musculoskeletal: extremities non tender and without edema    Skin: no lesions or rashes     Neurological: normal gait, no gross defects     Psychiatric: appropriate mood and affect                               Hematological: normal cervical and supraclavicular inguinal lymph nodes     Gastrointestinal:       abdomen soft, non-tender, non-distended    Genitourinary: Deferred.    Lab Results   Component Value Date    WBC 5.2 06/02/2021     Lab Results   Component Value Date    RBC 4.54 06/02/2021     Lab Results   Component Value Date    HGB 11.8 06/02/2021     Lab Results   Component Value Date    HCT 37.3 06/02/2021     No components found for: MCT  Lab Results   Component Value Date    MCV 82 06/02/2021     Lab Results   Component Value Date    MCH 26.0 06/02/2021     Lab Results   Component Value Date    MCHC 31.6 06/02/2021     Lab Results   Component Value Date    RDW 17.8 06/02/2021     Lab Results   Component Value Date     06/02/2021     % Neutrophils   Date Value Ref Range Status   06/02/2021 51.7 % Final     % Lymphocytes   Date Value Ref Range Status   06/02/2021 38.8 % Final     % Monocytes   Date Value Ref Range Status   06/02/2021 5.2 % Final     % Eosinophils   Date Value Ref Range Status   06/02/2021 2.7 % Final     % Basophils   Date Value Ref Range Status   06/02/2021 1.2 % Final     % Immature Granulocytes   Date Value Ref Range Status   06/02/2021 0.4 % Final     Absolute Neutrophil   Date Value Ref Range Status   06/02/2021 2.7 1.6 - 8.3 10e9/L Final     Absolute Lymphocytes   Date Value Ref Range Status   06/02/2021 2.0 0.8 - 5.3 10e9/L Final     Absolute Monocytes    Date Value Ref Range Status   06/02/2021 0.3 0.0 - 1.3 10e9/L Final     Absolute Eosinophils   Date Value Ref Range Status   06/02/2021 0.1 0.0 - 0.7 10e9/L Final     Absolute Basophils   Date Value Ref Range Status   06/02/2021 0.1 0.0 - 0.2 10e9/L Final     Abs Immature Granulocytes   Date Value Ref Range Status   06/02/2021 0.0 0 - 0.4 10e9/L Final     Nucleated RBCs   Date Value Ref Range Status   03/20/2021 0 0 /100 Final     Absolute Nucleated RBC   Date Value Ref Range Status   03/20/2021 0.0  Final     Last Comprehensive Metabolic Panel:  Sodium   Date Value Ref Range Status   06/02/2021 141 133 - 144 mmol/L Final     Potassium   Date Value Ref Range Status   06/02/2021 4.0 3.4 - 5.3 mmol/L Final     Chloride   Date Value Ref Range Status   06/02/2021 108 94 - 109 mmol/L Final     Carbon Dioxide   Date Value Ref Range Status   06/02/2021 31 20 - 32 mmol/L Final     Anion Gap   Date Value Ref Range Status   06/02/2021 2 (L) 3 - 14 mmol/L Final     Glucose   Date Value Ref Range Status   06/02/2021 107 (H) 70 - 99 mg/dL Final     Urea Nitrogen   Date Value Ref Range Status   06/02/2021 14 7 - 30 mg/dL Final     Creatinine   Date Value Ref Range Status   06/02/2021 0.71 0.52 - 1.04 mg/dL Final     GFR Estimate   Date Value Ref Range Status   06/02/2021 >90 >60 mL/min/[1.73_m2] Final     Comment:     Non  GFR Calc  Starting 12/18/2018, serum creatinine based estimated GFR (eGFR) will be   calculated using the Chronic Kidney Disease Epidemiology Collaboration   (CKD-EPI) equation.       Calcium   Date Value Ref Range Status   06/02/2021 9.5 8.5 - 10.1 mg/dL Final     Bilirubin Total   Date Value Ref Range Status   06/02/2021 1.0 0.2 - 1.3 mg/dL Final     Alkaline Phosphatase   Date Value Ref Range Status   06/02/2021 64 40 - 150 U/L Final     ALT   Date Value Ref Range Status   06/02/2021 28 0 - 50 U/L Final     AST   Date Value Ref Range Status   06/02/2021 22 0 - 45 U/L Final     Lab Results    Component Value Date    PROTTOTAL 7.8 06/02/2021     Lab Results   Component Value Date    ALBUMIN 4.3 06/02/2021     Magnesium   Date Value Ref Range Status   06/02/2021 2.3 1.6 - 2.3 mg/dL Final         Assessment:    Kayy Salazar is a 40 year old woman with a 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  She is currently undergoing adjuvant treatment with paclitaxel and carboplatin.  She presents for follow up and disease management.      49 minutes spent on the date of the encounter doing chart review, history and exam, documentation, and further activities as noted above.              Plan:     1.) Ovarian cancer:  Ok to proceed with cycle 2 of chemotherapy today as labs are WDL.  RTC in 3 weeks for labs, provider visit, and cycle 2.  This is already scheduled.  Discussed her Caris results with her on her tumor testing.  Questions answered to the best of my ability.  Encouraged to discuss this further with Dr. Ch or the genetic counselor.  Reviewed signs and symptoms for when she should contact the clinic or seek additional care.  Patient to contact the clinic with any questions or concerns in the interim.        Genetic risk factors were assessed and she does meet qualification for referral.  She met with the genetic counselor 5/19 and had her genetic testing labs drawn today.      Labs and/or tests ordered include:  CBC. CMP. Mag. .     2.) Health maintenance:  Issues addressed today include following up with PCP for annual health maintenance and non-gynecologic issues.     3.) Chemotherapy induced nausea: Encouraged to continue use of compazine every 6 hours if needed for nausea.  This can be started today if needed.  She can start zofran 3 days after chemo if still having nausea.  Encouraged alternating zofran and compazine for best effect.  Advised that even if she doesn't develop nausea right away she can still start the compazine to try to prevent it.  Continue to drink at  least 64 oz of fluid a day to prevent dehydration.  Added 1L NS bolus to day 1 of her treatment plan to try to prevent dehydration after chemo.  Encouraged small frequent meals high in protein.  She can also try protein shakes during the time she is struggling with nausea.    4.) Fatigue: Discussed that fatigue is a common, and sometimes hard to manage, symptom of chemotherapy.  Encouraged continuing to try to stay active and getting exercise with frequent rest breaks to combat this.    5.) Neuropathy: Discussed that research has not identified one good treatment for neuropathy.  However, there are some things that work well for some people that she can try.  This includes vitamin B6 50 mg BID, L-glutamine 2,000 mg BID, exercise, massage, compression, and acupuncture.  If the vitamin B6 and L-glutamine do not seem to help in a couple weeks she can stop taking these.  As her symptoms resolved will not adjust treatment plan at this point.  Advised her to notify the clinic if these symptoms worsen or do not resolve between cycles.    6.) Diarrhea/constipation: Discussed that both diarrhea and constipation are symptoms of chemotherapy.  If she is having more then 3-4 episodes of pure liquid diarrhea a day she can try Imodium 2 mg over the counter.  This can lead to constipation.  If she is having more then 5-6 pure liquid stools in 24 hrs to notify the clinic.  She can continue to manage her constipation with Senakot as she has been doing.      Bhavani Torrez, DNP, APRN, FNP-C  Nurse Practitioner   Division of Gynecologic Oncology  Pager: [phone]     CC  Patient Care Team:  [facility] as PCP - General (Clinic)  Karol Hairston MD as Assigned PCP  Luis Ch MD as MD (Gynecologic Oncology)  Karol Hairston MD as Referring Physician (Family Medicine)  Luis Ch MD as Assigned Cancer Care Provider

## 2021-05-18 ENCOUNTER — MYC MEDICAL ADVICE (OUTPATIENT)
Dept: ONCOLOGY | Facility: CLINIC | Age: 41
End: 2021-05-18

## 2021-05-19 ENCOUNTER — VIRTUAL VISIT (OUTPATIENT)
Dept: ONCOLOGY | Facility: CLINIC | Age: 41
End: 2021-05-19
Attending: GENETIC COUNSELOR, MS
Payer: COMMERCIAL

## 2021-05-19 DIAGNOSIS — Z80.42 FAMILY HISTORY OF PROSTATE CANCER: ICD-10-CM

## 2021-05-19 DIAGNOSIS — Z80.3 FAMILY HISTORY OF MALIGNANT NEOPLASM OF BREAST: ICD-10-CM

## 2021-05-19 DIAGNOSIS — C56.9 OVARIAN CANCER, UNSPECIFIED LATERALITY (H): Primary | ICD-10-CM

## 2021-05-19 DIAGNOSIS — Z80.0 FAMILY HISTORY OF STOMACH CANCER: ICD-10-CM

## 2021-05-19 DIAGNOSIS — Z80.0 FAMILY HISTORY OF PANCREATIC CANCER: ICD-10-CM

## 2021-05-19 DIAGNOSIS — Z80.6 FAMILY HISTORY OF LEUKEMIA: ICD-10-CM

## 2021-05-19 PROCEDURE — 96040 HC GENETIC COUNSELING, EACH 30 MINUTES: CPT | Mod: GT | Performed by: GENETIC COUNSELOR, MS

## 2021-05-19 NOTE — PROGRESS NOTES
5/19/2021    Kayy is a 40 year old who is being evaluated via a billable video visit.      Video-Visit Details    Type of service: Video Visit    Video Start Time: 10:15 am  Video End Time: 11:10 am    Originating Location (pt. Location): Home    Distant Location (provider location): Cancer Risk Management Program    Platform used for Video Visit: Eliza    Referring Provider: Luis Ch MD     Presenting Information:   I met with Kayy Salazar, as well as her mother, Yin, over video today for genetic counseling to discuss her recent diagnosis of ovarian cancer. She is here today to review this history and available genetic testing options. Kayy is enrolled in the [facility]    Personal History:  Kayy is a 40 year old female. 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 She had surgery on 4/6/21 and is currently having chemotherapy.       She had her first menstrual period at age 10, and her first child at age 22. She reports previous oral contraceptive use, and brief use of infertility medications. She reports that she has not used hormone replacement therapy. She has a history of endometriosis. She has not yet had a mammogram. She had a colonoscopy in 2014, which she reports was normal. She also reported a history of low ferritin. Kayy reported no history of tobacco use and no current alcohol use. She reports 1-2 drinks per night during her daughter's cancer treatment, minimal drinking before that.    Family History: (Please see scanned pedigree for detailed family history information)  Children:  Her daughter is 10 years old and was diagnosed with acute B-cell leukemia at age 8. She just finished her treatments one week ago.  Maternal:  Her mother is 62 years old with no known history of cancer. She was also present for the video today and able to help provide the family history.  Her aunt is 67 years old and was diagnosed  with leukemia (CLL) approximately 5-7 years ago. She also has a history of autoimmune hepatitis.   Her grandmother passed away at age 85 with no known history of cancer.   Her mother (Kayy's great-grandmother), as well as at least 2 of her siblings (Kayy's great-aunts/uncles) had stomach cancer. Exact ages of diagnosis unknown.  Her grandfather passed away at age 78 with no known history of cancer.   He had a sister who was diagnosed with breast cancer in her 70s and another sister who was diagnosed with stomach cancer in her 70s.  Paternal:  Her father is 65 years old with no known history of cancer.   Her uncle is 76 years old and was diagnosed with prostate cancer in his mid 60s. She reports that this was a slow growing cancer. He also has a history of skin cancer.  Her grandmother was diagnosed with pancreatic cancer at age 70 and passed away at 71. She had a history of smoking.  Her grandfather was diagnosed with leukemia in his 60s and passed away at age 65. She reported that this was attributed to exposures he had through his work in a factory.    Her maternal ethnicity is [place], [place], [place]. Her paternal ethnicity is [place], [place], possibly Polish. She reports that her paternal grandmother was Taoism (from [place]).     Discussion:    Kayy's recent diagnosis of ovarian cancer, as well as her family history of pancreatic cancer and Ashkenazi Taoism ancestry is suggestive of a possible cancer susceptibility gene in the family. Given her diagnosis of ovarian cancer, we discussed Hereditary Breast and Ovarian Cancer syndrome.    We discussed the natural history and genetics of Hereditary Breast and Ovarian cancer (HBOC) syndrome, due to BRCA1 and BRCA2 gene mutations. A single mutation in one of the copies of BRCA1 or BRCA2 increases the risk for certain cancers, including breast and ovarian. Men who carry a mutation in one of these genes are at increased risk for male breast cancer and prostate  cancer. The risk for pancreatic cancer and melanoma may also be slightly increased in some families.      Based on her personal history of ovarian cancer, Kayy meets current National Comprehensive Cancer Network (NCCN) criteria for genetic testing of BRCA1 and BRCA2. This testing is medically necessary for the medical and surgical management of her ovarian cancer.    Medical Management: Management guidelines are available for individuals who carry a pathogenic BRCA1 or BRCA2 gene mutation, developed by the National Comprehensive Cancer Network (NCCN). These recommendations may include:    additional cancer screening recommendations (i.e. mammogram and breast MRI, annual dermatologic exams, consideration of pancreatic cancer screening, etc.)     options for risk reducing surgeries (i.e. bilateral mastectomy, surgery to remove the ovaries and/or uterus, etc.) for Kayy and her relatives.      targeted chemotherapies for patients who have certain cancers now, or who develop certain cancers in the future.     these recommendations and possible targeted chemotherapies will be discussed in detail once genetic testing is completed.     We discussed that there are additional genes that could cause increased risk for ovarian cancer. As many of the genes associated with hereditary ovarian cancer present with overlapping features in a family, it would be reasonable for Kayy to consider panel genetic testing to analyze multiple genes at once.     A detailed handout regarding hereditary gynecologic cancer risk genes will be provided to Kayy via [employer] and can be found in the after visit summary. Topics included: inheritance pattern, cancer risks, cancer screening recommendations, and also risks, benefits and limitations of testing.    Kayy is enrolled in the [facility], and will be receiving genetic analysis of additional genes related to ovarian cancer as part of this program. In  addition, Kayy elected to receive secondary results, including 59 medically significant genes unrelated to hereditary ovarian cancer. This study is being conducted by Luis Ch MD, in the Department of Obstetrics, Gynecology and Women's Health at the [facility].      Consent was obtained over video today and genetic testing for BRCA1 and BRCA2 was sent to the [facility]. We discussed the benefits and limitations of pursuing additional genetic testing through the [facility]. Kayy expressed understanding, and has elected to receive all primary and secondary results with candidate variants/variants of uncertain clinical significance reported. Kayy will be contacted once results are available.          Plan:  1. Today Kayy elected to proceed with genetic testing for BRCA1 and BRCA2 through the [facility] of [facility].    2. She will have her blood draw along with her next treatment labs on 6/2. This information should be available in 4-6 weeks, and Kayy will be contacted once results are available.  3. Kayy is enrolled in the [facility] and will be receiving genetic analysis of other ovarian cancer-associated genes as part of this program.  Germline genetic test results from this study will be communicated, per patient preference, once available.      Time spent over video: 55 minutes    Jany Kimbrough MS, [employer]  Licensed, Certified Genetic Counselor  Office: [phone]  Email: [email or web address]

## 2021-05-19 NOTE — LETTER
Cancer Risk Management  Program Locations    [facility]  [facility]  [facility]  [facility]  Mailing Address  Cancer Risk Management Program  [facility]  [address]  [address]  [place] [address]    New patient appointments  [phone]  May 21, 2021    Kayy Salazar  [address] [facility] [address]      Dear Kayy,    It was a pleasure speaking with you over video for genetic counseling on 5/19/2021. Here is a copy of the progress note from our discussion. If you have any additional questions, please feel free to call.    Referring Provider: Luis Ch MD     Presenting Information:   I met with Kayy Salazar, as well as her mother, Yin, over video today for genetic counseling to discuss her recent diagnosis of ovarian cancer. She is here today to review this history and available genetic testing options. Kayy is enrolled in the [facility] in Ovarian Cancer    Personal History:  Kayy is a 40 year old female. 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 She had surgery on 4/6/21 and is currently having chemotherapy.       She had her first menstrual period at age 10, and her first child at age 22. She reports previous oral contraceptive use, and brief use of infertility medications. She reports that she has not used hormone replacement therapy. She has a history of endometriosis. She has not yet had a mammogram. She had a colonoscopy in 2014, which she reports was normal. She also reported a history of low ferritin. Kayy reported no history of tobacco use and no current alcohol use. She reports 1-2 drinks per night during her daughter's cancer treatment, minimal drinking before that.    Family History: (Please see scanned pedigree for detailed family history  information)  Children:  Her daughter is 10 years old and was diagnosed with acute B-cell leukemia at age 8. She just finished her treatments one week ago.  Maternal:  Her mother is 62 years old with no known history of cancer. She was also present for the video today and able to help provide the family history.  Her aunt is 67 years old and was diagnosed with leukemia (CLL) approximately 5-7 years ago. She also has a history of autoimmune hepatitis.   Her grandmother passed away at age 85 with no known history of cancer.   Her mother (Kayy's great-grandmother), as well as at least 2 of her siblings (Kayy's great-aunts/uncles) had stomach cancer. Exact ages of diagnosis unknown.  Her grandfather passed away at age 78 with no known history of cancer.   He had a sister who was diagnosed with breast cancer in her 70s and another sister who was diagnosed with stomach cancer in her 70s.  Paternal:  Her father is 65 years old with no known history of cancer.   Her uncle is 76 years old and was diagnosed with prostate cancer in his mid 60s. She reports that this was a slow growing cancer. He also has a history of skin cancer.  Her grandmother was diagnosed with pancreatic cancer at age 70 and passed away at 71. She had a history of smoking.  Her grandfather was diagnosed with leukemia in his 60s and passed away at age 65. She reported that this was attributed to exposures he had through his work in a factory.    Her maternal ethnicity is Sami, Bahamian, [place]ican. Her paternal ethnicity is Mozambican, Bahamian, possibly Polish. She reports that her paternal grandmother was Sikh (from [place]).     Discussion:    Kayy's recent diagnosis of ovarian cancer, as well as her family history of pancreatic cancer and Ashkenazi Sikh ancestry is suggestive of a possible cancer susceptibility gene in the family. Given her diagnosis of ovarian cancer, we discussed Hereditary Breast and Ovarian Cancer syndrome.    We discussed  the natural history and genetics of Hereditary Breast and Ovarian cancer (HBOC) syndrome, due to BRCA1 and BRCA2 gene mutations. A single mutation in one of the copies of BRCA1 or BRCA2 increases the risk for certain cancers, including breast and ovarian. Men who carry a mutation in one of these genes are at increased risk for male breast cancer and prostate cancer. The risk for pancreatic cancer and melanoma may also be slightly increased in some families.      Based on her personal history of ovarian cancer, Kayy meets current National Comprehensive Cancer Network (NCCN) criteria for genetic testing of BRCA1 and BRCA2. This testing is medically necessary for the medical and surgical management of her ovarian cancer.    Medical Management: Management guidelines are available for individuals who carry a pathogenic BRCA1 or BRCA2 gene mutation, developed by the National Comprehensive Cancer Network (NCCN). These recommendations may include:    additional cancer screening recommendations (i.e. mammogram and breast MRI, annual dermatologic exams, consideration of pancreatic cancer screening, etc.)     options for risk reducing surgeries (i.e. bilateral mastectomy, surgery to remove the ovaries and/or uterus, etc.) for Kayy and her relatives.      targeted chemotherapies for patients who have certain cancers now, or who develop certain cancers in the future.     these recommendations and possible targeted chemotherapies will be discussed in detail once genetic testing is completed.     We discussed that there are additional genes that could cause increased risk for ovarian cancer. As many of the genes associated with hereditary ovarian cancer present with overlapping features in a family, it would be reasonable for Kayy to consider panel genetic testing to analyze multiple genes at once.     A detailed handout regarding hereditary gynecologic cancer risk genes will be provided to Kayy via [employer] and can be  found in the after visit summary. Topics included: inheritance pattern, cancer risks, cancer screening recommendations, and also risks, benefits and limitations of testing.    Kayy is enrolled in the [facility] Program in Ovarian Cancer, and will be receiving genetic analysis of additional genes related to ovarian cancer as part of this program. In addition, Kayy elected to receive secondary results, including 59 medically significant genes unrelated to hereditary ovarian cancer. This study is being conducted by Luis Ch MD, in the Department of Obstetrics, Gynecology and Women's Health at the [facility].      Consent was obtained over video today and genetic testing for BRCA1 and BRCA2 was sent to the [facility]. We discussed the benefits and limitations of pursuing additional genetic testing through the [facility] Program in Ovarian Cancer. Kayy expressed understanding, and has elected to receive all primary and secondary results with candidate variants/variants of uncertain clinical significance reported. Kayy will be contacted once results are available.          Plan:  1. Today Kayy elected to proceed with genetic testing for BRCA1 and BRCA2 through the [facility] of [facility].    2. She will have her blood draw along with her next treatment labs on 6/2. This information should be available in 4-6 weeks, and Kayy will be contacted once results are available.  3. Kayy is enrolled in the [facility] Program in Ovarian Cancer and will be receiving genetic analysis of other ovarian cancer-associated genes as part of this program.  Germline genetic test results from this study will be communicated, per patient preference, once available.     Jany Kimbrough MS, [facility] [place] [facility]  Licensed, Certified Genetic Counselor  Office: [phone]  Email:  [email or web address]                                      Assessing Cancer Risk  Only about 5-10% of cancers are thought to be due to an inherited cancer susceptibility gene.    These families often have:    Several people with the same or related types of cancer    Cancers diagnosed at a young age (before age 50)    Individuals with more than one primary cancer    Multiple generations of the family affected with cancer    Some people may be candidates for genetic testing of more than one gene.  For these families, genetic testing using a cancer panel may be offered.  These panels will test different genes known to increase the risk for breast, ovarian, uterine, and/or other cancers. All of the genes discussed below have published clinical management guidelines for individuals who are found to carry a mutation. The purpose of this handout is to serve as a brief summary of the genes analyzed by the panels used to inquire about hereditary breast and gynecologic cancer:  LIMA, BRCA1, BRCA2, BRIP1, CDH1, CHEK2, MLH1, MSH2, MSH6, PMS2, EPCAM, PTEN, PALB2, RAD51C, RAD51D, and TP53.  ______________________________________________________________________________  Hereditary Breast and Ovarian Cancer Syndrome   (BRCA1 and BRCA2)  A single mutation in one of the copies of BRCA1 or BRCA2 increases the risk for breast and ovarian cancer, among others.  The risk for pancreatic cancer and melanoma may also be slightly increased in some families.  The chart below shows the chance that someone with a BRCA mutation would develop cancer in his or her lifetime1,2,3,4.        A person s ethnic background is also important to consider, as individuals of Ashkenazi Judaism ancestry have a higher chance of having a BRCA gene mutation.  There are three BRCA mutations that occur more frequently in this population.    Jurado Syndrome   (MLH1, MSH2, MSH6, PMS2, and EPCAM)  Currently five genes are known to cause Jurado Syndrome: MLH1, MSH2, MSH6,  PMS2, and EPCAM.  A single mutation in one of the Jurado Syndrome genes increases the risk for colon, endometrial, ovarian, and stomach cancers.  Other cancers that occur less commonly in Jurado Syndrome include urinary tract, skin, and brain cancers.  The chart below shows the chance that a person with Jurado syndrome would develop cancer in his or her lifetime5.      *Cancer risk varies depending on Jurado syndrome gene found    Cowden Syndrome   (PTEN)  Cowden syndrome is a hereditary condition that increases the risk for breast, thyroid, endometrial, colon, and kidney cancer.  Cowden syndrome is caused by a mutation in the PTEN gene.  A single mutation in one of the copies of PTEN causes Cowden syndrome and increases cancer risk.  The chart below shows the chance that someone with a PTEN mutation would develop cancer in their lifetime6,7.  Other benign features seen in some individuals with Cowden syndrome include benign skin lesions (facial papules, keratoses, lipomas), learning disability, autism, thyroid nodules, colon polyps, and larger head size.      *One recent study found breast cancer risk to be increased to 85%    Li-Fraumeni Syndrome   (TP53)  Li-Fraumeni Syndrome (LFS) is a cancer predisposition syndrome caused by a mutation in the TP53 gene. A single mutation in one of the copies of TP53 increases the risk for multiple cancers. Individuals with LFS are at an increased risk for developing cancer at a young age. The lifetime risk for development of a LFS-associated cancer is 50% by age 30 and 90% by age 60.   Core Cancers: Sarcomas, Breast, Brain, Lung, Leukemias/Lymphomas, Adrenocortical carcinomas  Other Cancers: Gastrointestinal, Thyroid, Skin, Genitourinary    Hereditary Diffuse Gastric Cancer   (CDH1)  Currently, one gene is known to cause hereditary diffuse gastric cancer (HDGC): CDH1.  Individuals with HDGC are at increased risk for diffuse gastric cancer and lobular breast cancer. Of people  diagnosed with HDGC, 30-50% have a mutation in the CDH1 gene.  This suggests there are likely other genes that may cause HDGC that have not been identified yet.      Lifetime Cancer Risks    General Population HDGC    Diffuse Gastric  <1% ~80%   Breast 12% 39-52%         Additional Genes  LIMA  LIMA is a moderate-risk breast cancer gene. Women who have a mutation in LIMA can have between a 2-4 fold increased risk for breast cancer compared to the general population8. LIMA mutations have also been associated with increased risk for pancreatic cancer, however an estimate of this cancer risk is not well understood9. Individuals who inherit two LIMA mutations have a condition called ataxia-telangiectasia (AT).  This rare autosomal recessive condition affects the nervous system and immune system, and is associated with progressive cerebellar ataxia beginning in childhood.  Individuals with ataxia-telangiectasia often have a weakened immune system and have an increased risk for childhood cancers.    PALB2  Mutations in PALB2 have been shown to increase the risk of breast cancer up to 33-58% in some families; where individuals fall within this risk range is dependent upon family urisrph15. PALB2 mutations have also been associated with increased risk for pancreatic cancer, although this risk has not been quantified yet.  Individuals who inherit two PALB2 mutations--one from their mother and one from their father--have a condition called Fanconi Anemia.  This rare autosomal recessive condition is associated with short stature, developmental delay, bone marrow failure, and increased risk for childhood cancers.    CHEK2   CHEK2 is a moderate-risk breast cancer gene.  Women who have a mutation in CHEK2 have around a 2-fold increased risk for breast cancer compared to the general population, and this risk may be higher depending upon family history.11,12,13 Mutations in CHEK2 have also been shown to increase the risk of a number of  other cancers, including colon and prostate, however these cancer risks are currently not well understood.    BRIP1, RAD51C and RAD51D  Mutations in BRIP1, RAD51C, and RAD51D have been shown to increase the risk of ovarian cancer and possibly female breast cancer as well14,15 .       Lifetime Cancer Risk    General Population BRIP1 RAD51C RAD51D   Ovarian 1-2% ~5-8% ~5-9% ~7-15%     Inheritance  All of the cancer syndromes reviewed above are inherited in an autosomal dominant pattern.  This means that if a parent has a mutation, each of his or her children will have a 50% chance of inheriting that same mutation.  Therefore, each child--male or female--would have a 50% chance of being at increased risk for developing cancer.      Image obtained from [employer], 2013     Mutations in some genes can occur de cristian, which means that a person s mutation occurred for the first time in them and was not inherited from a parent.  Now that they have the mutation, however, it can be passed on to future generations.    Genetic Testing  Genetic testing involves a blood test and will look at the genetic information in the LIMA, BRCA1, BRCA2, BRIP1, CDH1, CHEK2, MLH1, MSH2, MSH6, PMS2, EPCAM, PTEN, PALB2, RAD51C, RAD51D, and TP53 genes for any harmful mutations that are associated with increased cancer risk.  If possible, it is recommended that the person(s) who has had cancer be tested before other family members.  That person will give us the most useful information about whether or not a specific gene is associated with the cancer in the family.    Results  There are three possible results of genetic testing:    Positive--a harmful mutation was identified in one or more of the genes    Negative--no mutation was identified in any of the genes on this panel    Variant of unknown significance--a variation in one of the genes was identified, but it is unclear how this impacts cancer risk in the family    Advantages and  Disadvantages   There are advantages and disadvantages to genetic testing.    Advantages    May clarify your cancer risk    Can help you make medical decisions    May explain the cancers in your family    May give useful information to your family members (if you share your results)    Disadvantages    Possible negative emotional impact of learning about inherited cancer risk    Uncertainty in interpreting a negative test result in some situations    Possible genetic discrimination concerns (see below)    Genetic Information Nondiscrimination Act (RASHEED)  RASHEED is a federal law that protects individuals from health insurance or employment discrimination based on a genetic test result alone.  Although rare, there are currently no legal discrimination protections in terms of life insurance, long term care, or disability insurances.  Visit the [email or web address] [place] website to learn more.    Reducing Cancer Risk  All of the genes described above have nationally recognized cancer screening guidelines that would be recommended for individuals who test positive.  In addition to increased cancer screening, surgeries may be offered or recommended to reduce cancer risk.  Recommendations are based upon an individual s genetic test result as well as their personal and family history of cancer.    Questions to Think About Regarding Genetic Testing:    What effect will the test result have on me and my relationship with my family members if I have an inherited gene mutation?  If I don t have a gene mutation?    Should I share my test results, and how will my family react to this news, which may also affect them?    Are my children ready to learn new information that may one day affect their own health?    Hereditary Cancer Resources    FORCE: Facing Our Risk of Cancer Empowered facingourrisk.org   Bright Pink bebrightpink.org   Li-Fraumeni Syndrome Association lfsassociation.org   PTEN World [email or web address]   No  stomach for cancer, Inc. nostomacforcancer.org   Stomach cancer relief network Scrnet.org   Collaborative Group of the Americas on Inherited Colorectal Cancer (CGA) cgaicc.com    Cancer Care cancercare.org   American Cancer Society (ACS) cancer.org   National Cancer [facility] [facility] (NCI) cancer.gov     Please call us if you have any questions or concerns.   Cancer Risk Management Program [phone] ([phone])  ? Won Nye, MS, [facility] [phone]  ? Marcie Arriaza, MS, [facility]  [phone]  ? Fatemeh Dumont, MS, [facility]  [phone]  ? Kelsey Tom, MS, [facility] [phone]  ? Mohini Pride, MS, [facility] [phone]  ? Jany Kimbrough, MS, [facility]  [phone]    References  4. Abisai A, William PDP, Gino S, Dangelo WEST, Roxanna JE, Pooja JL, Jerzy N, Afsaneh H, Giovanny O, Tio A, Dorothy B, Mone P, Manblas S, Jameson DM, Jane N, Nicole E, Orquidea H, Jose David E, Hebert J, Gronjeb J, Jacques B, Steve H, Thorlacius S, Eerola H, Glo H, Shayla K, [place] OP. Average risks of breast and ovarian cancer associated with BRCA1 or BRCA2 mutations detected in case series unselected for family history: a combined analysis of 222 studies. Am J Hum Maegan. 2003;72:1117-30.  5. Ting N, Christiana M, Ainsley G.  BRCA1 and BRCA2 Hereditary Breast and Ovarian Cancer. Gene Reviews online. 2013.  6. Darian YC, Nicolas S, Kevin G, Stuart S. Breast cancer risk among male BRCA1 and BRCA2 mutation carriers. J Natl Cancer Inst. 2007;99:1811-4.  7. William VILLALTA, Kala I, Stephen J, Yolanda E, Ash ER, Sandra F. Risk of breast cancer in male BRCA2 carriers. J Med Maegan. 2010;47:710-1.  8. National Comprehensive Cancer Network. Clinical practice guidelines in oncology, colorectal cancer screening. Available online (registration required). 2015.  9. Rojelio JOHNSTON, Tammy J, Emil J, Bina LA, Jazz SOTO, Ragini C. Lifetime cancer risks in individuals with germline PTEN mutations. Clin Cancer Res. 2012;18:400-7.  10. Paul GONZALEZ. Cowden Syndrome: A  Critical Review of the Clinical Literature. J Maegan . 2009:18:13-27.  11. Lula A, Pete D, Qing S, Azra P, Vernon T, Victoriano M, Quintin B, Radha H, Karina R, Emmanuel K, Rene L, William DG, Jameson D, Matthew DF, Day MR, The Breast Cancer Susceptibility Collaboration (UK) & Eligio NIX. LIMA mutations that cause ataxia-telangiectasia are breast cancer susceptibility alleles. Nature Genetics. 2006;38:873-875  12. Kei N , Juan Diego Y, Yanna J, Arden L, Rob GM , Helga ML, Gallinger S, Edouard AG, Syngal S, Darius ML, Jhonatan J , Michelet R, Irineo SZ, Alexi JR, Brigette VE, Carlos M, Vobrijeshstein B, Rayshawn N, Abdon RH, Ifeanyi KW, and Thelma AP. LIMA mutations in patients with hereditary pancreatic cancer. Cancer Discover. 2012;2:41-46  13. Abisai HAINES, et al. Breast-Cancer Risk in Families with Mutations in PALB2. NEJM. 2014; 371(6):497-506.  14. CHEK2 Breast Cancer Case-Control Consortium. CHEK2*1100delC and susceptibility to breast cancer: A collaborative analysis involving 10,860 breast cancer cases and 9,065 controls from 10 studies. Am J Hum Maegan, 74 (2004), pp. 6304-7795  15. Antonia T, Maricarmen S, Sade K, et al. Spectrum of Mutations in BRCA1, BRCA2, CHEK2, and TP53 in Families at High Risk of Breast Cancer. PÉREZ. 2006;295(12):1187-8518.   16. Juan Francisco C, Radha D, Rosa M A, et al. Risk of breast cancer in women with a CHEK2 mutation with and without a family history of breast cancer. J Clin Oncol. 2011;29:8000-0962.  17. Heladio H, Romario E, Miko SJ, et al. Contribution of germline mutations in the RAD51B, RAD51C, and RAD51D genes to ovarian cancer in the population. J Clin Oncol. 2015;33(26):1874-5572. Doi:10.1200/JCO.2015.61.2408.  18. Annetta T, Manpreet JOEL, Fahad P, et al. Mutations in BRIP1 confer high risk of ovarian cancer. Kailee Maegan. 2011;43(11):1864-8726. doi:10.1038/ng.955.

## 2021-05-21 ENCOUNTER — PATIENT OUTREACH (OUTPATIENT)
Dept: CARE COORDINATION | Facility: CLINIC | Age: 41
End: 2021-05-21

## 2021-05-21 NOTE — PROGRESS NOTES
Clinical Product Navigator RN reviewed chart; patient on payer product coverage.  Review results: patient with newly diagnosed ovarian cancer; does have an established oncology and genetic counselor team.  Will send patient an introduction to Clinical Product Navigator role and outline clinic care coordination program.     Bibiana Joseph RN/Clinical Product Navigator

## 2021-05-21 NOTE — PATIENT INSTRUCTIONS
Assessing Cancer Risk  Only about 5-10% of cancers are thought to be due to an inherited cancer susceptibility gene.    These families often have:    Several people with the same or related types of cancer    Cancers diagnosed at a young age (before age 50)    Individuals with more than one primary cancer    Multiple generations of the family affected with cancer    Some people may be candidates for genetic testing of more than one gene.  For these families, genetic testing using a cancer panel may be offered.  These panels will test different genes known to increase the risk for breast, ovarian, uterine, and/or other cancers. All of the genes discussed below have published clinical management guidelines for individuals who are found to carry a mutation. The purpose of this handout is to serve as a brief summary of the genes analyzed by the panels used to inquire about hereditary breast and gynecologic cancer:  LIMA, BRCA1, BRCA2, BRIP1, CDH1, CHEK2, MLH1, MSH2, MSH6, PMS2, EPCAM, PTEN, PALB2, RAD51C, RAD51D, and TP53.  ______________________________________________________________________________  Hereditary Breast and Ovarian Cancer Syndrome   (BRCA1 and BRCA2)  A single mutation in one of the copies of BRCA1 or BRCA2 increases the risk for breast and ovarian cancer, among others.  The risk for pancreatic cancer and melanoma may also be slightly increased in some families.  The chart below shows the chance that someone with a BRCA mutation would develop cancer in his or her lifetime1,2,3,4.        A person s ethnic background is also important to consider, as individuals of Ashkenazi [place] ancestry have a higher chance of having a BRCA gene mutation.  There are three BRCA mutations that occur more frequently in this population.    Jurado Syndrome   (MLH1, MSH2, MSH6, PMS2, and EPCAM)  Currently five genes are known to cause Jurado Syndrome: MLH1, MSH2, MSH6, PMS2, and EPCAM.  A single mutation in one of the  Jurado Syndrome genes increases the risk for colon, endometrial, ovarian, and stomach cancers.  Other cancers that occur less commonly in Jurado Syndrome include urinary tract, skin, and brain cancers.  The chart below shows the chance that a person with Jurado syndrome would develop cancer in his or her lifetime5.      *Cancer risk varies depending on Jurado syndrome gene found    Cowden Syndrome   (PTEN)  Cowden syndrome is a hereditary condition that increases the risk for breast, thyroid, endometrial, colon, and kidney cancer.  Cowden syndrome is caused by a mutation in the PTEN gene.  A single mutation in one of the copies of PTEN causes Cowden syndrome and increases cancer risk.  The chart below shows the chance that someone with a PTEN mutation would develop cancer in their lifetime6,7.  Other benign features seen in some individuals with Cowden syndrome include benign skin lesions (facial papules, keratoses, lipomas), learning disability, autism, thyroid nodules, colon polyps, and larger head size.      *One recent study found breast cancer risk to be increased to 85%    Li-Fraumeni Syndrome   (TP53)  Li-Fraumeni Syndrome (LFS) is a cancer predisposition syndrome caused by a mutation in the TP53 gene. A single mutation in one of the copies of TP53 increases the risk for multiple cancers. Individuals with LFS are at an increased risk for developing cancer at a young age. The lifetime risk for development of a LFS-associated cancer is 50% by age 30 and 90% by age 60.   Core Cancers: Sarcomas, Breast, Brain, Lung, Leukemias/Lymphomas, Adrenocortical carcinomas  Other Cancers: Gastrointestinal, Thyroid, Skin, Genitourinary    Hereditary Diffuse Gastric Cancer   (CDH1)  Currently, one gene is known to cause hereditary diffuse gastric cancer (HDGC): CDH1.  Individuals with HDGC are at increased risk for diffuse gastric cancer and lobular breast cancer. Of people diagnosed with HDGC, 30-50% have a mutation in the CDH1  gene.  This suggests there are likely other genes that may cause HDGC that have not been identified yet.      Lifetime Cancer Risks    General Population HDGC    Diffuse Gastric  <1% ~80%   Breast 12% 39-52%         Additional Genes  LIMA  LIMA is a moderate-risk breast cancer gene. Women who have a mutation in LIMA can have between a 2-4 fold increased risk for breast cancer compared to the general population8. LIMA mutations have also been associated with increased risk for pancreatic cancer, however an estimate of this cancer risk is not well understood9. Individuals who inherit two LIMA mutations have a condition called ataxia-telangiectasia (AT).  This rare autosomal recessive condition affects the nervous system and immune system, and is associated with progressive cerebellar ataxia beginning in childhood.  Individuals with ataxia-telangiectasia often have a weakened immune system and have an increased risk for childhood cancers.    PALB2  Mutations in PALB2 have been shown to increase the risk of breast cancer up to 33-58% in some families; where individuals fall within this risk range is dependent upon family [id number]. PALB2 mutations have also been associated with increased risk for pancreatic cancer, although this risk has not been quantified yet.  Individuals who inherit two PALB2 mutations--one from their mother and one from their father--have a condition called Fanconi Anemia.  This rare autosomal recessive condition is associated with short stature, developmental delay, bone marrow failure, and increased risk for childhood cancers.    CHEK2   CHEK2 is a moderate-risk breast cancer gene.  Women who have a mutation in CHEK2 have around a 2-fold increased risk for breast cancer compared to the general population, and this risk may be higher depending upon family history.11,12,13 Mutations in CHEK2 have also been shown to increase the risk of a number of other cancers, including colon and prostate, however  these cancer risks are currently not well understood.    BRIP1, RAD51C and RAD51D  Mutations in BRIP1, RAD51C, and RAD51D have been shown to increase the risk of ovarian cancer and possibly female breast cancer as well14,15 .       Lifetime Cancer Risk    General Population BRIP1 RAD51C RAD51D   Ovarian 1-2% ~5-8% ~5-9% ~7-15%           Inheritance  All of the cancer syndromes reviewed above are inherited in an autosomal dominant pattern.  This means that if a parent has a mutation, each of his or her children will have a 50% chance of inheriting that same mutation.  Therefore, each child--male or female--would have a 50% chance of being at increased risk for developing cancer.      Image obtained from Genetics Home Reference, 2013     Mutations in some genes can occur de cristian, which means that a person s mutation occurred for the first time in them and was not inherited from a parent.  Now that they have the mutation, however, it can be passed on to future generations.    Genetic Testing  Genetic testing involves a blood test and will look at the genetic information in the LIMA, BRCA1, BRCA2, BRIP1, CDH1, CHEK2, MLH1, MSH2, MSH6, PMS2, EPCAM, PTEN, PALB2, RAD51C, RAD51D, and TP53 genes for any harmful mutations that are associated with increased cancer risk.  If possible, it is recommended that the person(s) who has had cancer be tested before other family members.  That person will give us the most useful information about whether or not a specific gene is associated with the cancer in the family.    Results  There are three possible results of genetic testing:    Positive--a harmful mutation was identified in one or more of the genes    Negative--no mutation was identified in any of the genes on this panel    Variant of unknown significance--a variation in one of the genes was identified, but it is unclear how this impacts cancer risk in the family    Advantages and Disadvantages   There are advantages and  disadvantages to genetic testing.    Advantages    May clarify your cancer risk    Can help you make medical decisions    May explain the cancers in your family    May give useful information to your family members (if you share your results)    Disadvantages    Possible negative emotional impact of learning about inherited cancer risk    Uncertainty in interpreting a negative test result in some situations    Possible genetic discrimination concerns (see below)    Genetic Information Nondiscrimination Act (RASHEED)  RASHEED is a federal law that protects individuals from health insurance or employment discrimination based on a genetic test result alone.  Although rare, there are currently no legal discrimination protections in terms of life insurance, long term care, or disability insurances.  Visit the [employer] website to learn more.    Reducing Cancer Risk  All of the genes described above have nationally recognized cancer screening guidelines that would be recommended for individuals who test positive.  In addition to increased cancer screening, surgeries may be offered or recommended to reduce cancer risk.  Recommendations are based upon an individual s genetic test result as well as their personal and family history of cancer.    Questions to Think About Regarding Genetic Testing:    What effect will the test result have on me and my relationship with my family members if I have an inherited gene mutation?  If I don t have a gene mutation?    Should I share my test results, and how will my family react to this news, which may also affect them?    Are my children ready to learn new information that may one day affect their own health?    Hereditary Cancer Resources    FORCE: Facing Our Risk of Cancer Empowered facingourrisk.org   Bright Pink bebrightpink.org   Li-Fraumeni Syndrome Association lfsassociation.org   PTEN World PTENworld.com   No stomach for cancer, Inc.  nostomachforcancer.org   Stomach cancer relief network Scrnet.org   Collaborative Group of the Americas on Inherited Colorectal Cancer (CGA) cgaicc.com    Cancer Care cancercare.org   American Cancer Society (ACS) cancer.org   National Cancer [place] (NCI) cancer.gov     Please call us if you have any questions or concerns.   Cancer Risk Management Program [phone] ([phone])  ? Won Nye, MS, [facility] [phone]  ? Marcie Arriaza, MS, [facility]  [phone]  ? Fatemeh Dumont, MS, [facility]  [phone]  ? Kelsey Santos, MS, [facility] [phone]  ? Mohini Shannen, MS, [facility] [phone]  ? Jany Kimbrough, MS, [facility]  [phone]    References  1. Abisai A, William PDP, Gino S, Dangelo WEST, Roxanna JE, Pooja JL, Jerzy N, Afsaneh H, Giovanny O, Tio A, Dorothy B, Mone P, Manblas S, Jameson DM, Jane N, Nicole E, Orquidea H, Jose David E, Hebert J, Gronjeb J, Jacques B, Steve H, Thorlacius S, Eerola H, Glo H, Shayla K, [place] OP. Average risks of breast and ovarian cancer associated with BRCA1 or BRCA2 mutations detected in case series unselected for family history: a combined analysis of 222 studies. Am J Hum Maegan. 2003;72:1117-30.  2. Ting NIX, Christiana M, Ainsley G.  BRCA1 and BRCA2 Hereditary Breast and Ovarian Cancer. Gene Reviews online. 2013.  3. Darian YC, Nicolas S, Kevin G, Stuart S. Breast cancer risk among male BRCA1 and BRCA2 mutation carriers. J Natl Cancer Inst. 2007;99:1811-4.  4. William VILLALTA, Kala I, Stephen J, Yolanda E, Ash ER, Sandra F. Risk of breast cancer in male BRCA2 carriers. J Med Maegan. 2010;47:710-1.  5. National Comprehensive Cancer Network. Clinical practice guidelines in oncology, colorectal cancer screening. Available online (registration required). 2015.  6. Rojelio JOHNSTON, Tammy J, Emil J, Bina LA, Jazz SOTO, Ragini C. Lifetime cancer risks in individuals with germline PTEN mutations. Clin Cancer Res. 2012;18:400-7.  7. Paul GONZALEZ. Cowden Syndrome: A Critical Review of the  Clinical Literature. J Maegan . 2009:18:13-27.  8. Lula A, Pete D, Qing S, Azra P, Vernon T, Victoriano M, Quintin B, Radha H, Karina R, Emmanuel K, Rene L, William DG, Jameson D, Matthew DF, Day MR, The Breast Cancer Susceptibility Collaboration () & Eligio NIX. LIMA mutations that cause ataxia-telangiectasia are breast cancer susceptibility alleles. Nature Genetics. 2006;38:873-875  9. Kei N , Juan Diego Y, Yanna J, Arden L, Rob GM , Helga ML, Gallinger S, Edouard AG, Syngal S, Darius ML, Jhonatan J , Michelet R, Irineo SZ, Alexi JR, Brigette VE, Carlos M, Vosocorro B, Rayshawn N, Abdon RH, Ifeanyi KW, and Thelma AP. LIMA mutations in patients with hereditary pancreatic cancer. Cancer Discover. 2012;2:41-46  10. Abisai HAINES, et al. Breast-Cancer Risk in Families with Mutations in PALB2. NEJM. 2014; 371(6):497-506.  11. CHEK2 Breast Cancer Case-Control Consortium. CHEK2*1100delC and susceptibility to breast cancer: A collaborative analysis involving 10,860 breast cancer cases and 9,065 controls from 10 studies. Am J Hum Maegan, 74 (2004), pp. 3011-4200  12. Antonia T, Maricarmen S, Sade K, et al. Spectrum of Mutations in BRCA1, BRCA2, CHEK2, and TP53 in Families at High Risk of Breast Cancer. PÉREZ. 2006;295(12):0181-9079.   13. Juan Francisco C, Radha D, Rosa M A, et al. Risk of breast cancer in women with a CHEK2 mutation with and without a family history of breast cancer. J Clin Oncol. 2011;29:2439-7302.  14. Heladio H, Romario E, Miko SJ, et al. Contribution of germline mutations in the RAD51B, RAD51C, and RAD51D genes to ovarian cancer in the population. J Clin Oncol. 2015;33(26):0233-1157. Doi:10.1200/JCO.2015.61.2408.  15. Annetta T, Manpreet JOEL, Fahad P, et al. Mutations in BRIP1 confer high risk of ovarian cancer. Kailee Maegan. 2011;43(11):9180-4208. doi:10.1038/ng.955.

## 2021-05-27 PROBLEM — Z51.11 ENCOUNTER FOR ANTINEOPLASTIC CHEMOTHERAPY: Status: ACTIVE | Noted: 2021-05-27

## 2021-06-02 ENCOUNTER — INFUSION THERAPY VISIT (OUTPATIENT)
Dept: INFUSION THERAPY | Facility: CLINIC | Age: 41
End: 2021-06-02
Payer: COMMERCIAL

## 2021-06-02 ENCOUNTER — HOSPITAL ENCOUNTER (OUTPATIENT)
Facility: CLINIC | Age: 41
Discharge: HOME | End: 2021-06-02
Attending: NURSE PRACTITIONER | Admitting: OBSTETRICS & GYNECOLOGY
Payer: COMMERCIAL

## 2021-06-02 ENCOUNTER — ONCOLOGY VISIT (OUTPATIENT)
Dept: ONCOLOGY | Facility: CLINIC | Age: 41
End: 2021-06-02
Payer: COMMERCIAL

## 2021-06-02 VITALS
TEMPERATURE: 98.3 F | HEART RATE: 75 BPM | DIASTOLIC BLOOD PRESSURE: 66 MMHG | RESPIRATION RATE: 14 BRPM | OXYGEN SATURATION: 100 % | SYSTOLIC BLOOD PRESSURE: 100 MMHG | BODY MASS INDEX: 18.38 KG/M2 | WEIGHT: 100.5 LBS

## 2021-06-02 DIAGNOSIS — Z51.11 ENCOUNTER FOR ANTINEOPLASTIC CHEMOTHERAPY: ICD-10-CM

## 2021-06-02 DIAGNOSIS — C56.9 OVARIAN CANCER, UNSPECIFIED LATERALITY (H): Primary | ICD-10-CM

## 2021-06-02 DIAGNOSIS — Z51.11 ENCOUNTER FOR ANTINEOPLASTIC CHEMOTHERAPY: Primary | ICD-10-CM

## 2021-06-02 DIAGNOSIS — Z80.0 FAMILY HISTORY OF STOMACH CANCER: ICD-10-CM

## 2021-06-02 DIAGNOSIS — Z80.0 FAMILY HISTORY OF PANCREATIC CANCER: ICD-10-CM

## 2021-06-02 DIAGNOSIS — C56.9 OVARIAN CANCER, UNSPECIFIED LATERALITY (H): ICD-10-CM

## 2021-06-02 DIAGNOSIS — Z80.6 FAMILY HISTORY OF LEUKEMIA: ICD-10-CM

## 2021-06-02 DIAGNOSIS — Z80.3 FAMILY HISTORY OF MALIGNANT NEOPLASM OF BREAST: ICD-10-CM

## 2021-06-02 DIAGNOSIS — Z80.42 FAMILY HISTORY OF PROSTATE CANCER: ICD-10-CM

## 2021-06-02 LAB
ALBUMIN SERPL-MCNC: 4.3 G/DL (ref 3.4–5)
ALP SERPL-CCNC: 64 U/L (ref 40–150)
ALT SERPL W P-5'-P-CCNC: 28 U/L (ref 0–50)
ANION GAP SERPL CALCULATED.3IONS-SCNC: 2 MMOL/L (ref 3–14)
AST SERPL W P-5'-P-CCNC: 22 U/L (ref 0–45)
BASOPHILS # BLD AUTO: 0.1 10E9/L (ref 0–0.2)
BASOPHILS NFR BLD AUTO: 1.2 %
BILIRUB SERPL-MCNC: 1 MG/DL (ref 0.2–1.3)
BUN SERPL-MCNC: 14 MG/DL (ref 7–30)
CALCIUM SERPL-MCNC: 9.5 MG/DL (ref 8.5–10.1)
CANCER AG125 SERPL-ACNC: 8 U/ML (ref 0–30)
CHLORIDE SERPL-SCNC: 108 MMOL/L (ref 94–109)
CO2 SERPL-SCNC: 31 MMOL/L (ref 20–32)
CREAT SERPL-MCNC: 0.71 MG/DL (ref 0.52–1.04)
DIFFERENTIAL METHOD BLD: ABNORMAL
EOSINOPHIL # BLD AUTO: 0.1 10E9/L (ref 0–0.7)
EOSINOPHIL NFR BLD AUTO: 2.7 %
ERYTHROCYTE [DISTWIDTH] IN BLOOD BY AUTOMATED COUNT: 17.8 % (ref 10–15)
GFR SERPL CREATININE-BSD FRML MDRD: >90 ML/MIN/{1.73_M2}
GLUCOSE SERPL-MCNC: 107 MG/DL (ref 70–99)
HCT VFR BLD AUTO: 37.3 % (ref 35–47)
HGB BLD-MCNC: 11.8 G/DL (ref 11.7–15.7)
IMM GRANULOCYTES # BLD: 0 10E9/L (ref 0–0.4)
IMM GRANULOCYTES NFR BLD: 0.4 %
LYMPHOCYTES # BLD AUTO: 2 10E9/L (ref 0.8–5.3)
LYMPHOCYTES NFR BLD AUTO: 38.8 %
MAGNESIUM SERPL-MCNC: 2.3 MG/DL (ref 1.6–2.3)
MCH RBC QN AUTO: 26 PG (ref 26.5–33)
MCHC RBC AUTO-ENTMCNC: 31.6 G/DL (ref 31.5–36.5)
MCV RBC AUTO: 82 FL (ref 78–100)
MONOCYTES # BLD AUTO: 0.3 10E9/L (ref 0–1.3)
MONOCYTES NFR BLD AUTO: 5.2 %
NEUTROPHILS # BLD AUTO: 2.7 10E9/L (ref 1.6–8.3)
NEUTROPHILS NFR BLD AUTO: 51.7 %
PLATELET # BLD AUTO: 153 10E9/L (ref 150–450)
POTASSIUM SERPL-SCNC: 4 MMOL/L (ref 3.4–5.3)
PROT SERPL-MCNC: 7.8 G/DL (ref 6.8–8.8)
RBC # BLD AUTO: 4.54 10E12/L (ref 3.8–5.2)
SODIUM SERPL-SCNC: 141 MMOL/L (ref 133–144)
WBC # BLD AUTO: 5.2 10E9/L (ref 4–11)

## 2021-06-02 PROCEDURE — 86304 IMMUNOASSAY TUMOR CA 125: CPT | Performed by: NURSE PRACTITIONER

## 2021-06-02 PROCEDURE — 96367 TX/PROPH/DG ADDL SEQ IV INF: CPT | Performed by: NURSE PRACTITIONER

## 2021-06-02 PROCEDURE — S0028 INJECTION, FAMOTIDINE, 20 MG: HCPCS | Performed by: NURSE PRACTITIONER

## 2021-06-02 PROCEDURE — 36415 COLL VENOUS BLD VENIPUNCTURE: CPT | Performed by: NURSE PRACTITIONER

## 2021-06-02 PROCEDURE — 96417 CHEMO IV INFUS EACH ADDL SEQ: CPT | Performed by: NURSE PRACTITIONER

## 2021-06-02 PROCEDURE — 83735 ASSAY OF MAGNESIUM: CPT | Performed by: NURSE PRACTITIONER

## 2021-06-02 PROCEDURE — 999N001104 HC STATISTIC DNA ISOL HIGH PURITY: Performed by: OBSTETRICS & GYNECOLOGY

## 2021-06-02 PROCEDURE — 96415 CHEMO IV INFUSION ADDL HR: CPT | Performed by: NURSE PRACTITIONER

## 2021-06-02 PROCEDURE — 99215 OFFICE O/P EST HI 40 MIN: CPT | Mod: 25 | Performed by: NURSE PRACTITIONER

## 2021-06-02 PROCEDURE — 99207 PR NO CHARGE LOS: CPT

## 2021-06-02 PROCEDURE — 96375 TX/PRO/DX INJ NEW DRUG ADDON: CPT | Performed by: NURSE PRACTITIONER

## 2021-06-02 PROCEDURE — 85025 COMPLETE CBC W/AUTO DIFF WBC: CPT | Performed by: NURSE PRACTITIONER

## 2021-06-02 PROCEDURE — 80053 COMPREHEN METABOLIC PANEL: CPT | Performed by: NURSE PRACTITIONER

## 2021-06-02 PROCEDURE — 96413 CHEMO IV INFUSION 1 HR: CPT | Performed by: NURSE PRACTITIONER

## 2021-06-02 RX ORDER — NALOXONE HYDROCHLORIDE 0.4 MG/ML
.1-.4 INJECTION, SOLUTION INTRAMUSCULAR; INTRAVENOUS; SUBCUTANEOUS
Status: CANCELLED | OUTPATIENT
Start: 2021-06-02

## 2021-06-02 RX ORDER — MEPERIDINE HYDROCHLORIDE 25 MG/ML
25 INJECTION INTRAMUSCULAR; INTRAVENOUS; SUBCUTANEOUS EVERY 30 MIN PRN
Status: CANCELLED | OUTPATIENT
Start: 2021-06-02

## 2021-06-02 RX ORDER — EPINEPHRINE 1 MG/ML
0.3 INJECTION, SOLUTION INTRAMUSCULAR; SUBCUTANEOUS EVERY 5 MIN PRN
Status: CANCELLED | OUTPATIENT
Start: 2021-06-02

## 2021-06-02 RX ORDER — HEPARIN SODIUM,PORCINE 10 UNIT/ML
5 VIAL (ML) INTRAVENOUS
Status: CANCELLED | OUTPATIENT
Start: 2021-06-02

## 2021-06-02 RX ORDER — DIPHENHYDRAMINE HCL 25 MG
50 CAPSULE ORAL ONCE
Status: COMPLETED | OUTPATIENT
Start: 2021-06-02 | End: 2021-06-02

## 2021-06-02 RX ORDER — HEPARIN SODIUM (PORCINE) LOCK FLUSH IV SOLN 100 UNIT/ML 100 UNIT/ML
5 SOLUTION INTRAVENOUS
Status: DISCONTINUED | OUTPATIENT
Start: 2021-06-02 | End: 2021-06-02 | Stop reason: HOSPADM

## 2021-06-02 RX ORDER — HEPARIN SODIUM (PORCINE) LOCK FLUSH IV SOLN 100 UNIT/ML 100 UNIT/ML
5 SOLUTION INTRAVENOUS
Status: CANCELLED | OUTPATIENT
Start: 2021-06-02

## 2021-06-02 RX ORDER — PROCHLORPERAZINE MALEATE 10 MG
10 TABLET ORAL EVERY 6 HOURS PRN
Qty: 30 TABLET | Refills: 5 | Status: SHIPPED | OUTPATIENT
Start: 2021-06-02 | End: 2021-07-21

## 2021-06-02 RX ORDER — PALONOSETRON 0.05 MG/ML
0.25 INJECTION, SOLUTION INTRAVENOUS ONCE
Status: COMPLETED | OUTPATIENT
Start: 2021-06-02 | End: 2021-06-02

## 2021-06-02 RX ORDER — SODIUM CHLORIDE 9 MG/ML
1000 INJECTION, SOLUTION INTRAVENOUS CONTINUOUS PRN
Status: CANCELLED
Start: 2021-06-02

## 2021-06-02 RX ORDER — PALONOSETRON 0.05 MG/ML
0.25 INJECTION, SOLUTION INTRAVENOUS ONCE
Status: CANCELLED
Start: 2021-06-02

## 2021-06-02 RX ORDER — DIPHENHYDRAMINE HCL 25 MG
50 CAPSULE ORAL ONCE
Status: CANCELLED
Start: 2021-06-02

## 2021-06-02 RX ORDER — METHYLPREDNISOLONE SODIUM SUCCINATE 125 MG/2ML
125 INJECTION, POWDER, LYOPHILIZED, FOR SOLUTION INTRAMUSCULAR; INTRAVENOUS
Status: CANCELLED
Start: 2021-06-02

## 2021-06-02 RX ORDER — ALBUTEROL SULFATE 90 UG/1
1-2 AEROSOL, METERED RESPIRATORY (INHALATION)
Status: CANCELLED
Start: 2021-06-02

## 2021-06-02 RX ORDER — ALBUTEROL SULFATE 0.83 MG/ML
2.5 SOLUTION RESPIRATORY (INHALATION)
Status: CANCELLED | OUTPATIENT
Start: 2021-06-02

## 2021-06-02 RX ORDER — DIPHENHYDRAMINE HYDROCHLORIDE 50 MG/ML
50 INJECTION INTRAMUSCULAR; INTRAVENOUS
Status: CANCELLED
Start: 2021-06-02

## 2021-06-02 RX ADMIN — PALONOSETRON 0.25 MG: 0.05 INJECTION, SOLUTION INTRAVENOUS at 09:40

## 2021-06-02 RX ADMIN — Medication 1000 ML: at 09:34

## 2021-06-02 RX ADMIN — Medication 50 MG: at 09:32

## 2021-06-02 ASSESSMENT — PAIN SCALES - GENERAL: PAINLEVEL: NO PAIN (0)

## 2021-06-02 NOTE — NURSING NOTE
"Oncology Rooming Note    June 2, 2021 8:45 AM   Kayy Salazar is a 40 year old female who presents for:    Chief Complaint   Patient presents with     Oncology Clinic Visit     Follow up     Initial Vitals: /66 (BP Location: Left arm, Patient Position: Sitting)   Pulse 75   Temp 98.3  F (36.8  C) (Oral)   Resp 14   Wt 45.6 kg (100 lb 8 oz)   LMP 03/11/2021   SpO2 100%   BMI 18.38 kg/m   Estimated body mass index is 18.38 kg/m  as calculated from the following:    Height as of 4/6/21: 1.575 m (5' 2\").    Weight as of this encounter: 45.6 kg (100 lb 8 oz). Body surface area is 1.41 meters squared.  No Pain (0) Comment: Data Unavailable   Patient's last menstrual period was 03/11/2021.  Allergies reviewed: Yes  Medications reviewed: Yes    Medications: MEDICATION REFILLS NEEDED TODAY. Provider was notified.  Pharmacy name entered into [facility]:    [id number] [place], [place] - [address] [address] [place] [address] [facility] [place], [place] - [address] [id number]    Clinical concerns: none       Misty Nelson CMA            "

## 2021-06-02 NOTE — PROGRESS NOTES
Infusion Nursing Note:  Kayy Salazar presents today for C2D1 Taxol/Carboplatin.    Patient seen by provider today: Yes: Bhavani Torrez CNP   present during visit today: Not Applicable.    Note: N/A.  Patient declined the covid-19 test.    Intravenous Access:  Implanted Port.    Treatment Conditions:  Lab Results   Component Value Date    HGB 11.8 06/02/2021     Lab Results   Component Value Date    WBC 5.2 06/02/2021      Lab Results   Component Value Date    ANEU 2.7 06/02/2021     Lab Results   Component Value Date     06/02/2021      Lab Results   Component Value Date     06/02/2021                   Lab Results   Component Value Date    POTASSIUM 4.0 06/02/2021           Lab Results   Component Value Date    MAG 2.3 06/02/2021            Lab Results   Component Value Date    CR 0.71 06/02/2021                   Lab Results   Component Value Date    SEBAS 9.5 06/02/2021                Lab Results   Component Value Date    BILITOTAL 1.0 06/02/2021           Lab Results   Component Value Date    ALBUMIN 4.3 06/02/2021                    Lab Results   Component Value Date    ALT 28 06/02/2021           Lab Results   Component Value Date    AST 22 06/02/2021       Results reviewed, labs MET treatment parameters, ok to proceed with treatment.      Post Infusion Assessment:  Patient tolerated infusion without incident.       Discharge Plan:   Patient discharged in stable condition accompanied by: self.      Layla Abbott RN

## 2021-06-02 NOTE — PATIENT INSTRUCTIONS
For your neuropathy you can try:      Exercise    Message    Vitamin B6 50 mg twice a day    L-glutamine 2000 mg twice a day, can be purchased at [employer] or [employer], or [employer] but tabs are in lower doses    Acupuncture and laser treatments with a program such as [facility].

## 2021-06-02 NOTE — LETTER
2021         RE: Kayy Salazar  [address] [place] [address]        Dear Colleague,    Thank you for referring your patient, Kayy Salazar, to the [facility]. Please see a copy of my visit note below.    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  She is currently undergoing adjuvant treatment with paclitaxel and carboplatin.  She presents for follow up and disease management.        Oncology History:  Initially, she was seen for vomiting and nausea, reduced oral intake, some urinary frequency with abdominal distention over a couple months.  She also lost about 8 pounds in the last couple of weeks.     3/17/2021:  191.  CT AP  IMPRESSION:   1.  Left adnexal soft tissue mass associated with large volume ascites  and foci of peritoneal thickening is suspicious for ovarian/peritoneal  malignancy. Recommend gyne-oncology consultation.  2.  4.1 cm posterior uterine mass is likely a fibroid. Enhancing  nodule on the surface of the uterine fundus could be a subserosal  fibroid or peritoneal deposit.  3.  Focal ill-defined hypoenhancement in the liver, near the falciform  ligament, could be due to focal fatty infiltration versus peritoneal  deposit. Enhancing lesion in segment 8 is favored to be a hemangioma  but needs confirmation with MRI.  4.  Subcentimeter pelvic lymph nodes are indeterminate.  [Access Center: Left adnexal soft tissue mass associated with large  volume ascites and foci of peritoneal thickening is suspicious for  ovarian/peritoneal malignancy. Recommend gyne-oncology consultation.    3/19/21: CT chest  IMPRESSION:   1. No pulmonary nodules or lymphadenopathy.  2. Area of bony sclerosis in the sternum, concern for  metastatic  Disease.    3/19/21: MRI abdomen  IMPRESSION: In this patient with concern for left ovarian neoplastic  lesion, the current MRI scan shows:  1. Multiple hepatic lesions, including:-  a. Indeterminate segment 6 subcapsular lesion, noting lack of  retention on the hepatobiliary phase and T2 hyperintensity.  Possibility of a sclerosing hemangioma should be considered along with  possibility of small metastatic lesion (considered less likely). If  this subcapsular lesion is not evaluated in the operating room as part  of staging procedure, recommend three-month follow-up MRI exam using  Eovist as intravenous contrast.  b. Hypodensity along the falciform ligament seen on recent CT exam has  been characterized by this MRI exam as focal fatty infiltration.  c. Multiple benign lesions, including at least 3 focal nodular  hyperplasias and hemangioma.  2. Moderate to large volume ascites with mild omental thickening,  presumably malignant in the setting of ovarian malignancy. This is  better characterized on CT abdomen and pelvis exam.  3. Layering sludge in the gallbladder.    4/1/21: CEA <0.5. CA 19-9 9.    4/6/21:  Diagnostic laparoscopy with biopsies, converted to exploratory laparotomy, modified radical hysterectomy, bilateral salpingo-oophorectomy, bilateral ureterolysis, bladder peritonectomy, omentectomy, diaphragm stripping, liver mobilization, colon mobilization, small bowel serosa oversewing, resection of abdominal wall nodule  Partial hepatectomy performed by Surgical Oncology (Dr. Zuñiga)   Optimal debulking to no residual macroscopic residual disease R0  FINAL DIAGNOSIS:   A. OVARY, RIGHT, BIOPSY:   - Serous borderline tumor   B. NODULE, CUL DE SAC, EXCISION:   - Positive for serous carcinoma, low grade   C. OMENTUM, BIOPSY:   - Positive for serous carcinoma   D. OVARY AND FALLOPIAN TUBE, LEFT, SALPINGO-OOPHORECTOMY:   - Serous carcinoma of the ovary, low grade on a background of a borderline     tumor   - Multiple follicular cysts   - Fallopian tube with serosal involvement by low grade serous carcinoma   E. UTERUS, CERVIX, RIGHT FALLOPIAN TUBE AND OVARY, HYSTERECTOMY WITH RIGHT    SALPINGO-OOPHORECTOMY:   - Secretory-type endometrium   - Leiomyomas and one adenomyoma   - Uterine serosal involvement by low grade serous carcinoma,   endometriosis/endosalpingiosis and fibrous   adhesions   - Ovary with surface serous borderline tumor and fibrous adhesions   - Fallopian tube serosal inflammation and adhesions   F. NODULE, CUL DE SAC, EXCISION #2:   - Serosal hyperplasia and calcification   - Fibrous adhesions   - Negative for malignancy   G. NODULE, LEFT PELVIC SIDEWALL, EXCISION:   - Fibroadipose tissue, positive for low grade serous carcinoma   H. FALCIFORM LIGAMENT, BIOPSY:   - Fibroadipose tissue with endosalpingiosis   - Negative for malignancy   I. OMENTUM, OMENTECTOMY:   - Omental adipose tissue, positive for low grade serous carcinoma   (non-invasive implants)   - Omental lymph node positive for metastatic low grade serous carcinoma,   and endosalpingiosis   J. NODULE, RIGHT LOWER QUADRANT ABDOMINAL WALL, EXCISION:   - Fibroadipose tissue, positive for low grade serous carcinoma   K. RIGHT EVERETT-DIAPHRAGM PERITONEUM, EXCISION:   - Fibroadipose tissue, positive for low grade serous carcinoma   - Endosalpingiosis, calcifications and fibrous adhesions   L. NODULE, LIVER SURFACE, EXCISION:   - Hepatic parenchyma and capsular endosalpingiosis   - Negative for malignancy   M. NODULE, SMALL BOWEL SEROSAL, EXCISION:   - Serosa with inflammation, necrosis and mesothelial hyperplasia   - Negative for malignancy   N. NODULE, SMALL BOWEL MESENTERY, EXCISION:   - Fibroadipose tissue, positive for low grade serous carcinoma   - Serosa with inflammation, necrosis and mesothelial hyperplasia   O. NODULE, SMALL BOWEL MESENTERY, EXCISION #2:   - Serosa with inflammation, necrosis and mesothelial hyperplasia   P.  LIVER, PARTIAL LEFT HEPATECTOMY LEVEL 3:   - Focal nodular hyperplasia, FNH; negative for malignancy     (with typical trichrome stain and map-like pattern on glutamine   synthetase immunohistochemistry, with   appropriate controls)     Plan: Carboplatin AUC 6 and paclitaxel 175 mg/m2 x 6 cycles.    5/10/21: Cycle 1 carboplatin and paclitaxel.   14.  6/2/21: Cycle 2 carboplatin and paclitaxel.   pending.                Today she comes to clinic feeling well overall.  She did have some significant nausea for about 5 days after chemo.  No vomiting.  She was able to manage this with compazine every 6 hours.  She needs a refill.  She was able to continue to drink fluids without difficulty but was only able to tolerate a few bites of food at a time.  She has zofran at home but didn't take this as she was told she shouldn't.  She lost a few pounds during this time but gained it back quickly when she felt better.  She also had some diarrhea for the first 3 days then developed constipation.  The diarrhea was 3-4 episodes a day of mostly pure liquid stools.  Her constipation lasted 2 days and improved with Senokot.  She had fatigue for about 5 days and was significant enough where she needed to lay on the couch during the day then improved to the point she was able to go for a run.  She developed some numbness and tingling in her palms and finger that started about a week and a half ago and has now resolved.  She does take a vitamin B6 supplement. She denies any vaginal bleeding, no changes in her bladder habits, no emesis, no lower extremity edema, and no abdominal discomfort/bloating, no fevers or chills, and no chest pain or shortness of breath.  She has some questions about the genetic testing on her tumor.          Review of Systems:    Systemic           +weight loss; no fever; no chills; no night sweats; +decreased appetite  Skin           no rashes, or lesions  Eye           no irritation; no changes in  vision  Tiffany-Laryngeal           no dysphagia; no hoarseness   Pulmonary    no cough; no shortness of breath  Cardiovascular    no chest pain; no palpitations  Gastrointestinal    + diarrhea; + constipation; no abdominal pain; + changes in bowel habits; no blood in stool  Genitourinary   no urinary frequency; no urinary urgency; no dysuria; no pain; no abnormal vaginal discharge; no abnormal vaginal bleeding  Breast   no breast discharge; no breast changes; no breast pain  Musculoskeletal    no myalgias; no arthralgias; no back pain  Psychiatric           no depressed mood; no anxiety    Hematologic   no tender lymph nodes; no noticeable swellings or lumps   Endocrine    no hot flashes; no heat/cold intolerance         Neurological   no tremor; + numbness and tingling; no headaches; no difficulty sleeping      Past Medical History:    Past Medical History:   Diagnosis Date     Endometriosis          Past Surgical History:    Past Surgical History:   Procedure Laterality Date     GYN SURGERY      c section      HC TOOTH EXTRACTION W/FORCEP       HEPATECTOMY PARTIAL  4/6/2021    Procedure: Partial left hepatectomy level 3;  Surgeon: Chris Zuñiga MD;  Location: [facility] OR     LAPAROSCOPIC ABLATION ENDOMETRIOSIS      2007     LAPAROSCOPY DIAGNOSTIC (GYN) N/A 4/6/2021    Procedure: Diagnostic laparoscopy with biopsies converted to exploratory laparotmy, modified radical hysterectomy bilateral salpingo-oophorectomy, bilateral ureterolysis, peritonectomy, omentectomy, resection of liver nodule, diapraghm stripping, liver mobilization, colon mobilization, resection of abdominal wall nodule, tumor debulking 2R0, no macroscopic residual disease;  Surgeon: Ben Ch         Health Maintenance Due   Topic Date Due     PREVENTIVE CARE VISIT  Never done     ADVANCE CARE PLANNING  Never done     Pneumococcal Vaccine: Pediatrics (0 to 5 Years) and At-Risk Patients (6 to 64 Years) (1 of 4 - PCV13) Never done     COVID-19  Vaccine (1) Never done     HIV SCREENING  Never done     HEPATITIS C SCREENING  Never done     DTAP/TDAP/TD IMMUNIZATION (1 - Tdap) Never done     PAP  03/01/2016       Current Medications:     Current Outpatient Medications   Medication Sig Dispense Refill     senna-docusate (SENOKOT-S/PERICOLACE) 8.6-50 MG tablet Take 2 tablets by mouth 2 times daily as needed for constipation 60 tablet 0     acetaminophen (TYLENOL) 325 MG tablet Take 2 tablets (650 mg) by mouth every 6 hours 60 tablet 0     ibuprofen (ADVIL/MOTRIN) 600 MG tablet Take 1 tablet (600 mg) by mouth every 6 hours as needed for moderate pain (Patient not taking: Reported on 6/2/2021) 60 tablet 0     oxyCODONE (ROXICODONE) 5 MG tablet Take 1 tablet (5 mg) by mouth every 12 hours as needed for severe pain (Patient not taking: Reported on 5/6/2021) 10 tablet 0     polyethylene glycol (MIRALAX) 17 g packet Take 17 g by mouth 2 times daily as needed for constipation (Patient not taking: Reported on 5/6/2021) 10 packet 1     prochlorperazine (COMPAZINE) 10 MG tablet Take 1 tablet (10 mg) by mouth every 6 hours as needed (nausea/vomiting) (Patient not taking: Reported on 6/2/2021) 30 tablet 5         Allergies:        Allergies   Allergen Reactions     Amoxicillin Hives     Full body hives after amoxacillin & Vicodin after wisdom teeth pulled when around 27 yo.     Vicodin [Hydrocodone-Acetaminophen] Rash        Social History:     Social History     Tobacco Use     Smoking status: Never Smoker     Smokeless tobacco: Never Used   Substance Use Topics     Alcohol use: No       History   Drug Use No         Family History:     The patient's family history is notable for:    Family History   Problem Relation Age of Onset     Blood Disease Mother         hemachromatosis      Diabetes Maternal Grandmother      Alzheimer Disease Maternal Grandmother      Multiple Sclerosis Maternal Grandmother      Pancreatic Cancer Paternal Grandmother      Leukemia Paternal  Grandfather      Anesthesia Reaction No family hx of      Deep Vein Thrombosis (DVT) No family hx of          Physical Exam:     /66 (BP Location: Left arm, Patient Position: Sitting)   Pulse 75   Temp 98.3  F (36.8  C) (Oral)   Resp 14   Wt 45.6 kg (100 lb 8 oz)   LMP 03/11/2021   SpO2 100%   BMI 18.38 kg/m    Body mass index is 18.38 kg/m .    General Appearance: healthy and alert, no distress     HEENT: no palpable nodules or masses        Cardiovascular: regular rate and rhythm, no gallops, rubs or murmurs     Respiratory: lungs clear, no rales, rhonchi or wheezes    Musculoskeletal: extremities non tender and without edema    Skin: no lesions or rashes     Neurological: normal gait, no gross defects     Psychiatric: appropriate mood and affect                               Hematological: normal cervical and supraclavicular inguinal lymph nodes     Gastrointestinal:       abdomen soft, non-tender, non-distended    Genitourinary: Deferred.    Lab Results   Component Value Date    WBC 5.2 06/02/2021     Lab Results   Component Value Date    RBC 4.54 06/02/2021     Lab Results   Component Value Date    HGB 11.8 06/02/2021     Lab Results   Component Value Date    HCT 37.3 06/02/2021     No components found for: MCT  Lab Results   Component Value Date    MCV 82 06/02/2021     Lab Results   Component Value Date    MCH 26.0 06/02/2021     Lab Results   Component Value Date    MCHC 31.6 06/02/2021     Lab Results   Component Value Date    RDW 17.8 06/02/2021     Lab Results   Component Value Date     06/02/2021     % Neutrophils   Date Value Ref Range Status   06/02/2021 51.7 % Final     % Lymphocytes   Date Value Ref Range Status   06/02/2021 38.8 % Final     % Monocytes   Date Value Ref Range Status   06/02/2021 5.2 % Final     % Eosinophils   Date Value Ref Range Status   06/02/2021 2.7 % Final     % Basophils   Date Value Ref Range Status   06/02/2021 1.2 % Final     % Immature Granulocytes    Date Value Ref Range Status   06/02/2021 0.4 % Final     Absolute Neutrophil   Date Value Ref Range Status   06/02/2021 2.7 1.6 - 8.3 10e9/L Final     Absolute Lymphocytes   Date Value Ref Range Status   06/02/2021 2.0 0.8 - 5.3 10e9/L Final     Absolute Monocytes   Date Value Ref Range Status   06/02/2021 0.3 0.0 - 1.3 10e9/L Final     Absolute Eosinophils   Date Value Ref Range Status   06/02/2021 0.1 0.0 - 0.7 10e9/L Final     Absolute Basophils   Date Value Ref Range Status   06/02/2021 0.1 0.0 - 0.2 10e9/L Final     Abs Immature Granulocytes   Date Value Ref Range Status   06/02/2021 0.0 0 - 0.4 10e9/L Final     Nucleated RBCs   Date Value Ref Range Status   03/20/2021 0 0 /100 Final     Absolute Nucleated RBC   Date Value Ref Range Status   03/20/2021 0.0  Final     Last Comprehensive Metabolic Panel:  Sodium   Date Value Ref Range Status   06/02/2021 141 133 - 144 mmol/L Final     Potassium   Date Value Ref Range Status   06/02/2021 4.0 3.4 - 5.3 mmol/L Final     Chloride   Date Value Ref Range Status   06/02/2021 108 94 - 109 mmol/L Final     Carbon Dioxide   Date Value Ref Range Status   06/02/2021 31 20 - 32 mmol/L Final     Anion Gap   Date Value Ref Range Status   06/02/2021 2 (L) 3 - 14 mmol/L Final     Glucose   Date Value Ref Range Status   06/02/2021 107 (H) 70 - 99 mg/dL Final     Urea Nitrogen   Date Value Ref Range Status   06/02/2021 14 7 - 30 mg/dL Final     Creatinine   Date Value Ref Range Status   06/02/2021 0.71 0.52 - 1.04 mg/dL Final     GFR Estimate   Date Value Ref Range Status   06/02/2021 >90 >60 mL/min/[1.73_m2] Final     Comment:     Non  GFR Calc  Starting 12/18/2018, serum creatinine based estimated GFR (eGFR) will be   calculated using the Chronic Kidney Disease Epidemiology Collaboration   (CKD-EPI) equation.       Calcium   Date Value Ref Range Status   06/02/2021 9.5 8.5 - 10.1 mg/dL Final     Bilirubin Total   Date Value Ref Range Status   06/02/2021 1.0 0.2  - 1.3 mg/dL Final     Alkaline Phosphatase   Date Value Ref Range Status   06/02/2021 64 40 - 150 U/L Final     ALT   Date Value Ref Range Status   06/02/2021 28 0 - 50 U/L Final     AST   Date Value Ref Range Status   06/02/2021 22 0 - 45 U/L Final     Lab Results   Component Value Date    PROTTOTAL 7.8 06/02/2021     Lab Results   Component Value Date    ALBUMIN 4.3 06/02/2021     Magnesium   Date Value Ref Range Status   06/02/2021 2.3 1.6 - 2.3 mg/dL Final         Assessment:    Kayy Salazar is a 40 year old woman with a 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  She is currently undergoing adjuvant treatment with paclitaxel and carboplatin.  She presents for follow up and disease management.      49 minutes spent on the date of the encounter doing chart review, history and exam, documentation, and further activities as noted above.              Plan:     1.) Ovarian cancer:  Ok to proceed with cycle 2 of chemotherapy today as labs are WDL.  RTC in 3 weeks for labs, provider visit, and cycle 2.  This is already scheduled.  Discussed her Caris results with her on her tumor testing.  Questions answered to the best of my ability.  Encouraged to discuss this further with Dr. Ch or the genetic counselor.  Reviewed signs and symptoms for when she should contact the clinic or seek additional care.  Patient to contact the clinic with any questions or concerns in the interim.        Genetic risk factors were assessed and she does meet qualification for referral.  She met with the genetic counselor 5/19 and had her genetic testing labs drawn today.      Labs and/or tests ordered include:  CBC. CMP. Mag. .     2.) Health maintenance:  Issues addressed today include following up with PCP for annual health maintenance and non-gynecologic issues.     3.) Chemotherapy induced nausea: Encouraged to continue use of compazine every 6 hours if needed for nausea.  This can be started today if needed.   She can start zofran 3 days after chemo if still having nausea.  Encouraged alternating zofran and compazine for best effect.  Advised that even if she doesn't develop nausea right away she can still start the compazine to try to prevent it.  Continue to drink at least 64 oz of fluid a day to prevent dehydration.  Added 1L NS bolus to day 1 of her treatment plan to try to prevent dehydration after chemo.  Encouraged small frequent meals high in protein.  She can also try protein shakes during the time she is struggling with nausea.    4.) Fatigue: Discussed that fatigue is a common, and sometimes hard to manage, symptom of chemotherapy.  Encouraged continuing to try to stay active and getting exercise with frequent rest breaks to combat this.    5.) Neuropathy: Discussed that research has not identified one good treatment for neuropathy.  However, there are some things that work well for some people that she can try.  This includes vitamin B6 50 mg BID, L-glutamine 2,000 mg BID, exercise, massage, compression, and acupuncture.  If the vitamin B6 and L-glutamine do not seem to help in a couple weeks she can stop taking these.  As her symptoms resolved will not adjust treatment plan at this point.  Advised her to notify the clinic if these symptoms worsen or do not resolve between cycles.    6.) Diarrhea/constipation: Discussed that both diarrhea and constipation are symptoms of chemotherapy.  If she is having more then 3-4 episodes of pure liquid diarrhea a day she can try Imodium 2 mg over the counter.  This can lead to constipation.  If she is having more then 5-6 pure liquid stools in 24 hrs to notify the clinic.  She can continue to manage her constipation with Senakot as she has been doing.      Bhavani Torrez, DNP, APRN, FNP-C  Nurse Practitioner   Division of Gynecologic Oncology  Pager: [phone]     CC  Patient Care Team:  [facility] as PCP - General ([facility])  Karol Hairston MD as  Assigned PCP  Luis Ch MD as MD (Gynecologic Oncology)  Karol Hairston MD as Referring Physician (Family Medicine)  Luis Ch MD as Assigned Cancer Care Provider          Again, thank you for allowing me to participate in the care of your patient.        Sincerely,        PAOLO Wasserman CNP

## 2021-06-03 NOTE — TELEPHONE ENCOUNTER
Notified Kayy that we received prior authorization approval for her genetic testing. Valid from 5/25/2021 to 5/24/2021. Authorization number is [id number].     Explained that insurance benefits may still apply, therefore, there could be an out of pocket cost.    Kayy expressed understanding and stated that she wants to proceed with testing. We will call when results are available. Kayy had no further questions.    LEW Marroquin  Genomics Billing    [facility]   Molecular Diagnostics Laboratory  [address] [place] [id number] [phone]

## 2021-06-04 LAB — COPATH REPORT: NORMAL

## 2021-06-07 ENCOUNTER — TELEPHONE (OUTPATIENT)
Dept: CONSULT | Facility: CLINIC | Age: 41
End: 2021-06-07

## 2021-06-07 ENCOUNTER — MYC REFILL (OUTPATIENT)
Dept: ONCOLOGY | Facility: CLINIC | Age: 41
End: 2021-06-07

## 2021-06-07 DIAGNOSIS — C56.9 OVARIAN CANCER, UNSPECIFIED LATERALITY (H): Primary | ICD-10-CM

## 2021-06-07 DIAGNOSIS — Z80.6 FAMILY HISTORY OF LEUKEMIA: ICD-10-CM

## 2021-06-07 DIAGNOSIS — Z80.0 FAMILY HISTORY OF STOMACH CANCER: ICD-10-CM

## 2021-06-07 DIAGNOSIS — Z98.890 POSTOPERATIVE STATE: ICD-10-CM

## 2021-06-07 DIAGNOSIS — Z80.42 FAMILY HISTORY OF PROSTATE CANCER: ICD-10-CM

## 2021-06-07 DIAGNOSIS — Z80.0 FAMILY HISTORY OF PANCREATIC CANCER: ICD-10-CM

## 2021-06-07 DIAGNOSIS — Z80.3 FAMILY HISTORY OF MALIGNANT NEOPLASM OF BREAST: ICD-10-CM

## 2021-06-07 PROCEDURE — G0452 MOLECULAR PATHOLOGY INTERPR: HCPCS | Mod: 26 | Performed by: PATHOLOGY

## 2021-06-07 PROCEDURE — 81162 BRCA1&2 GEN FULL SEQ DUP/DEL: CPT | Performed by: OBSTETRICS & GYNECOLOGY

## 2021-06-07 RX ORDER — AMOXICILLIN 250 MG
2 CAPSULE ORAL 2 TIMES DAILY PRN
Qty: 60 TABLET | Refills: 0 | Status: SHIPPED | OUTPATIENT
Start: 2021-06-07

## 2021-06-07 NOTE — TELEPHONE ENCOUNTER
Notified Kayy that we received prior authorization approval for genetic testing. Valid from 5/25/2021 to 5/24/2021 . Authorization number is [id number].     Explained that insurance benefits may still apply, therefore, there could be an out of pocket cost.    Kayy expressed understanding and stated that she wants to proceed with testing. We will call when results are available. Kayy had no further questions.    LEW Marroquin  Genomics Billing    [facility]   Molecular Diagnostics Laboratory  [address] [place] [address] [id number] [phone]

## 2021-06-08 NOTE — PROGRESS NOTES
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  She is currently undergoing adjuvant treatment with paclitaxel and carboplatin.  She presents for follow up and disease management.          Oncology History:  Initially, she was seen for vomiting and nausea, reduced oral intake, some urinary frequency with abdominal distention over a couple months.  She also lost about 8 pounds in the last couple of weeks.      3/17/2021:  191.  CT AP  IMPRESSION:   1.  Left adnexal soft tissue mass associated with large volume ascites  and foci of peritoneal thickening is suspicious for ovarian/peritoneal  malignancy. Recommend gyne-oncology consultation.  2.  4.1 cm posterior uterine mass is likely a fibroid. Enhancing  nodule on the surface of the uterine fundus could be a subserosal  fibroid or peritoneal deposit.  3.  Focal ill-defined hypoenhancement in the liver, near the falciform  ligament, could be due to focal fatty infiltration versus peritoneal  deposit. Enhancing lesion in segment 8 is favored to be a hemangioma  but needs confirmation with MRI.  4.  Subcentimeter pelvic lymph nodes are indeterminate.  [Access Center: Left adnexal soft tissue mass associated with large  volume ascites and foci of peritoneal thickening is suspicious for  ovarian/peritoneal malignancy. Recommend gyne-oncology consultation.     3/19/21: CT chest  IMPRESSION:   1. No pulmonary nodules or lymphadenopathy.  2. Area of bony sclerosis in the sternum, concern for metastatic  Disease.     3/19/21: MRI abdomen  IMPRESSION: In this patient with concern for left ovarian neoplastic  lesion, the current MRI scan shows:  1. Multiple hepatic lesions, including:-  a. Indeterminate segment 6 subcapsular lesion, noting lack  of  retention on the hepatobiliary phase and T2 hyperintensity.  Possibility of a sclerosing hemangioma should be considered along with  possibility of small metastatic lesion (considered less likely). If  this subcapsular lesion is not evaluated in the operating room as part  of staging procedure, recommend three-month follow-up MRI exam using  Eovist as intravenous contrast.  b. Hypodensity along the falciform ligament seen on recent CT exam has  been characterized by this MRI exam as focal fatty infiltration.  c. Multiple benign lesions, including at least 3 focal nodular  hyperplasias and hemangioma.  2. Moderate to large volume ascites with mild omental thickening,  presumably malignant in the setting of ovarian malignancy. This is  better characterized on CT abdomen and pelvis exam.  3. Layering sludge in the gallbladder.     4/1/21: CEA <0.5. CA 19-9 9.     4/6/21:  Diagnostic laparoscopy with biopsies, converted to exploratory laparotomy, modified radical hysterectomy, bilateral salpingo-oophorectomy, bilateral ureterolysis, bladder peritonectomy, omentectomy, diaphragm stripping, liver mobilization, colon mobilization, small bowel serosa oversewing, resection of abdominal wall nodule  Partial hepatectomy performed by Surgical Oncology (Dr. Zuñiga)   Optimal debulking to no residual macroscopic residual disease R0  FINAL DIAGNOSIS:   A. OVARY, RIGHT, BIOPSY:   - Serous borderline tumor   B. NODULE, CUL DE SAC, EXCISION:   - Positive for serous carcinoma, low grade   C. OMENTUM, BIOPSY:   - Positive for serous carcinoma   D. OVARY AND FALLOPIAN TUBE, LEFT, SALPINGO-OOPHORECTOMY:   - Serous carcinoma of the ovary, low grade on a background of a borderline    tumor   - Multiple follicular cysts   - Fallopian tube with serosal involvement by low grade serous carcinoma   E. UTERUS, CERVIX, RIGHT FALLOPIAN TUBE AND OVARY, HYSTERECTOMY WITH RIGHT    SALPINGO-OOPHORECTOMY:   - Secretory-type endometrium   -  Leiomyomas and one adenomyoma   - Uterine serosal involvement by low grade serous carcinoma,   endometriosis/endosalpingiosis and fibrous   adhesions   - Ovary with surface serous borderline tumor and fibrous adhesions   - Fallopian tube serosal inflammation and adhesions   F. NODULE, CUL DE SAC, EXCISION #2:   - Serosal hyperplasia and calcification   - Fibrous adhesions   - Negative for malignancy   G. NODULE, LEFT PELVIC SIDEWALL, EXCISION:   - Fibroadipose tissue, positive for low grade serous carcinoma   H. FALCIFORM LIGAMENT, BIOPSY:   - Fibroadipose tissue with endosalpingiosis   - Negative for malignancy   I. OMENTUM, OMENTECTOMY:   - Omental adipose tissue, positive for low grade serous carcinoma   (non-invasive implants)   - Omental lymph node positive for metastatic low grade serous carcinoma,   and endosalpingiosis   J. NODULE, RIGHT LOWER QUADRANT ABDOMINAL WALL, EXCISION:   - Fibroadipose tissue, positive for low grade serous carcinoma   K. RIGHT EVERETT-DIAPHRAGM PERITONEUM, EXCISION:   - Fibroadipose tissue, positive for low grade serous carcinoma   - Endosalpingiosis, calcifications and fibrous adhesions   L. NODULE, LIVER SURFACE, EXCISION:   - Hepatic parenchyma and capsular endosalpingiosis   - Negative for malignancy   M. NODULE, SMALL BOWEL SEROSAL, EXCISION:   - Serosa with inflammation, necrosis and mesothelial hyperplasia   - Negative for malignancy   N. NODULE, SMALL BOWEL MESENTERY, EXCISION:   - Fibroadipose tissue, positive for low grade serous carcinoma   - Serosa with inflammation, necrosis and mesothelial hyperplasia   O. NODULE, SMALL BOWEL MESENTERY, EXCISION #2:   - Serosa with inflammation, necrosis and mesothelial hyperplasia   P. LIVER, PARTIAL LEFT HEPATECTOMY LEVEL 3:   - Focal nodular hyperplasia, FNH; negative for malignancy     (with typical trichrome stain and map-like pattern on glutamine   synthetase immunohistochemistry, with   appropriate controls)      Plan: Carboplatin  "AUC 6 and paclitaxel 175 mg/m2 x 6 cycles.     5/10/21: Cycle 1 carboplatin and paclitaxel.   14.  6/2/21: Cycle 2 carboplatin and paclitaxel.   8.  6/23/21: Cycle 3 carboplatin and paclitaxel.   pending.                Today she comes to clinic feeling well overall.  She did have some nausea after her last cycle but was able to \"stay ahead of it\" with compazine.  She did lose a couple pounds during this time but improved once she started to feel better.  She also had some constipation for 3-5 days which she managed with Senakot 2 tabs twice per day.  She was able to continue to drink well.  She had fatigue that lasted for about 5 days.  Her biggest issue was bone pain which she managed with Tylenol.  She denies any changes in her bladder habits, no emesis, no lower extremity edema, and no  fevers or chills, and no chest pain or shortness of breath.                           Review of Systems:    Systemic           +weight loss; no fever; no chills; no night sweats; + decreased appetite; +fatigue  Skin           no rashes, or lesions  Eye           no irritation; no changes in vision  Tiffany-Laryngeal           no dysphagia; no hoarseness   Pulmonary    no cough; no shortness of breath  Cardiovascular    no chest pain; no palpitations  Gastrointestinal    no diarrhea; + constipation; no abdominal pain; + changes in bowel habits; no blood in stool  Genitourinary   no urinary frequency; no urinary urgency; no dysuria; no pain; no abnormal vaginal discharge; no abnormal vaginal bleeding  Breast   no breast discharge; no breast changes; no breast pain  Musculoskeletal    + myalgias; + arthralgias; no back pain  Psychiatric           no depressed mood; no anxiety    Hematologic   no tender lymph nodes; no noticeable swellings or lumps   Endocrine    no hot flashes; no heat/cold intolerance         Neurological   no tremor; no numbness and tingling; no headaches; no difficulty sleeping      Past Medical " History:    Past Medical History:   Diagnosis Date     Endometriosis          Past Surgical History:    Past Surgical History:   Procedure Laterality Date     GYN SURGERY      c section      HC TOOTH EXTRACTION W/FORCEP       HEPATECTOMY PARTIAL  4/6/2021    Procedure: Partial left hepatectomy level 3;  Surgeon: Chris Zuñiga MD;  Location: [facility] OR     LAPAROSCOPIC ABLATION ENDOMETRIOSIS      2007     LAPAROSCOPY DIAGNOSTIC (GYN) N/A 4/6/2021    Procedure: Diagnostic laparoscopy with biopsies converted to exploratory laparotmy, modified radical hysterectomy bilateral salpingo-oophorectomy, bilateral ureterolysis, peritonectomy, omentectomy, resection of liver nodule, diapraghm stripping, liver mobilization, colon mobilization, resection of abdominal wall nodule, tumor debulking 2R0, no macroscopic residual disease;  Surgeon: Ben Ch         Health Maintenance Due   Topic Date Due     PREVENTIVE CARE VISIT  Never done     ADVANCE CARE PLANNING  Never done     Pneumococcal Vaccine: Pediatrics (0 to 5 Years) and At-Risk Patients (6 to 64 Years) (1 of 4 - PCV13) Never done     COVID-19 Vaccine (1) Never done     HIV SCREENING  Never done     HEPATITIS C SCREENING  Never done     DTAP/TDAP/TD IMMUNIZATION (1 - Tdap) Never done     PAP  03/01/2016       Current Medications:     Current Outpatient Medications   Medication Sig Dispense Refill     acetaminophen (TYLENOL) 325 MG tablet Take 2 tablets (650 mg) by mouth every 6 hours 60 tablet 0     prochlorperazine (COMPAZINE) 10 MG tablet Take 1 tablet (10 mg) by mouth every 6 hours as needed (nausea/vomiting) 30 tablet 5     senna-docusate (SENOKOT-S/PERICOLACE) 8.6-50 MG tablet Take 2 tablets by mouth 2 times daily as needed for constipation 60 tablet 0         Allergies:        Allergies   Allergen Reactions     Amoxicillin Hives     Full body hives after amoxacillin & Vicodin after wisdom teeth pulled when around 29 yo.     Vicodin [Hydrocodone-Acetaminophen]  "Rash        Social History:     Social History     Tobacco Use     Smoking status: Never Smoker     Smokeless tobacco: Never Used   Substance Use Topics     Alcohol use: No       History   Drug Use No         Family History:     The patient's family history is notable for:    Family History   Problem Relation Age of Onset     Blood Disease Mother         hemachromatosis      Diabetes Maternal Grandmother      Alzheimer Disease Maternal Grandmother      Multiple Sclerosis Maternal Grandmother      Pancreatic Cancer Paternal Grandmother      Leukemia Paternal Grandfather      Anesthesia Reaction No family hx of      Deep Vein Thrombosis (DVT) No family hx of          Physical Exam:     /71 (BP Location: Left arm, Patient Position: Chair, Cuff Size: Adult Regular)   Pulse 71   Temp 98.7  F (37.1  C) (Oral)   Ht 1.575 m (5' 2\")   Wt 46.3 kg (102 lb)   LMP 03/11/2021   SpO2 100%   BMI 18.66 kg/m    Body mass index is 18.66 kg/m .    General Appearance: healthy and alert, no distress     HEENT: no palpable nodules or masses        Cardiovascular: regular rate and rhythm, no gallops, rubs or murmurs     Respiratory: lungs clear, no rales, rhonchi or wheezes    Musculoskeletal: extremities non tender and without edema    Skin: no lesions or rashes     Neurological: normal gait, no gross defects     Psychiatric: appropriate mood and affect                               Hematological: normal cervical and supraclavicular     Gastrointestinal:       abdomen soft, non-tender, non-distended    Genitourinary: Deferred.    Lab Results   Component Value Date    WBC 3.2 06/23/2021     Lab Results   Component Value Date    RBC 3.89 06/23/2021     Lab Results   Component Value Date    HGB 10.6 06/23/2021     Lab Results   Component Value Date    HCT 32.5 06/23/2021     No components found for: MCT  Lab Results   Component Value Date    MCV 84 06/23/2021     Lab Results   Component Value Date    MCH 27.2 06/23/2021     Lab " Results   Component Value Date    MCHC 32.6 06/23/2021     Lab Results   Component Value Date    RDW 16.9 06/23/2021     Lab Results   Component Value Date     06/23/2021     % Neutrophils   Date Value Ref Range Status   06/23/2021 35.0 % Final     % Lymphocytes   Date Value Ref Range Status   06/23/2021 57.0 % Final     % Monocytes   Date Value Ref Range Status   06/23/2021 6.0 % Final     % Eosinophils   Date Value Ref Range Status   06/23/2021 2.0 % Final     % Basophils   Date Value Ref Range Status   06/02/2021 1.2 % Final     % Immature Granulocytes   Date Value Ref Range Status   06/02/2021 0.4 % Final     Absolute Neutrophil   Date Value Ref Range Status   06/23/2021 1.1 (L) 1.6 - 8.3 10e9/L Final     Absolute Lymphocytes   Date Value Ref Range Status   06/23/2021 1.8 0.8 - 5.3 10e9/L Final     Absolute Monocytes   Date Value Ref Range Status   06/23/2021 0.2 0.0 - 1.3 10e9/L Final     Absolute Eosinophils   Date Value Ref Range Status   06/23/2021 0.1 0.0 - 0.7 10e9/L Final     Absolute Basophils   Date Value Ref Range Status   06/02/2021 0.1 0.0 - 0.2 10e9/L Final     Abs Immature Granulocytes   Date Value Ref Range Status   06/02/2021 0.0 0 - 0.4 10e9/L Final     Nucleated RBCs   Date Value Ref Range Status   03/20/2021 0 0 /100 Final     Absolute Nucleated RBC   Date Value Ref Range Status   03/20/2021 0.0  Final       Last Comprehensive Metabolic Panel:  Sodium   Date Value Ref Range Status   06/23/2021 141 133 - 144 mmol/L Final     Potassium   Date Value Ref Range Status   06/23/2021 3.7 3.4 - 5.3 mmol/L Final     Chloride   Date Value Ref Range Status   06/23/2021 108 94 - 109 mmol/L Final     Carbon Dioxide   Date Value Ref Range Status   06/23/2021 27 20 - 32 mmol/L Final     Anion Gap   Date Value Ref Range Status   06/23/2021 6 3 - 14 mmol/L Final     Glucose   Date Value Ref Range Status   06/23/2021 94 70 - 99 mg/dL Final     Urea Nitrogen   Date Value Ref Range Status   06/23/2021 13 7  - 30 mg/dL Final     Creatinine   Date Value Ref Range Status   06/23/2021 0.73 0.52 - 1.04 mg/dL Final     GFR Estimate   Date Value Ref Range Status   06/23/2021 >90 >60 mL/min/[1.73_m2] Final     Comment:     Non  GFR Calc  Starting 12/18/2018, serum creatinine based estimated GFR (eGFR) will be   calculated using the Chronic Kidney Disease Epidemiology Collaboration   (CKD-EPI) equation.       Calcium   Date Value Ref Range Status   06/23/2021 9.0 8.5 - 10.1 mg/dL Final     Bilirubin Total   Date Value Ref Range Status   06/23/2021 0.9 0.2 - 1.3 mg/dL Final     Alkaline Phosphatase   Date Value Ref Range Status   06/23/2021 59 40 - 150 U/L Final     ALT   Date Value Ref Range Status   06/23/2021 27 0 - 50 U/L Final     AST   Date Value Ref Range Status   06/23/2021 21 0 - 45 U/L Final     Lab Results   Component Value Date    ALBUMIN 3.9 06/23/2021     Lab Results   Component Value Date    PROTTOTAL 7.2 06/23/2021     Magnesium   Date Value Ref Range Status   06/23/2021 2.1 1.6 - 2.3 mg/dL Final         Assessment:    Kayy Salazar is a 40 year old woman with a 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  She is currently undergoing adjuvant treatment with paclitaxel and carboplatin.  She presents for follow up and disease management.         40 minutes spent on the date of the encounter doing chart review, history and exam, documentation, and further activities as noted above.      Plan:     1.)        Ovarian cancer:  Will need to defer chemotherapy today due to neutropenia.  Ok to proceed with cycle 3 of chemotherapy next week if labs are WDL.  RTC in 3 weeks after cycle 3 for labs, provider visit, and cycle 4.  Reviewed signs and symptoms for when she should contact the clinic or seek additional care.  Patient to contact the clinic with any questions or concerns in the interim.        Genetic risk factors were assessed and she was negative for mutations in BRCA 1/2.       Labs  and/or tests ordered include:  CBC. CMP. Mag. .                2.)        Health maintenance:  Issues addressed today include following up with PCP for annual health maintenance and non-gynecologic issues.      3.)        Chemotherapy induced nausea: Encouraged to continue use of compazine every 6 hours if needed for nausea.  She can take zofran 3 days after chemo if still having nausea.  Continue to drink at least 64 oz of fluid a day to prevent dehydration.  Encouraged small frequent meals high in protein.       4.)        Fatigue: Discussed that fatigue is a common, and sometimes hard to manage, symptom of chemotherapy.  Encouraged continuing to try to stay active and getting exercise with frequent rest breaks to combat this.     5.)        Constipation:  She can continue to manage her constipation with Senakot 2 tabs twice per day as she has been doing.  Also discussed adding Miralax daily after chemo if needed.    6.) Arthralgias:  Discussed that bone pain is a common symptoms after taxol. She can manage this with tylenol as needed and occasional ibuprofen but would not advise regular ibuprofen use.  She can also start plain Claritin daily which is usually helpful.    7.) Neutropenia:  Today her ANC was 1.1.  Hold chemo today as above.  Will add Neulasta on pro to her treatment plan to try to prevent neutropenia in the future.  Discussed that this can increase bone pain and to treat this as above.      Bhavani Torrez, DNP, APRN, FNP-C  Nurse Practitioner  Division of Gynecologic Oncology  Pager: [phone]     CC  Patient Care Team:  [facility] as PCP - General (Clinic)  Karol Hairston MD as Assigned PCP  Luis Ch MD as MD (Gynecologic Oncology)  Karol Hairston MD as Referring Physician (Family Medicine)  Luis Ch MD as Assigned Cancer Care Provider

## 2021-06-22 ENCOUNTER — VIRTUAL VISIT (OUTPATIENT)
Dept: ONCOLOGY | Facility: CLINIC | Age: 41
End: 2021-06-22
Attending: GENETIC COUNSELOR, MS
Payer: COMMERCIAL

## 2021-06-22 DIAGNOSIS — C56.9 OVARIAN CANCER, UNSPECIFIED LATERALITY (H): Primary | ICD-10-CM

## 2021-06-22 PROCEDURE — 999N000069 HC STATISTIC GENETIC COUNSELING, < 16 MIN: Mod: GT | Performed by: GENETIC COUNSELOR, MS

## 2021-06-22 NOTE — PROGRESS NOTES
"June 22, 2021    Kayy is a 40 year old who is being evaluated via a billable video visit.      Video-Visit Details    Type of service: Video Visit    Video Start Time: 03:39 pm  Video End Time: 03:42 pm    Originating Location (pt. Location): Home    Distant Location (provider location): Cancer Risk Management Program    Platform used for Video Visit: Eliza    Referring Provider: Luis Ch MD     Presenting Information:   I spoke with Kayy over video to discuss her genetic testing results. Her blood was drawn on 6/2/21. BRCA1/2 testing was ordered from the [facility]. This testing was done because of her recent diagnosis of ovarian cancer.    Genetic Testing Result: NEGATIVE  Kayy is negative for mutations in BRCA1 and BRCA2. She does not have an identifiable mutation associated with Hereditary Breast and Ovarian Cancer Syndrome.     A copy of the test report can be found in the Laboratory tab, dated 6/7/21, and named \"Hereditary cancer BRCA1/BRCA2\".     Interpretation:  We discussed several different interpretations of this negative test result.    1. One explanation may be that there is a different gene or combination of genes and environment that are associated with the cancers in Kayy and/or her relatives.    2. It is possible that one of her relatives has a mutation in either BRCA1 or BRCA2 and she did not inherit it.  3. There is also a small possibility that there is a mutation in one of these genes, and the testing laboratory could not find it with their current testing methods.          Screening:  Based on this negative test result, it is important for Kayy and her relatives to refer back to the family history for appropriate cancer screening. Complete cancer screening recommendations for Kayy and her relatives will be discussed once we have the additional genetic testing results available from the [facility] in " Ovarian Cancer.      Inheritance:  Mutations in BRCA1 and BRCA2 are inherited in an autosomal dominant manner. Kayy cannot pass on an identifiable mutation in these genes to her children based on this negative test result. Mutations in these genes do not skip generations.       Additional Testing:  It is possible Kayy does carry a gene or combination of genes and environment that increase her risk for ovarian cancer. Kayy is enrolled in the [facility] in Ovarian Cancer and will be receiving genetic analysis of other ovarian cancer-associated genes as part of this program. These results will most likely be available in the next couple of months. Screening and management recommendations for Kayy and her relatives may change based on these results.      Summary:  We do not have an explanation for Kayy's ovarian cancer at this time. Because of that, it is important that she continue with cancer screening based on her personal and family history as discussed above.     Plan:  1. She has access to her test results through "[employer]".  2. I will contact Kayy once the additional genetic analysis results are available from the [facility] in Ovarian Cancer.     Jany Kimbrough MS, [facility]  Licensed, Certified Genetic Counselor  Office: [phone]  Email: [email or web address]

## 2021-06-22 NOTE — LETTER
Cancer Risk Management  Program Locations    [facility]  [facility]  [facility]  [facility]  Mailing Address  Cancer Risk Management Program  [facility]  [address]  [facility] [address]  [place] [address]    New patient appointments  [phone]  June 25, 2021    Kayy Salazar  [address] [facility] [address]      Dear Kayy,    It was a pleasure speaking with you over video for genetic counseling on 6/22/21. Here is a copy of the progress note from our discussion. If you have any additional questions, please feel free to call.    Referring Provider: Luis Ch MD     Presenting Information:   I spoke with Kayy over video to discuss her genetic testing results. Her blood was drawn on 6/2/21. BRCA1/2 testing was ordered from the [facility]. This testing was done because of her recent diagnosis of ovarian cancer.    Genetic Testing Result: NEGATIVE  Kayy is negative for mutations in BRCA1 and BRCA2. She does not have an identifiable mutation associated with Hereditary Breast and Ovarian Cancer Syndrome.    Interpretation:  We discussed several different interpretations of this negative test result.    1. One explanation may be that there is a different gene or combination of genes and environment that are associated with the cancers in Kayy and/or her relatives.    2. It is possible that one of her relatives has a mutation in either BRCA1 or BRCA2 and she did not inherit it.  3. There is also a small possibility that there is a mutation in one of these genes, and the testing laboratory could not find it with their current testing methods.          Screening:  Based on this negative test result, it is important for Kayy and her relatives to refer back to the family history for appropriate  cancer screening. Complete cancer screening recommendations for Kayy and her relatives will be discussed once we have the additional genetic testing results available from the [facility].      Inheritance:  Mutations in BRCA1 and BRCA2 are inherited in an autosomal dominant manner. Kayy cannot pass on an identifiable mutation in these genes to her children based on this negative test result. Mutations in these genes do not skip generations.       Additional Testing:  It is possible Kayy does carry a gene or combination of genes and environment that increase her risk for ovarian cancer. Kayy is enrolled in the [facility] and will be receiving genetic analysis of other ovarian cancer-associated genes as part of this program. These results will most likely be available in the next couple of months. Screening and management recommendations for Kayy and her relatives may change based on these results.      Summary:  We do not have an explanation for Kayy's ovarian cancer at this time. Because of that, it is important that she continue with cancer screening based on her personal and family history as discussed above.     Plan:  1. She has access to her test results through [facility].  2. I will contact Kayy once the additional genetic analysis results are available from the [facility].     Jany Kimbrough MS, [facility]  Licensed, Certified Genetic Counselor  Office: [phone]  Email: [email or web address]

## 2021-06-23 ENCOUNTER — PATIENT OUTREACH (OUTPATIENT)
Dept: ONCOLOGY | Facility: CLINIC | Age: 41
End: 2021-06-23

## 2021-06-23 ENCOUNTER — INFUSION THERAPY VISIT (OUTPATIENT)
Dept: INFUSION THERAPY | Facility: CLINIC | Age: 41
End: 2021-06-23
Payer: COMMERCIAL

## 2021-06-23 ENCOUNTER — ONCOLOGY VISIT (OUTPATIENT)
Dept: ONCOLOGY | Facility: CLINIC | Age: 41
End: 2021-06-23
Payer: COMMERCIAL

## 2021-06-23 VITALS
TEMPERATURE: 98.7 F | HEIGHT: 62 IN | WEIGHT: 102 LBS | OXYGEN SATURATION: 100 % | DIASTOLIC BLOOD PRESSURE: 71 MMHG | BODY MASS INDEX: 18.77 KG/M2 | HEART RATE: 71 BPM | SYSTOLIC BLOOD PRESSURE: 114 MMHG

## 2021-06-23 DIAGNOSIS — C56.9 OVARIAN CANCER, UNSPECIFIED LATERALITY (H): Primary | ICD-10-CM

## 2021-06-23 DIAGNOSIS — Z51.11 ENCOUNTER FOR ANTINEOPLASTIC CHEMOTHERAPY: ICD-10-CM

## 2021-06-23 LAB
ALBUMIN SERPL-MCNC: 3.9 G/DL (ref 3.4–5)
ALP SERPL-CCNC: 59 U/L (ref 40–150)
ALT SERPL W P-5'-P-CCNC: 27 U/L (ref 0–50)
ANION GAP SERPL CALCULATED.3IONS-SCNC: 6 MMOL/L (ref 3–14)
AST SERPL W P-5'-P-CCNC: 21 U/L (ref 0–45)
BILIRUB SERPL-MCNC: 0.9 MG/DL (ref 0.2–1.3)
BUN SERPL-MCNC: 13 MG/DL (ref 7–30)
CALCIUM SERPL-MCNC: 9 MG/DL (ref 8.5–10.1)
CANCER AG125 SERPL-ACNC: 9 U/ML (ref 0–30)
CHLORIDE SERPL-SCNC: 108 MMOL/L (ref 94–109)
CO2 SERPL-SCNC: 27 MMOL/L (ref 20–32)
CREAT SERPL-MCNC: 0.73 MG/DL (ref 0.52–1.04)
DIFFERENTIAL METHOD BLD: ABNORMAL
EOSINOPHIL # BLD AUTO: 0.1 10E9/L (ref 0–0.7)
EOSINOPHIL NFR BLD AUTO: 2 %
ERYTHROCYTE [DISTWIDTH] IN BLOOD BY AUTOMATED COUNT: 16.9 % (ref 10–15)
GFR SERPL CREATININE-BSD FRML MDRD: >90 ML/MIN/{1.73_M2}
GLUCOSE SERPL-MCNC: 94 MG/DL (ref 70–99)
HCT VFR BLD AUTO: 32.5 % (ref 35–47)
HGB BLD-MCNC: 10.6 G/DL (ref 11.7–15.7)
LYMPHOCYTES # BLD AUTO: 1.8 10E9/L (ref 0.8–5.3)
LYMPHOCYTES NFR BLD AUTO: 57 %
MAGNESIUM SERPL-MCNC: 2.1 MG/DL (ref 1.6–2.3)
MCH RBC QN AUTO: 27.2 PG (ref 26.5–33)
MCHC RBC AUTO-ENTMCNC: 32.6 G/DL (ref 31.5–36.5)
MCV RBC AUTO: 84 FL (ref 78–100)
MONOCYTES # BLD AUTO: 0.2 10E9/L (ref 0–1.3)
MONOCYTES NFR BLD AUTO: 6 %
NEUTROPHILS # BLD AUTO: 1.1 10E9/L (ref 1.6–8.3)
NEUTROPHILS NFR BLD AUTO: 35 %
PLATELET # BLD AUTO: 169 10E9/L (ref 150–450)
POTASSIUM SERPL-SCNC: 3.7 MMOL/L (ref 3.4–5.3)
PROT SERPL-MCNC: 7.2 G/DL (ref 6.8–8.8)
RBC # BLD AUTO: 3.89 10E12/L (ref 3.8–5.2)
SODIUM SERPL-SCNC: 141 MMOL/L (ref 133–144)
WBC # BLD AUTO: 3.2 10E9/L (ref 4–11)

## 2021-06-23 PROCEDURE — 85025 COMPLETE CBC W/AUTO DIFF WBC: CPT | Performed by: NURSE PRACTITIONER

## 2021-06-23 PROCEDURE — 99207 PR NO CHARGE LOS: CPT

## 2021-06-23 PROCEDURE — 99215 OFFICE O/P EST HI 40 MIN: CPT | Performed by: NURSE PRACTITIONER

## 2021-06-23 PROCEDURE — 36415 COLL VENOUS BLD VENIPUNCTURE: CPT | Performed by: NURSE PRACTITIONER

## 2021-06-23 PROCEDURE — 83735 ASSAY OF MAGNESIUM: CPT | Performed by: NURSE PRACTITIONER

## 2021-06-23 PROCEDURE — 86304 IMMUNOASSAY TUMOR CA 125: CPT | Performed by: NURSE PRACTITIONER

## 2021-06-23 PROCEDURE — 80053 COMPREHEN METABOLIC PANEL: CPT | Performed by: NURSE PRACTITIONER

## 2021-06-23 ASSESSMENT — MIFFLIN-ST. JEOR: SCORE: 1075.92

## 2021-06-23 ASSESSMENT — PAIN SCALES - GENERAL: PAINLEVEL: NO PAIN (0)

## 2021-06-23 NOTE — LETTER
2021         RE: Kayy Salazar  [address] [place] [address]        Dear Colleague,    Thank you for referring your patient, Kayy Salazar, to the [facility]. Please see a copy of my visit note below.    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  She is currently undergoing adjuvant treatment with paclitaxel and carboplatin.  She presents for follow up and disease management.          Oncology History:  Initially, she was seen for vomiting and nausea, reduced oral intake, some urinary frequency with abdominal distention over a couple months.  She also lost about 8 pounds in the last couple of weeks.      3/17/2021:  191.  CT AP  IMPRESSION:   1.  Left adnexal soft tissue mass associated with large volume ascites  and foci of peritoneal thickening is suspicious for ovarian/peritoneal  malignancy. Recommend gyne-oncology consultation.  2.  4.1 cm posterior uterine mass is likely a fibroid. Enhancing  nodule on the surface of the uterine fundus could be a subserosal  fibroid or peritoneal deposit.  3.  Focal ill-defined hypoenhancement in the liver, near the falciform  ligament, could be due to focal fatty infiltration versus peritoneal  deposit. Enhancing lesion in segment 8 is favored to be a hemangioma  but needs confirmation with MRI.  4.  Subcentimeter pelvic lymph nodes are indeterminate.  [Access Center: Left adnexal soft tissue mass associated with large  volume ascites and foci of peritoneal thickening is suspicious for  ovarian/peritoneal malignancy. Recommend gyne-oncology consultation.     3/19/21: CT chest  IMPRESSION:   1. No pulmonary nodules or lymphadenopathy.  2. Area of bony sclerosis in the sternum, concern for  metastatic  Disease.     3/19/21: MRI abdomen  IMPRESSION: In this patient with concern for left ovarian neoplastic  lesion, the current MRI scan shows:  1. Multiple hepatic lesions, including:-  a. Indeterminate segment 6 subcapsular lesion, noting lack of  retention on the hepatobiliary phase and T2 hyperintensity.  Possibility of a sclerosing hemangioma should be considered along with  possibility of small metastatic lesion (considered less likely). If  this subcapsular lesion is not evaluated in the operating room as part  of staging procedure, recommend three-month follow-up MRI exam using  Eovist as intravenous contrast.  b. Hypodensity along the falciform ligament seen on recent CT exam has  been characterized by this MRI exam as focal fatty infiltration.  c. Multiple benign lesions, including at least 3 focal nodular  hyperplasias and hemangioma.  2. Moderate to large volume ascites with mild omental thickening,  presumably malignant in the setting of ovarian malignancy. This is  better characterized on CT abdomen and pelvis exam.  3. Layering sludge in the gallbladder.     4/1/21: CEA <0.5. CA 19-9 9.     4/6/21:  Diagnostic laparoscopy with biopsies, converted to exploratory laparotomy, modified radical hysterectomy, bilateral salpingo-oophorectomy, bilateral ureterolysis, bladder peritonectomy, omentectomy, diaphragm stripping, liver mobilization, colon mobilization, small bowel serosa oversewing, resection of abdominal wall nodule  Partial hepatectomy performed by Surgical Oncology (Dr. Zuñiga)   Optimal debulking to no residual macroscopic residual disease R0  FINAL DIAGNOSIS:   A. OVARY, RIGHT, BIOPSY:   - Serous borderline tumor   B. NODULE, CUL DE SAC, EXCISION:   - Positive for serous carcinoma, low grade   C. OMENTUM, BIOPSY:   - Positive for serous carcinoma   D. OVARY AND FALLOPIAN TUBE, LEFT, SALPINGO-OOPHORECTOMY:   - Serous carcinoma of the ovary, low grade on a background of a borderline     tumor   - Multiple follicular cysts   - Fallopian tube with serosal involvement by low grade serous carcinoma   E. UTERUS, CERVIX, RIGHT FALLOPIAN TUBE AND OVARY, HYSTERECTOMY WITH RIGHT    SALPINGO-OOPHORECTOMY:   - Secretory-type endometrium   - Leiomyomas and one adenomyoma   - Uterine serosal involvement by low grade serous carcinoma,   endometriosis/endosalpingiosis and fibrous   adhesions   - Ovary with surface serous borderline tumor and fibrous adhesions   - Fallopian tube serosal inflammation and adhesions   F. NODULE, CUL DE SAC, EXCISION #2:   - Serosal hyperplasia and calcification   - Fibrous adhesions   - Negative for malignancy   G. NODULE, LEFT PELVIC SIDEWALL, EXCISION:   - Fibroadipose tissue, positive for low grade serous carcinoma   H. FALCIFORM LIGAMENT, BIOPSY:   - Fibroadipose tissue with endosalpingiosis   - Negative for malignancy   I. OMENTUM, OMENTECTOMY:   - Omental adipose tissue, positive for low grade serous carcinoma   (non-invasive implants)   - Omental lymph node positive for metastatic low grade serous carcinoma,   and endosalpingiosis   J. NODULE, RIGHT LOWER QUADRANT ABDOMINAL WALL, EXCISION:   - Fibroadipose tissue, positive for low grade serous carcinoma   K. RIGHT EVERETT-DIAPHRAGM PERITONEUM, EXCISION:   - Fibroadipose tissue, positive for low grade serous carcinoma   - Endosalpingiosis, calcifications and fibrous adhesions   L. NODULE, LIVER SURFACE, EXCISION:   - Hepatic parenchyma and capsular endosalpingiosis   - Negative for malignancy   M. NODULE, SMALL BOWEL SEROSAL, EXCISION:   - Serosa with inflammation, necrosis and mesothelial hyperplasia   - Negative for malignancy   N. NODULE, SMALL BOWEL MESENTERY, EXCISION:   - Fibroadipose tissue, positive for low grade serous carcinoma   - Serosa with inflammation, necrosis and mesothelial hyperplasia   O. NODULE, SMALL BOWEL MESENTERY, EXCISION #2:   - Serosa with inflammation, necrosis and mesothelial hyperplasia   P.  "LIVER, PARTIAL LEFT HEPATECTOMY LEVEL 3:   - Focal nodular hyperplasia, FNH; negative for malignancy     (with typical trichrome stain and map-like pattern on glutamine   synthetase immunohistochemistry, with   appropriate controls)      Plan: Carboplatin AUC 6 and paclitaxel 175 mg/m2 x 6 cycles.     5/10/21: Cycle 1 carboplatin and paclitaxel.   14.  6/2/21: Cycle 2 carboplatin and paclitaxel.   8.  6/23/21: Cycle 3 carboplatin and paclitaxel.   pending.                Today she comes to clinic feeling well overall.  She did have some nausea after her last cycle but was able to \"stay ahead of it\" with compazine.  She did lose a couple pounds during this time but improved once she started to feel better.  She also had some constipation for 3-5 days which she managed with Senakot 2 tabs twice per day.  She was able to continue to drink well.  She had fatigue that lasted for about 5 days.  Her biggest issue was bone pain which she managed with Tylenol.  She denies any changes in her bladder habits, no emesis, no lower extremity edema, and no  fevers or chills, and no chest pain or shortness of breath.                           Review of Systems:    Systemic           +weight loss; no fever; no chills; no night sweats; + decreased appetite; +fatigue  Skin           no rashes, or lesions  Eye           no irritation; no changes in vision  Tiffany-Laryngeal           no dysphagia; no hoarseness   Pulmonary    no cough; no shortness of breath  Cardiovascular    no chest pain; no palpitations  Gastrointestinal    no diarrhea; + constipation; no abdominal pain; + changes in bowel habits; no blood in stool  Genitourinary   no urinary frequency; no urinary urgency; no dysuria; no pain; no abnormal vaginal discharge; no abnormal vaginal bleeding  Breast   no breast discharge; no breast changes; no breast pain  Musculoskeletal    + myalgias; + arthralgias; no back pain  Psychiatric           no depressed mood; no " anxiety    Hematologic   no tender lymph nodes; no noticeable swellings or lumps   Endocrine    no hot flashes; no heat/cold intolerance         Neurological   no tremor; no numbness and tingling; no headaches; no difficulty sleeping      Past Medical History:    Past Medical History:   Diagnosis Date     Endometriosis          Past Surgical History:    Past Surgical History:   Procedure Laterality Date     GYN SURGERY      c section      HC TOOTH EXTRACTION W/FORCEP       HEPATECTOMY PARTIAL  4/6/2021    Procedure: Partial left hepatectomy level 3;  Surgeon: Chris Zuñiga MD;  Location: [facility] OR     LAPAROSCOPIC ABLATION ENDOMETRIOSIS      2007     LAPAROSCOPY DIAGNOSTIC (GYN) N/A 4/6/2021    Procedure: Diagnostic laparoscopy with biopsies converted to exploratory laparotmy, modified radical hysterectomy bilateral salpingo-oophorectomy, bilateral ureterolysis, peritonectomy, omentectomy, resection of liver nodule, diapraghm stripping, liver mobilization, colon mobilization, resection of abdominal wall nodule, tumor debulking 2R0, no macroscopic residual disease;  Surgeon: Ben Ch         Health Maintenance Due   Topic Date Due     PREVENTIVE CARE VISIT  Never done     ADVANCE CARE PLANNING  Never done     Pneumococcal Vaccine: Pediatrics (0 to 5 Years) and At-Risk Patients (6 to 64 Years) (1 of 4 - PCV13) Never done     COVID-19 Vaccine (1) Never done     HIV SCREENING  Never done     HEPATITIS C SCREENING  Never done     DTAP/TDAP/TD IMMUNIZATION (1 - Tdap) Never done     PAP  03/01/2016       Current Medications:     Current Outpatient Medications   Medication Sig Dispense Refill     acetaminophen (TYLENOL) 325 MG tablet Take 2 tablets (650 mg) by mouth every 6 hours 60 tablet 0     prochlorperazine (COMPAZINE) 10 MG tablet Take 1 tablet (10 mg) by mouth every 6 hours as needed (nausea/vomiting) 30 tablet 5     senna-docusate (SENOKOT-S/PERICOLACE) 8.6-50 MG tablet Take 2 tablets by mouth 2 times daily  "as needed for constipation 60 tablet 0         Allergies:        Allergies   Allergen Reactions     Amoxicillin Hives     Full body hives after amoxacillin & Vicodin after wisdom teeth pulled when around 29 yo.     Vicodin [Hydrocodone-Acetaminophen] Rash        Social History:     Social History     Tobacco Use     Smoking status: Never Smoker     Smokeless tobacco: Never Used   Substance Use Topics     Alcohol use: No       History   Drug Use No         Family History:     The patient's family history is notable for:    Family History   Problem Relation Age of Onset     Blood Disease Mother         hemachromatosis      Diabetes Maternal Grandmother      Alzheimer Disease Maternal Grandmother      Multiple Sclerosis Maternal Grandmother      Pancreatic Cancer Paternal Grandmother      Leukemia Paternal Grandfather      Anesthesia Reaction No family hx of      Deep Vein Thrombosis (DVT) No family hx of          Physical Exam:     /71 (BP Location: Left arm, Patient Position: Chair, Cuff Size: Adult Regular)   Pulse 71   Temp 98.7  F (37.1  C) (Oral)   Ht 1.575 m (5' 2\")   Wt 46.3 kg (102 lb)   LMP 03/11/2021   SpO2 100%   BMI 18.66 kg/m    Body mass index is 18.66 kg/m .    General Appearance: healthy and alert, no distress     HEENT: no palpable nodules or masses        Cardiovascular: regular rate and rhythm, no gallops, rubs or murmurs     Respiratory: lungs clear, no rales, rhonchi or wheezes    Musculoskeletal: extremities non tender and without edema    Skin: no lesions or rashes     Neurological: normal gait, no gross defects     Psychiatric: appropriate mood and affect                               Hematological: normal cervical and supraclavicular     Gastrointestinal:       abdomen soft, non-tender, non-distended    Genitourinary: Deferred.    Lab Results   Component Value Date    WBC 3.2 06/23/2021     Lab Results   Component Value Date    RBC 3.89 06/23/2021     Lab Results   Component Value " Date    HGB 10.6 06/23/2021     Lab Results   Component Value Date    HCT 32.5 06/23/2021     No components found for: MCT  Lab Results   Component Value Date    MCV 84 06/23/2021     Lab Results   Component Value Date    MCH 27.2 06/23/2021     Lab Results   Component Value Date    MCHC 32.6 06/23/2021     Lab Results   Component Value Date    RDW 16.9 06/23/2021     Lab Results   Component Value Date     06/23/2021     % Neutrophils   Date Value Ref Range Status   06/23/2021 35.0 % Final     % Lymphocytes   Date Value Ref Range Status   06/23/2021 57.0 % Final     % Monocytes   Date Value Ref Range Status   06/23/2021 6.0 % Final     % Eosinophils   Date Value Ref Range Status   06/23/2021 2.0 % Final     % Basophils   Date Value Ref Range Status   06/02/2021 1.2 % Final     % Immature Granulocytes   Date Value Ref Range Status   06/02/2021 0.4 % Final     Absolute Neutrophil   Date Value Ref Range Status   06/23/2021 1.1 (L) 1.6 - 8.3 10e9/L Final     Absolute Lymphocytes   Date Value Ref Range Status   06/23/2021 1.8 0.8 - 5.3 10e9/L Final     Absolute Monocytes   Date Value Ref Range Status   06/23/2021 0.2 0.0 - 1.3 10e9/L Final     Absolute Eosinophils   Date Value Ref Range Status   06/23/2021 0.1 0.0 - 0.7 10e9/L Final     Absolute Basophils   Date Value Ref Range Status   06/02/2021 0.1 0.0 - 0.2 10e9/L Final     Abs Immature Granulocytes   Date Value Ref Range Status   06/02/2021 0.0 0 - 0.4 10e9/L Final     Nucleated RBCs   Date Value Ref Range Status   03/20/2021 0 0 /100 Final     Absolute Nucleated RBC   Date Value Ref Range Status   03/20/2021 0.0  Final       Last Comprehensive Metabolic Panel:  Sodium   Date Value Ref Range Status   06/23/2021 141 133 - 144 mmol/L Final     Potassium   Date Value Ref Range Status   06/23/2021 3.7 3.4 - 5.3 mmol/L Final     Chloride   Date Value Ref Range Status   06/23/2021 108 94 - 109 mmol/L Final     Carbon Dioxide   Date Value Ref Range Status    06/23/2021 27 20 - 32 mmol/L Final     Anion Gap   Date Value Ref Range Status   06/23/2021 6 3 - 14 mmol/L Final     Glucose   Date Value Ref Range Status   06/23/2021 94 70 - 99 mg/dL Final     Urea Nitrogen   Date Value Ref Range Status   06/23/2021 13 7 - 30 mg/dL Final     Creatinine   Date Value Ref Range Status   06/23/2021 0.73 0.52 - 1.04 mg/dL Final     GFR Estimate   Date Value Ref Range Status   06/23/2021 >90 >60 mL/min/[1.73_m2] Final     Comment:     Non  GFR Calc  Starting 12/18/2018, serum creatinine based estimated GFR (eGFR) will be   calculated using the Chronic Kidney Disease Epidemiology Collaboration   (CKD-EPI) equation.       Calcium   Date Value Ref Range Status   06/23/2021 9.0 8.5 - 10.1 mg/dL Final     Bilirubin Total   Date Value Ref Range Status   06/23/2021 0.9 0.2 - 1.3 mg/dL Final     Alkaline Phosphatase   Date Value Ref Range Status   06/23/2021 59 40 - 150 U/L Final     ALT   Date Value Ref Range Status   06/23/2021 27 0 - 50 U/L Final     AST   Date Value Ref Range Status   06/23/2021 21 0 - 45 U/L Final     Lab Results   Component Value Date    ALBUMIN 3.9 06/23/2021     Lab Results   Component Value Date    PROTTOTAL 7.2 06/23/2021     Magnesium   Date Value Ref Range Status   06/23/2021 2.1 1.6 - 2.3 mg/dL Final         Assessment:    Kayy Salazar is a 40 year old woman with a 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  She is currently undergoing adjuvant treatment with paclitaxel and carboplatin.  She presents for follow up and disease management.         40 minutes spent on the date of the encounter doing chart review, history and exam, documentation, and further activities as noted above.      Plan:     1.)        Ovarian cancer:  Will need to defer chemotherapy today due to neutropenia.  Ok to proceed with cycle 3 of chemotherapy next week if labs are WDL.  RTC in 3 weeks after cycle 3 for labs, provider visit, and cycle 4.  Reviewed  signs and symptoms for when she should contact the clinic or seek additional care.  Patient to contact the clinic with any questions or concerns in the interim.        Genetic risk factors were assessed and she was negative for mutations in BRCA 1/2.       Labs and/or tests ordered include:  CBC. CMP. Mag. .                2.)        Health maintenance:  Issues addressed today include following up with PCP for annual health maintenance and non-gynecologic issues.      3.)        Chemotherapy induced nausea: Encouraged to continue use of compazine every 6 hours if needed for nausea.  She can take zofran 3 days after chemo if still having nausea.  Continue to drink at least 64 oz of fluid a day to prevent dehydration.  Encouraged small frequent meals high in protein.       4.)        Fatigue: Discussed that fatigue is a common, and sometimes hard to manage, symptom of chemotherapy.  Encouraged continuing to try to stay active and getting exercise with frequent rest breaks to combat this.     5.)        Constipation:  She can continue to manage her constipation with Senakot 2 tabs twice per day as she has been doing.  Also discussed adding Miralax daily after chemo if needed.    6.) Arthralgias:  Discussed that bone pain is a common symptoms after taxol. She can manage this with tylenol as needed and occasional ibuprofen but would not advise regular ibuprofen use.  She can also start plain Claritin daily which is usually helpful.    7.) Neutropenia:  Today her ANC was 1.1.  Hold chemo today as above.  Will add Neulasta on pro to her treatment plan to try to prevent neutropenia in the future.  Discussed that this can increase bone pain and to treat this as above.      Bhavani Torrez, DNP, APRN, FNP-C  Nurse Practitioner  Division of Gynecologic Oncology  Pager: [phone]     CC  Patient Care Team:  [facility] as PCP - General (Clinic)  Karol Hairston MD as Assigned PCP  Luis Ch,  MD as MD (Gynecologic Oncology)  Karol Hairston MD as Referring Physician (Family Medicine)  Luis Ch MD as Assigned Cancer Care Provider          Again, thank you for allowing me to participate in the care of your patient.        Sincerely,        PAOLO Wasserman CNP

## 2021-06-23 NOTE — PROGRESS NOTES
RN reached out to patient.     Patient aware that [facility] was able to accommodate Infusion on Monday. Patient also was able to get next infusion pushed out a week due to delay.     Patient appreciate of the call.    Kiki Goyal RN

## 2021-06-23 NOTE — NURSING NOTE
"Oncology Rooming Note    June 23, 2021 7:56 AM   Kayy Salazar is a 40 year old female who presents for:    Chief Complaint   Patient presents with     Oncology Clinic Visit     prior to tx     Initial Vitals: /71 (BP Location: Left arm, Patient Position: Chair, Cuff Size: Adult Regular)   Pulse 71   Temp 98.7  F (37.1  C) (Oral)   Ht 1.575 m (5' 2\")   Wt 46.3 kg (102 lb)   LMP 03/11/2021   SpO2 100%   BMI 18.66 kg/m   Estimated body mass index is 18.66 kg/m  as calculated from the following:    Height as of this encounter: 1.575 m (5' 2\").    Weight as of this encounter: 46.3 kg (102 lb). Body surface area is 1.42 meters squared.  No Pain (0) Comment: Data Unavailable   Patient's last menstrual period was 03/11/2021.  Allergies reviewed: Yes  Medications reviewed: Yes    Medications: Medication refills not needed today.  Pharmacy name entered into [facility]:    [id number] [place], [place] - [address] [facility] - [place], [place] - [address], [address]    Clinical concerns: NO Bhavani was notified.      Denae Serna CMA              "

## 2021-06-23 NOTE — PROGRESS NOTES
Infusion Nursing Note:  Kayy Salazar presents today for C3D1 Taxol/Carboplatin. However, chemo being deferred due to neutropenia.  Patient seen by provider today: Yes: Bhavani Torrez NP    Notes: Per Bhavani Torrez NP, defer chemo x 1 week.  Patient would like chemo on 6/28/21 due to vacation plans.  If unable to get in at [place] on 6/28/21, patient is willing to drive to a different location.    Discharge Plan:   Charge nurse looking into availability on 6/28/21 and will contact patient, or , regarding details about upcoming chemo.      Lynette Kulkarni RN

## 2021-06-28 ENCOUNTER — INFUSION THERAPY VISIT (OUTPATIENT)
Dept: INFUSION THERAPY | Facility: CLINIC | Age: 41
End: 2021-06-28
Payer: COMMERCIAL

## 2021-06-28 VITALS
WEIGHT: 102.5 LBS | RESPIRATION RATE: 16 BRPM | OXYGEN SATURATION: 100 % | HEART RATE: 58 BPM | TEMPERATURE: 98.3 F | SYSTOLIC BLOOD PRESSURE: 108 MMHG | BODY MASS INDEX: 18.75 KG/M2 | DIASTOLIC BLOOD PRESSURE: 72 MMHG

## 2021-06-28 DIAGNOSIS — Z51.11 ENCOUNTER FOR ANTINEOPLASTIC CHEMOTHERAPY: Primary | ICD-10-CM

## 2021-06-28 DIAGNOSIS — Z51.11 ENCOUNTER FOR ANTINEOPLASTIC CHEMOTHERAPY: ICD-10-CM

## 2021-06-28 DIAGNOSIS — T45.1X5A CHEMOTHERAPY-INDUCED NEUTROPENIA (H): ICD-10-CM

## 2021-06-28 DIAGNOSIS — T45.1X5A CHEMOTHERAPY-INDUCED NEUTROPENIA (H): Primary | ICD-10-CM

## 2021-06-28 DIAGNOSIS — D70.1 CHEMOTHERAPY-INDUCED NEUTROPENIA (H): ICD-10-CM

## 2021-06-28 DIAGNOSIS — C56.9 OVARIAN CANCER, UNSPECIFIED LATERALITY (H): ICD-10-CM

## 2021-06-28 DIAGNOSIS — D70.1 CHEMOTHERAPY-INDUCED NEUTROPENIA (H): Primary | ICD-10-CM

## 2021-06-28 DIAGNOSIS — C56.9 OVARIAN CANCER, UNSPECIFIED LATERALITY (H): Primary | ICD-10-CM

## 2021-06-28 LAB
ALBUMIN SERPL-MCNC: 4 G/DL (ref 3.4–5)
ALP SERPL-CCNC: 66 U/L (ref 40–150)
ALT SERPL W P-5'-P-CCNC: 29 U/L (ref 0–50)
ANION GAP SERPL CALCULATED.3IONS-SCNC: 3 MMOL/L (ref 3–14)
AST SERPL W P-5'-P-CCNC: 21 U/L (ref 0–45)
BILIRUB SERPL-MCNC: 0.7 MG/DL (ref 0.2–1.3)
BUN SERPL-MCNC: 11 MG/DL (ref 7–30)
CALCIUM SERPL-MCNC: 8.9 MG/DL (ref 8.5–10.1)
CHLORIDE SERPL-SCNC: 108 MMOL/L (ref 94–109)
CO2 SERPL-SCNC: 29 MMOL/L (ref 20–32)
CREAT SERPL-MCNC: 0.67 MG/DL (ref 0.52–1.04)
DIFFERENTIAL METHOD BLD: ABNORMAL
EOSINOPHIL # BLD AUTO: 0 10E9/L (ref 0–0.7)
EOSINOPHIL NFR BLD AUTO: 1 %
ERYTHROCYTE [DISTWIDTH] IN BLOOD BY AUTOMATED COUNT: 16.2 % (ref 10–15)
GFR SERPL CREATININE-BSD FRML MDRD: >90 ML/MIN/{1.73_M2}
GLUCOSE SERPL-MCNC: 89 MG/DL (ref 70–99)
HCT VFR BLD AUTO: 34.5 % (ref 35–47)
HGB BLD-MCNC: 11 G/DL (ref 11.7–15.7)
LYMPHOCYTES # BLD AUTO: 2.2 10E9/L (ref 0.8–5.3)
LYMPHOCYTES NFR BLD AUTO: 52 %
MAGNESIUM SERPL-MCNC: 2 MG/DL (ref 1.6–2.3)
MCH RBC QN AUTO: 27.6 PG (ref 26.5–33)
MCHC RBC AUTO-ENTMCNC: 31.9 G/DL (ref 31.5–36.5)
MCV RBC AUTO: 87 FL (ref 78–100)
MONOCYTES # BLD AUTO: 0.2 10E9/L (ref 0–1.3)
MONOCYTES NFR BLD AUTO: 5 %
NEUTROPHILS # BLD AUTO: 1.7 10E9/L (ref 1.6–8.3)
NEUTROPHILS NFR BLD AUTO: 42 %
PLATELET # BLD AUTO: 133 10E9/L (ref 150–450)
POTASSIUM SERPL-SCNC: 4.1 MMOL/L (ref 3.4–5.3)
PROT SERPL-MCNC: 7.5 G/DL (ref 6.8–8.8)
RBC # BLD AUTO: 3.98 10E12/L (ref 3.8–5.2)
SODIUM SERPL-SCNC: 140 MMOL/L (ref 133–144)
WBC # BLD AUTO: 4.1 10E9/L (ref 4–11)

## 2021-06-28 PROCEDURE — 96415 CHEMO IV INFUSION ADDL HR: CPT | Performed by: NURSE PRACTITIONER

## 2021-06-28 PROCEDURE — 96417 CHEMO IV INFUS EACH ADDL SEQ: CPT | Performed by: NURSE PRACTITIONER

## 2021-06-28 PROCEDURE — 96413 CHEMO IV INFUSION 1 HR: CPT | Performed by: NURSE PRACTITIONER

## 2021-06-28 PROCEDURE — S0028 INJECTION, FAMOTIDINE, 20 MG: HCPCS | Performed by: NURSE PRACTITIONER

## 2021-06-28 PROCEDURE — 36415 COLL VENOUS BLD VENIPUNCTURE: CPT | Performed by: NURSE PRACTITIONER

## 2021-06-28 PROCEDURE — 85025 COMPLETE CBC W/AUTO DIFF WBC: CPT | Performed by: NURSE PRACTITIONER

## 2021-06-28 PROCEDURE — 96375 TX/PRO/DX INJ NEW DRUG ADDON: CPT | Performed by: NURSE PRACTITIONER

## 2021-06-28 PROCEDURE — 83735 ASSAY OF MAGNESIUM: CPT | Performed by: NURSE PRACTITIONER

## 2021-06-28 PROCEDURE — 99207 PR NO CHARGE LOS: CPT

## 2021-06-28 PROCEDURE — 80053 COMPREHEN METABOLIC PANEL: CPT | Performed by: NURSE PRACTITIONER

## 2021-06-28 RX ORDER — HEPARIN SODIUM (PORCINE) LOCK FLUSH IV SOLN 100 UNIT/ML 100 UNIT/ML
5 SOLUTION INTRAVENOUS
Status: CANCELLED | OUTPATIENT
Start: 2021-06-28

## 2021-06-28 RX ORDER — NALOXONE HYDROCHLORIDE 0.4 MG/ML
.1-.4 INJECTION, SOLUTION INTRAMUSCULAR; INTRAVENOUS; SUBCUTANEOUS
Status: CANCELLED | OUTPATIENT
Start: 2021-06-28

## 2021-06-28 RX ORDER — DIPHENHYDRAMINE HCL 25 MG
50 CAPSULE ORAL ONCE
Status: CANCELLED
Start: 2021-06-28

## 2021-06-28 RX ORDER — PALONOSETRON 0.05 MG/ML
0.25 INJECTION, SOLUTION INTRAVENOUS ONCE
Status: CANCELLED
Start: 2021-06-28

## 2021-06-28 RX ORDER — LORAZEPAM 2 MG/ML
1 INJECTION INTRAMUSCULAR EVERY 6 HOURS PRN
Status: CANCELLED
Start: 2021-06-28

## 2021-06-28 RX ORDER — EPINEPHRINE 1 MG/ML
0.3 INJECTION, SOLUTION INTRAMUSCULAR; SUBCUTANEOUS EVERY 5 MIN PRN
Status: CANCELLED | OUTPATIENT
Start: 2021-06-28

## 2021-06-28 RX ORDER — PALONOSETRON 0.05 MG/ML
0.25 INJECTION, SOLUTION INTRAVENOUS ONCE
Status: COMPLETED | OUTPATIENT
Start: 2021-06-28 | End: 2021-06-28

## 2021-06-28 RX ORDER — METHYLPREDNISOLONE SODIUM SUCCINATE 125 MG/2ML
125 INJECTION, POWDER, LYOPHILIZED, FOR SOLUTION INTRAMUSCULAR; INTRAVENOUS
Status: CANCELLED
Start: 2021-06-28

## 2021-06-28 RX ORDER — DIPHENHYDRAMINE HYDROCHLORIDE 50 MG/ML
50 INJECTION INTRAMUSCULAR; INTRAVENOUS
Status: CANCELLED
Start: 2021-06-28

## 2021-06-28 RX ORDER — SODIUM CHLORIDE 9 MG/ML
1000 INJECTION, SOLUTION INTRAVENOUS CONTINUOUS PRN
Status: CANCELLED
Start: 2021-06-28

## 2021-06-28 RX ORDER — HEPARIN SODIUM,PORCINE 10 UNIT/ML
5 VIAL (ML) INTRAVENOUS
Status: CANCELLED | OUTPATIENT
Start: 2021-06-28

## 2021-06-28 RX ORDER — DIPHENHYDRAMINE HCL 25 MG
50 CAPSULE ORAL ONCE
Status: COMPLETED | OUTPATIENT
Start: 2021-06-28 | End: 2021-06-28

## 2021-06-28 RX ORDER — ALBUTEROL SULFATE 90 UG/1
1-2 AEROSOL, METERED RESPIRATORY (INHALATION)
Status: CANCELLED
Start: 2021-06-28

## 2021-06-28 RX ORDER — MEPERIDINE HYDROCHLORIDE 25 MG/ML
25 INJECTION INTRAMUSCULAR; INTRAVENOUS; SUBCUTANEOUS EVERY 30 MIN PRN
Status: CANCELLED | OUTPATIENT
Start: 2021-06-28

## 2021-06-28 RX ORDER — ALBUTEROL SULFATE 0.83 MG/ML
2.5 SOLUTION RESPIRATORY (INHALATION)
Status: CANCELLED | OUTPATIENT
Start: 2021-06-28

## 2021-06-28 RX ADMIN — PALONOSETRON 0.25 MG: 0.05 INJECTION, SOLUTION INTRAVENOUS at 11:17

## 2021-06-28 RX ADMIN — Medication 50 MG: at 11:21

## 2021-06-28 RX ADMIN — Medication 1000 ML: at 11:18

## 2021-06-28 ASSESSMENT — PAIN SCALES - GENERAL: PAINLEVEL: MILD PAIN (2)

## 2021-06-28 NOTE — PROGRESS NOTES
Infusion Nursing Note:  Kayy Salazar presents today for C3 D1 taxol/ carboplatin.    Patient seen by provider today: Yes: Bhavani Torrez NP   present during visit today: Not Applicable.    Note: Kayy refusing onpro (neulasta) today. ARACELY Torrez NP/ SEVERIANO Cosme RN OK for Kayy to refuse onpro placement today.    Kayy counseled on neutropenic precautions and possibility that chemo therapy in 3 weeks may be delayed due to neutropenia. Kayy verbalizes understanding and accepts the above possibility of chemo needing to be delayed due to neutropenia. Also verbalized understanding of calling with any fevers >100.4F or chills and feeling unwell without fever..    Intravenous Access:  Peripheral IV placed.    Treatment Conditions:  Lab Results   Component Value Date    HGB 11.0 06/28/2021     Lab Results   Component Value Date    WBC 4.1 06/28/2021      Lab Results   Component Value Date    ANEU 1.7 06/28/2021     Lab Results   Component Value Date     06/28/2021      Lab Results   Component Value Date     06/28/2021                   Lab Results   Component Value Date    POTASSIUM 4.1 06/28/2021           Lab Results   Component Value Date    MAG 2.0 06/28/2021            Lab Results   Component Value Date    CR 0.67 06/28/2021                   Lab Results   Component Value Date    SEBAS 8.9 06/28/2021                Lab Results   Component Value Date    BILITOTAL 0.7 06/28/2021           Lab Results   Component Value Date    ALBUMIN 4.0 06/28/2021                    Lab Results   Component Value Date    ALT 29 06/28/2021           Lab Results   Component Value Date    AST 21 06/28/2021       Results reviewed, labs MET treatment parameters, ok to proceed with treatment.  Magnesium 2.0.      Post Infusion Assessment:  Patient tolerated infusion without incident.  Blood return noted pre and post infusion.  Site patent and intact, free from redness, edema or discomfort.  No evidence of  extravasations.  Access discontinued per protocol.       Discharge Plan:   Patient discharged in stable condition accompanied by: self.  Departure Mode: Ambulatory.  Verbally reviewed next appointments on July 19th .      Tresa Cosme RN

## 2021-07-07 LAB — COPATH REPORT: NORMAL

## 2021-07-12 NOTE — PROGRESS NOTES
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  She is currently undergoing adjuvant treatment with paclitaxel and carboplatin.  She presents for follow up and disease management.  In light of the recent COVID19 pandemic, and in accordance with our group and national guidelines this patients care has been reviewed and it is felt that presenting for her visit would be more likely to increase rather than decrease her relative risks.  Therefore this visit was conducted by video.             Oncology History:  Initially, she was seen for vomiting and nausea, reduced oral intake, some urinary frequency with abdominal distention over a couple months.  She also lost about 8 pounds in the last couple of weeks.      3/17/2021:  191.  CT AP  IMPRESSION:   1.  Left adnexal soft tissue mass associated with large volume ascites  and foci of peritoneal thickening is suspicious for ovarian/peritoneal  malignancy. Recommend gyne-oncology consultation.  2.  4.1 cm posterior uterine mass is likely a fibroid. Enhancing  nodule on the surface of the uterine fundus could be a subserosal  fibroid or peritoneal deposit.  3.  Focal ill-defined hypoenhancement in the liver, near the falciform  ligament, could be due to focal fatty infiltration versus peritoneal  deposit. Enhancing lesion in segment 8 is favored to be a hemangioma  but needs confirmation with MRI.  4.  Subcentimeter pelvic lymph nodes are indeterminate.  [Access Center: Left adnexal soft tissue mass associated with large  volume ascites and foci of peritoneal thickening is suspicious for  ovarian/peritoneal  malignancy. Recommend gyne-oncology consultation.     3/19/21: CT chest  IMPRESSION:   1. No pulmonary nodules or lymphadenopathy.  2. Area of bony sclerosis in the sternum, concern for metastatic  Disease.     3/19/21: MRI abdomen  IMPRESSION: In this patient with concern for left ovarian neoplastic  lesion, the current MRI scan shows:  1. Multiple hepatic lesions, including:-  a. Indeterminate segment 6 subcapsular lesion, noting lack of  retention on the hepatobiliary phase and T2 hyperintensity.  Possibility of a sclerosing hemangioma should be considered along with  possibility of small metastatic lesion (considered less likely). If  this subcapsular lesion is not evaluated in the operating room as part  of staging procedure, recommend three-month follow-up MRI exam using  Eovist as intravenous contrast.  b. Hypodensity along the falciform ligament seen on recent CT exam has  been characterized by this MRI exam as focal fatty infiltration.  c. Multiple benign lesions, including at least 3 focal nodular  hyperplasias and hemangioma.  2. Moderate to large volume ascites with mild omental thickening,  presumably malignant in the setting of ovarian malignancy. This is  better characterized on CT abdomen and pelvis exam.  3. Layering sludge in the gallbladder.     4/1/21: CEA <0.5. CA 19-9 9.     4/6/21:  Diagnostic laparoscopy with biopsies, converted to exploratory laparotomy, modified radical hysterectomy, bilateral salpingo-oophorectomy, bilateral ureterolysis, bladder peritonectomy, omentectomy, diaphragm stripping, liver mobilization, colon mobilization, small bowel serosa oversewing, resection of abdominal wall nodule  Partial hepatectomy performed by Surgical Oncology (Dr. Zuñiga)   Optimal debulking to no residual macroscopic residual disease R0  FINAL DIAGNOSIS:   A. OVARY, RIGHT, BIOPSY:   - Serous borderline tumor   B. NODULE, CUL DE SAC, EXCISION:   - Positive for serous carcinoma, low grade   C.  OMENTUM, BIOPSY:   - Positive for serous carcinoma   D. OVARY AND FALLOPIAN TUBE, LEFT, SALPINGO-OOPHORECTOMY:   - Serous carcinoma of the ovary, low grade on a background of a borderline    tumor   - Multiple follicular cysts   - Fallopian tube with serosal involvement by low grade serous carcinoma   E. UTERUS, CERVIX, RIGHT FALLOPIAN TUBE AND OVARY, HYSTERECTOMY WITH RIGHT    SALPINGO-OOPHORECTOMY:   - Secretory-type endometrium   - Leiomyomas and one adenomyoma   - Uterine serosal involvement by low grade serous carcinoma,   endometriosis/endosalpingiosis and fibrous   adhesions   - Ovary with surface serous borderline tumor and fibrous adhesions   - Fallopian tube serosal inflammation and adhesions   F. NODULE, CUL DE SAC, EXCISION #2:   - Serosal hyperplasia and calcification   - Fibrous adhesions   - Negative for malignancy   G. NODULE, LEFT PELVIC SIDEWALL, EXCISION:   - Fibroadipose tissue, positive for low grade serous carcinoma   H. FALCIFORM LIGAMENT, BIOPSY:   - Fibroadipose tissue with endosalpingiosis   - Negative for malignancy   I. OMENTUM, OMENTECTOMY:   - Omental adipose tissue, positive for low grade serous carcinoma   (non-invasive implants)   - Omental lymph node positive for metastatic low grade serous carcinoma,   and endosalpingiosis   J. NODULE, RIGHT LOWER QUADRANT ABDOMINAL WALL, EXCISION:   - Fibroadipose tissue, positive for low grade serous carcinoma   K. RIGHT EVERETT-DIAPHRAGM PERITONEUM, EXCISION:   - Fibroadipose tissue, positive for low grade serous carcinoma   - Endosalpingiosis, calcifications and fibrous adhesions   L. NODULE, LIVER SURFACE, EXCISION:   - Hepatic parenchyma and capsular endosalpingiosis   - Negative for malignancy   M. NODULE, SMALL BOWEL SEROSAL, EXCISION:   - Serosa with inflammation, necrosis and mesothelial hyperplasia   - Negative for malignancy   N. NODULE, SMALL BOWEL MESENTERY, EXCISION:   - Fibroadipose tissue, positive for low grade serous carcinoma   -  Serosa with inflammation, necrosis and mesothelial hyperplasia   O. NODULE, SMALL BOWEL MESENTERY, EXCISION #2:   - Serosa with inflammation, necrosis and mesothelial hyperplasia   P. LIVER, PARTIAL LEFT HEPATECTOMY LEVEL 3:   - Focal nodular hyperplasia, FNH; negative for malignancy     (with typical trichrome stain and map-like pattern on glutamine   synthetase immunohistochemistry, with   appropriate controls)      Plan: Carboplatin AUC 6 and paclitaxel 175 mg/m2 x 6 cycles.     5/10/21: Cycle 1 carboplatin and paclitaxel.   14.  6/2/21: Cycle 2 carboplatin and paclitaxel.   8.  6/23/21: Cycle 3 carboplatin and paclitaxel.  Deferred for neutropenia given on 6/28.  Refused Neulasta.   9.  7/19/21: Cycle 4 carboplatin and paclitaxel.   pending.                Today she comes to clinic not feeling well and with a few concerns.  She reports that she has continued with fatigue, bone pain, and aching in her whole body since her last cycle of chemo.  She has not tried anything to help with this.  She continues to struggle with nausea for about a week after chemo.  This cycle she tried not taking compazine due to constipation.  She did have a few days of constipation after chemo instead of the 5+ days she was having.  She was able to continue to eat and drink during this time but admits that it was a struggle.   She is also having significant numbness in her right hand that is extending up to her elbow.  This is pretty constant.  She has noticed decreased strength in her right hand.  She is not interested in physical therapy at this point.  She is not interested in Neulasta due to side effects and concern for the medication itself.  She denies changes in her bladder habits, no emesis, no lower extremity edema, and no  fevers or chills, and no chest pain or shortness of breath.  She does not feel ready for chemo today and is considering stopping treatment.  She is wondering what the risks of that  would be.                Review of Systems:    Systemic           no weight changes; no fever; no chills; no night sweats; + decreased appetite   Skin           no rashes, or lesions  Eye           no irritation; no changes in vision  Tiffany-Laryngeal           no dysphagia; no hoarseness   Pulmonary    no cough; no shortness of breath  Cardiovascular    no chest pain; no palpitations  Gastrointestinal    no diarrhea; + constipation; no abdominal pain; + changes in bowel habits; no blood in stool  Genitourinary   no urinary frequency; no urinary urgency; no dysuria; no pain; no abnormal vaginal discharge; no abnormal vaginal bleeding  Breast   no breast discharge; no breast changes; no breast pain  Musculoskeletal    + myalgias; + arthralgias; no back pain  Psychiatric           no depressed mood; no anxiety    Hematologic   no tender lymph nodes; no noticeable swellings or lumps   Endocrine    no hot flashes; no heat/cold intolerance         Neurological   no tremor; + numbness and tingling; no headaches; no difficulty sleeping      Past Medical History:    Past Medical History:   Diagnosis Date     Endometriosis          Past Surgical History:    Past Surgical History:   Procedure Laterality Date     GYN SURGERY      c section      HC TOOTH EXTRACTION W/FORCEP       HEPATECTOMY PARTIAL  4/6/2021    Procedure: Partial left hepatectomy level 3;  Surgeon: Chris Zuñiga MD;  Location: [facility] OR     LAPAROSCOPIC ABLATION ENDOMETRIOSIS      2007     LAPAROSCOPY DIAGNOSTIC (GYN) N/A 4/6/2021    Procedure: Diagnostic laparoscopy with biopsies converted to exploratory laparotmy, modified radical hysterectomy bilateral salpingo-oophorectomy, bilateral ureterolysis, peritonectomy, omentectomy, resection of liver nodule, diapraghm stripping, liver mobilization, colon mobilization, resection of abdominal wall nodule, tumor debulking 2R0, no macroscopic residual disease;  Surgeon: Ben Ch         Health Maintenance Due  "  Topic Date Due     PREVENTIVE CARE VISIT  Never done     ADVANCE CARE PLANNING  Never done     Pneumococcal Vaccine: Pediatrics (0 to 5 Years) and At-Risk Patients (6 to 64 Years) (1 of 4 - PCV13) Never done     COVID-19 Vaccine (1) Never done     HIV SCREENING  Never done     HEPATITIS C SCREENING  Never done     DTAP/TDAP/TD IMMUNIZATION (1 - Tdap) Never done     PAP  03/01/2016       Current Medications:     Current Outpatient Medications   Medication Sig Dispense Refill     acetaminophen (TYLENOL) 325 MG tablet Take 2 tablets (650 mg) by mouth every 6 hours 60 tablet 0     prochlorperazine (COMPAZINE) 10 MG tablet Take 1 tablet (10 mg) by mouth every 6 hours as needed (nausea/vomiting) 30 tablet 5     senna-docusate (SENOKOT-S/PERICOLACE) 8.6-50 MG tablet Take 2 tablets by mouth 2 times daily as needed for constipation 60 tablet 0         Allergies:        Allergies   Allergen Reactions     Amoxicillin Hives     Full body hives after amoxacillin & Vicodin after wisdom teeth pulled when around 27 yo.     Vicodin [Hydrocodone-Acetaminophen] Rash        Social History:     Social History     Tobacco Use     Smoking status: Never Smoker     Smokeless tobacco: Never Used   Substance Use Topics     Alcohol use: No       History   Drug Use No         Family History:     The patient's family history is notable for:    Family History   Problem Relation Age of Onset     Blood Disease Mother         hemachromatosis      Diabetes Maternal Grandmother      Alzheimer Disease Maternal Grandmother      Multiple Sclerosis Maternal Grandmother      Pancreatic Cancer Paternal Grandmother      Leukemia Paternal Grandfather      Anesthesia Reaction No family hx of      Deep Vein Thrombosis (DVT) No family hx of          Physical Exam:     /77 (BP Location: Left arm, Patient Position: Chair, Cuff Size: Adult Small)   Pulse 58   Temp 98.4  F (36.9  C) (Oral)   Ht 1.575 m (5' 2\")   Wt 47.2 kg (104 lb)   LMP 03/11/2021   " SpO2 100%   BMI 19.02 kg/m    Body mass index is 19.02 kg/m .      General Appearance: healthy and alert, no distress    Eyes:  Eyes grossly normal to inspection.  No discharge or erythema, or obvious scleral/conjunctival abnormalities.    Respiratory: No audible wheeze, cough, or visible cyanosis.  No visible retractions or increased work of breathing.     Musculoskeletal: extremities non tender and without edema    Skin: no lesions or rashes on visible skin    Neurological: normal gait, no gross defects     Psychiatric: appropriate mood and affect. Mentation appears normal, affect normal/bright, judgement and insight intact, normal speech and appearance well-groomed                            The rest of a comprehensive physical examination is deferred due to PHE (public health emergency) video visit restrictions.    Lab Results   Component Value Date    WBC 3.7 07/19/2021    WBC 4.1 06/28/2021     Lab Results   Component Value Date    RBC 3.95 07/19/2021    RBC 3.98 06/28/2021     Lab Results   Component Value Date    HGB 11.2 07/19/2021    HGB 11.0 06/28/2021     Lab Results   Component Value Date    HCT 33.8 07/19/2021    HCT 34.5 06/28/2021     No components found for: MCT  Lab Results   Component Value Date    MCV 86 07/19/2021    MCV 87 06/28/2021     Lab Results   Component Value Date    MCH 28.4 07/19/2021    MCH 27.6 06/28/2021     Lab Results   Component Value Date    MCHC 33.1 07/19/2021    MCHC 31.9 06/28/2021     Lab Results   Component Value Date    RDW 15.5 07/19/2021    RDW 16.2 06/28/2021     Lab Results   Component Value Date     07/19/2021     06/28/2021     % Neutrophils   Date Value Ref Range Status   07/19/2021 39 % Final   06/28/2021 42.0 % Final     % Lymphocytes   Date Value Ref Range Status   07/19/2021 52 % Final   06/28/2021 52.0 % Final     % Monocytes   Date Value Ref Range Status   07/19/2021 7 % Final   06/28/2021 5.0 % Final     % Eosinophils   Date Value Ref Range  Status   07/19/2021 1 % Final   06/28/2021 1.0 % Final     % Basophils   Date Value Ref Range Status   07/19/2021 1 % Final   06/02/2021 1.2 % Final     % Immature Granulocytes   Date Value Ref Range Status   06/02/2021 0.4 % Final     Absolute Neutrophil   Date Value Ref Range Status   06/28/2021 1.7 1.6 - 8.3 10e9/L Final     Absolute Lymphocytes   Date Value Ref Range Status   06/28/2021 2.2 0.8 - 5.3 10e9/L Final     Absolute Monocytes   Date Value Ref Range Status   06/28/2021 0.2 0.0 - 1.3 10e9/L Final     Absolute Eosinophils   Date Value Ref Range Status   06/28/2021 0.0 0.0 - 0.7 10e9/L Final     Absolute Basophils   Date Value Ref Range Status   06/02/2021 0.1 0.0 - 0.2 10e9/L Final     Abs Immature Granulocytes   Date Value Ref Range Status   06/02/2021 0.0 0 - 0.4 10e9/L Final     Absolute Immature Granulocytes   Date Value Ref Range Status   07/19/2021 0.0 <=0.0 10e3/uL Final     Nucleated RBCs   Date Value Ref Range Status   03/20/2021 0 0 /100 Final     Absolute Nucleated RBC   Date Value Ref Range Status   03/20/2021 0.0  Final     Last Comprehensive Metabolic Panel:  Sodium   Date Value Ref Range Status   07/19/2021 142 133 - 144 mmol/L Final   06/28/2021 140 133 - 144 mmol/L Final     Potassium   Date Value Ref Range Status   07/19/2021 3.7 3.4 - 5.3 mmol/L Final   06/28/2021 4.1 3.4 - 5.3 mmol/L Final     Chloride   Date Value Ref Range Status   07/19/2021 109 94 - 109 mmol/L Final   06/28/2021 108 94 - 109 mmol/L Final     Carbon Dioxide   Date Value Ref Range Status   06/28/2021 29 20 - 32 mmol/L Final     Carbon Dioxide (CO2)   Date Value Ref Range Status   07/19/2021 30 20 - 32 mmol/L Final     Anion Gap   Date Value Ref Range Status   07/19/2021 3 3 - 14 mmol/L Final   06/28/2021 3 3 - 14 mmol/L Final     Glucose   Date Value Ref Range Status   07/19/2021 96 70 - 99 mg/dL Final   06/28/2021 89 70 - 99 mg/dL Final     Urea Nitrogen   Date Value Ref Range Status   07/19/2021 13 7 - 30 mg/dL  Final   06/28/2021 11 7 - 30 mg/dL Final     Creatinine   Date Value Ref Range Status   07/19/2021 0.85 0.52 - 1.04 mg/dL Final   06/28/2021 0.67 0.52 - 1.04 mg/dL Final     GFR Estimate   Date Value Ref Range Status   07/19/2021 85 >60 mL/min/1.73m2 Final     Comment:     As of July 11, 2021, eGFR is calculated by the CKD-EPI creatinine equation, without race adjustment. eGFR can be influenced by muscle mass, exercise, and diet. The reported eGFR is an estimation only and is only applicable if the renal function is stable.   06/28/2021 >90 >60 mL/min/[1.73_m2] Final     Comment:     Non  GFR Calc  Starting 12/18/2018, serum creatinine based estimated GFR (eGFR) will be   calculated using the Chronic Kidney Disease Epidemiology Collaboration   (CKD-EPI) equation.       Calcium   Date Value Ref Range Status   07/19/2021 9.3 8.5 - 10.1 mg/dL Final   06/28/2021 8.9 8.5 - 10.1 mg/dL Final     Bilirubin Total   Date Value Ref Range Status   07/19/2021 0.7 0.2 - 1.3 mg/dL Final   06/28/2021 0.7 0.2 - 1.3 mg/dL Final     Alkaline Phosphatase   Date Value Ref Range Status   07/19/2021 56 40 - 150 U/L Final   06/28/2021 66 40 - 150 U/L Final     ALT   Date Value Ref Range Status   07/19/2021 33 0 - 50 U/L Final   06/28/2021 29 0 - 50 U/L Final     AST   Date Value Ref Range Status   07/19/2021 24 0 - 45 U/L Final   06/28/2021 21 0 - 45 U/L Final     Lab Results   Component Value Date    ALBUMIN 4.0 07/19/2021    ALBUMIN 4.0 06/28/2021     Lab Results   Component Value Date    PROTTOTAL 7.3 07/19/2021    PROTTOTAL 7.5 06/28/2021     Magnesium   Date Value Ref Range Status   07/19/2021 2.2 1.6 - 2.3 mg/dL Final   06/28/2021 2.0 1.6 - 2.3 mg/dL Final         Assessment:    Kayy Salazar is a 40 year old woman with a 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  She is currently undergoing adjuvant treatment with paclitaxel and carboplatin.  She presents for follow up and disease management.  In  light of the recent COVID19 pandemic, and in accordance with our group and national guidelines this patients care has been reviewed and it is felt that presenting for her visit would be more likely to increase rather than decrease her relative risks.  Therefore this visit was conducted by video.      60 minutes spent on the date of the encounter doing chart review, history and exam, documentation, and further activities as noted above.              Plan:     1.)        Ovarian cancer:  Will defer treatment a week as she does not feel well enough for chemo therapy and she is neutropenic with an ANC of 1.4.  Will arrange for her to have a virtual visit with Dr. Ch on Wed this week to discuss risk of discontinuing treatment.  Briefly discussed role of dose reduction to help with tolerance and potential to switch paclitaxel to taxotere in the presence of neuropathy.  Ok to proceed with cycle 4 of chemotherapy next week if they decide to continue treatment and if labs are WDL.  RTC 3 weeks after cycle 4 for labs, provider visit, and cycle 5 or as discussed per Dr. Ch.  Reviewed signs and symptoms for when she should contact the clinic or seek additional care.  Patient to contact the clinic with any questions or concerns in the interim.        Genetic risk factors were assessed and she was negative for mutations in BRCA 1/2.       Labs and/or tests ordered include:  CBC. CMP. Mag. .                2.)        Health maintenance:  Issues addressed today include following up with PCP for annual health maintenance and non-gynecologic issues.      3.)        Neuropathy:  Discussed that research has not identified one good treatment for neuropathy.  However, there are some things that work well for some people that she can try.  This includes vitamin B6 50 mg BID, L-glutamine 2,000 mg BID, exercise, massage, compression, and acupuncture. If the vitamin B6 and L-glutamine do not seem to help in a couple  weeks she can stop taking these.  As her symptoms have become so significant would consider switching paclitaxel to Taxotere.  Discussed the role of physical therapy in helping regain lost muscle strength.  As she is not open to this option at this point encouraged doing an Internet search of hand and arm exercises to try at home.     4.) Chemotherapy induced nausea: Encouraged to continue use of antiemetics if needed for nausea so she does not struggle with PO intake.  Continue to drink at least 64 oz of fluid a day to prevent dehydration.  Encouraged small frequent meals high in protein.       5.)        Fatigue:  Continue to try to stay active and getting exercise with frequent rest breaks to combat this.       6.)        Constipation:  She can continue to manage her constipation with Senakot 2 tabs twice per day and add in Miralax daily after chemo if needed.     7.)        Arthralgias:  Discussed that bone pain is a common symptoms after taxol. She can manage this with tylenol as needed and occasional ibuprofen but would not advise regular ibuprofen use.  She can also start plain Claritin daily which is usually helpful.     8.)        Neutropenia:  Today her ANC was 1.4.  Discussed that if she was feeling great we could continue with treatment today but as she is not feeling well and considering stopping treatment will hold 1 week.  She does not want to have Neulasta due concern for the medication itself and side effects so will remove this from her treatment plan.          Bhavani Torrez DNP, APRN, FNP-C  Nurse Practitioner   Division of Gynecologic Oncology  Pager: [phone]     CC  Patient Care Team:  [facility] as PCP - General (Clinic)  Karol Hairston MD as Assigned PCP  Luis Ch MD as MD (Gynecologic Oncology)  Karol Hairston MD as Referring Physician (Family Medicine)  Bhavani Torrez APRN CNP as Assigned Cancer Care Provider

## 2021-07-19 ENCOUNTER — VIRTUAL VISIT (OUTPATIENT)
Dept: ONCOLOGY | Facility: CLINIC | Age: 41
End: 2021-07-19
Payer: COMMERCIAL

## 2021-07-19 ENCOUNTER — LAB (OUTPATIENT)
Dept: LAB | Facility: CLINIC | Age: 41
End: 2021-07-19
Payer: COMMERCIAL

## 2021-07-19 VITALS
HEIGHT: 62 IN | BODY MASS INDEX: 19.14 KG/M2 | OXYGEN SATURATION: 100 % | DIASTOLIC BLOOD PRESSURE: 77 MMHG | TEMPERATURE: 98.4 F | WEIGHT: 104 LBS | SYSTOLIC BLOOD PRESSURE: 119 MMHG | HEART RATE: 58 BPM

## 2021-07-19 DIAGNOSIS — C56.9 OVARIAN CANCER, UNSPECIFIED LATERALITY (H): ICD-10-CM

## 2021-07-19 DIAGNOSIS — D70.1 CHEMOTHERAPY-INDUCED NEUTROPENIA (H): ICD-10-CM

## 2021-07-19 DIAGNOSIS — Z51.11 ENCOUNTER FOR ANTINEOPLASTIC CHEMOTHERAPY: ICD-10-CM

## 2021-07-19 DIAGNOSIS — C56.9 OVARIAN CANCER, UNSPECIFIED LATERALITY (H): Primary | ICD-10-CM

## 2021-07-19 DIAGNOSIS — T45.1X5A CHEMOTHERAPY-INDUCED NEUTROPENIA (H): ICD-10-CM

## 2021-07-19 LAB
ALBUMIN SERPL-MCNC: 4 G/DL (ref 3.4–5)
ALP SERPL-CCNC: 56 U/L (ref 40–150)
ALT SERPL W P-5'-P-CCNC: 33 U/L (ref 0–50)
ANION GAP SERPL CALCULATED.3IONS-SCNC: 3 MMOL/L (ref 3–14)
AST SERPL W P-5'-P-CCNC: 24 U/L (ref 0–45)
BASOPHILS # BLD AUTO: 0 10E3/UL (ref 0–0.2)
BASOPHILS NFR BLD AUTO: 1 %
BILIRUB SERPL-MCNC: 0.7 MG/DL (ref 0.2–1.3)
BUN SERPL-MCNC: 13 MG/DL (ref 7–30)
CALCIUM SERPL-MCNC: 9.3 MG/DL (ref 8.5–10.1)
CANCER AG125 SERPL-ACNC: 9 U/ML (ref 0–30)
CHLORIDE BLD-SCNC: 109 MMOL/L (ref 94–109)
CO2 SERPL-SCNC: 30 MMOL/L (ref 20–32)
CREAT SERPL-MCNC: 0.85 MG/DL (ref 0.52–1.04)
EOSINOPHIL # BLD AUTO: 0.1 10E3/UL (ref 0–0.7)
EOSINOPHIL NFR BLD AUTO: 1 %
ERYTHROCYTE [DISTWIDTH] IN BLOOD BY AUTOMATED COUNT: 15.5 % (ref 10–15)
GFR SERPL CREATININE-BSD FRML MDRD: 85 ML/MIN/1.73M2
GLUCOSE BLD-MCNC: 96 MG/DL (ref 70–99)
HCT VFR BLD AUTO: 33.8 % (ref 35–47)
HGB BLD-MCNC: 11.2 G/DL (ref 11.7–15.7)
IMM GRANULOCYTES # BLD: 0 10E3/UL
IMM GRANULOCYTES NFR BLD: 0 %
LYMPHOCYTES # BLD AUTO: 1.9 10E3/UL (ref 0.8–5.3)
LYMPHOCYTES NFR BLD AUTO: 52 %
MAGNESIUM SERPL-MCNC: 2.2 MG/DL (ref 1.6–2.3)
MCH RBC QN AUTO: 28.4 PG (ref 26.5–33)
MCHC RBC AUTO-ENTMCNC: 33.1 G/DL (ref 31.5–36.5)
MCV RBC AUTO: 86 FL (ref 78–100)
MONOCYTES # BLD AUTO: 0.3 10E3/UL (ref 0–1.3)
MONOCYTES NFR BLD AUTO: 7 %
NEUTROPHILS # BLD AUTO: 1.4 10E3/UL (ref 1.6–8.3)
NEUTROPHILS NFR BLD AUTO: 39 %
NRBC # BLD AUTO: 0 10E3/UL
NRBC BLD AUTO-RTO: 0 /100
PLATELET # BLD AUTO: 128 10E3/UL (ref 150–450)
POTASSIUM BLD-SCNC: 3.7 MMOL/L (ref 3.4–5.3)
PROT SERPL-MCNC: 7.3 G/DL (ref 6.8–8.8)
RBC # BLD AUTO: 3.95 10E6/UL (ref 3.8–5.2)
SODIUM SERPL-SCNC: 142 MMOL/L (ref 133–144)
WBC # BLD AUTO: 3.7 10E3/UL (ref 4–11)

## 2021-07-19 PROCEDURE — 86304 IMMUNOASSAY TUMOR CA 125: CPT

## 2021-07-19 PROCEDURE — 99215 OFFICE O/P EST HI 40 MIN: CPT | Mod: 95 | Performed by: NURSE PRACTITIONER

## 2021-07-19 PROCEDURE — 80053 COMPREHEN METABOLIC PANEL: CPT

## 2021-07-19 PROCEDURE — 83735 ASSAY OF MAGNESIUM: CPT

## 2021-07-19 PROCEDURE — 36415 COLL VENOUS BLD VENIPUNCTURE: CPT

## 2021-07-19 PROCEDURE — 85025 COMPLETE CBC W/AUTO DIFF WBC: CPT

## 2021-07-19 ASSESSMENT — MIFFLIN-ST. JEOR: SCORE: 1084.99

## 2021-07-19 ASSESSMENT — PAIN SCALES - GENERAL: PAINLEVEL: NO PAIN (0)

## 2021-07-19 NOTE — NURSING NOTE
Kayy is a 40 year old who is being evaluated via a billable video visit.      How would you like to obtain your AVS? MyChart  If the video visit is dropped, the invitation should be resent by: Text to cell phone: [phone]  Will anyone else be joining your video visit? No    Video-Visit Details    Type of service:  Video Visit    Originating Location (pt. Location): Other In clinic prior to tx    Distant Location (provider location):  [facility]     Platform used for Video Visit: MaidaWell    Neuropathy in right hand - has not gotten any better or worse    Denae Serna, CMA

## 2021-07-19 NOTE — LETTER
2021         RE: Kayy Salazar  [id number] [facility] [address]        Dear Colleague,    Thank you for referring your patient, Kayy Salazar, to the [facility]. Please see a copy of my visit note below.    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  She is currently undergoing adjuvant treatment with paclitaxel and carboplatin.  She presents for follow up and disease management.  In light of the recent COVID19 pandemic, and in accordance with our group and national guidelines this patients care has been reviewed and it is felt that presenting for her visit would be more likely to increase rather than decrease her relative risks.  Therefore this visit was conducted by video.             Oncology History:  Initially, she was seen for vomiting and nausea, reduced oral intake, some urinary frequency with abdominal distention over a couple months.  She also lost about 8 pounds in the last couple of weeks.      3/17/2021:  191.  CT AP  IMPRESSION:   1.  Left adnexal soft tissue mass associated with large volume ascites  and foci of peritoneal thickening is suspicious for ovarian/peritoneal  malignancy. Recommend gyne-oncology consultation.  2.  4.1 cm posterior uterine mass is likely a fibroid. Enhancing  nodule on the surface of the uterine fundus could be a subserosal  fibroid or peritoneal deposit.  3.  Focal ill-defined hypoenhancement in the liver, near the falciform  ligament, could be due to focal fatty infiltration versus peritoneal  deposit. Enhancing lesion in segment 8 is favored to be a  hemangioma  but needs confirmation with MRI.  4.  Subcentimeter pelvic lymph nodes are indeterminate.  [Access Center: Left adnexal soft tissue mass associated with large  volume ascites and foci of peritoneal thickening is suspicious for  ovarian/peritoneal malignancy. Recommend gyne-oncology consultation.     3/19/21: CT chest  IMPRESSION:   1. No pulmonary nodules or lymphadenopathy.  2. Area of bony sclerosis in the sternum, concern for metastatic  Disease.     3/19/21: MRI abdomen  IMPRESSION: In this patient with concern for left ovarian neoplastic  lesion, the current MRI scan shows:  1. Multiple hepatic lesions, including:-  a. Indeterminate segment 6 subcapsular lesion, noting lack of  retention on the hepatobiliary phase and T2 hyperintensity.  Possibility of a sclerosing hemangioma should be considered along with  possibility of small metastatic lesion (considered less likely). If  this subcapsular lesion is not evaluated in the operating room as part  of staging procedure, recommend three-month follow-up MRI exam using  Eovist as intravenous contrast.  b. Hypodensity along the falciform ligament seen on recent CT exam has  been characterized by this MRI exam as focal fatty infiltration.  c. Multiple benign lesions, including at least 3 focal nodular  hyperplasias and hemangioma.  2. Moderate to large volume ascites with mild omental thickening,  presumably malignant in the setting of ovarian malignancy. This is  better characterized on CT abdomen and pelvis exam.  3. Layering sludge in the gallbladder.     4/1/21: CEA <0.5. CA 19-9 9.     4/6/21:  Diagnostic laparoscopy with biopsies, converted to exploratory laparotomy, modified radical hysterectomy, bilateral salpingo-oophorectomy, bilateral ureterolysis, bladder peritonectomy, omentectomy, diaphragm stripping, liver mobilization, colon mobilization, small bowel serosa oversewing, resection of abdominal wall nodule  Partial hepatectomy performed by  Surgical Oncology (Dr. Zuñiga)   Optimal debulking to no residual macroscopic residual disease R0  FINAL DIAGNOSIS:   A. OVARY, RIGHT, BIOPSY:   - Serous borderline tumor   B. NODULE, CUL DE SAC, EXCISION:   - Positive for serous carcinoma, low grade   C. OMENTUM, BIOPSY:   - Positive for serous carcinoma   D. OVARY AND FALLOPIAN TUBE, LEFT, SALPINGO-OOPHORECTOMY:   - Serous carcinoma of the ovary, low grade on a background of a borderline    tumor   - Multiple follicular cysts   - Fallopian tube with serosal involvement by low grade serous carcinoma   E. UTERUS, CERVIX, RIGHT FALLOPIAN TUBE AND OVARY, HYSTERECTOMY WITH RIGHT    SALPINGO-OOPHORECTOMY:   - Secretory-type endometrium   - Leiomyomas and one adenomyoma   - Uterine serosal involvement by low grade serous carcinoma,   endometriosis/endosalpingiosis and fibrous   adhesions   - Ovary with surface serous borderline tumor and fibrous adhesions   - Fallopian tube serosal inflammation and adhesions   F. NODULE, CUL DE SAC, EXCISION #2:   - Serosal hyperplasia and calcification   - Fibrous adhesions   - Negative for malignancy   G. NODULE, LEFT PELVIC SIDEWALL, EXCISION:   - Fibroadipose tissue, positive for low grade serous carcinoma   H. FALCIFORM LIGAMENT, BIOPSY:   - Fibroadipose tissue with endosalpingiosis   - Negative for malignancy   I. OMENTUM, OMENTECTOMY:   - Omental adipose tissue, positive for low grade serous carcinoma   (non-invasive implants)   - Omental lymph node positive for metastatic low grade serous carcinoma,   and endosalpingiosis   J. NODULE, RIGHT LOWER QUADRANT ABDOMINAL WALL, EXCISION:   - Fibroadipose tissue, positive for low grade serous carcinoma   K. RIGHT EVERETT-DIAPHRAGM PERITONEUM, EXCISION:   - Fibroadipose tissue, positive for low grade serous carcinoma   - Endosalpingiosis, calcifications and fibrous adhesions   L. NODULE, LIVER SURFACE, EXCISION:   - Hepatic parenchyma and capsular endosalpingiosis   - Negative for malignancy    M. NODULE, SMALL BOWEL SEROSAL, EXCISION:   - Serosa with inflammation, necrosis and mesothelial hyperplasia   - Negative for malignancy   N. NODULE, SMALL BOWEL MESENTERY, EXCISION:   - Fibroadipose tissue, positive for low grade serous carcinoma   - Serosa with inflammation, necrosis and mesothelial hyperplasia   O. NODULE, SMALL BOWEL MESENTERY, EXCISION #2:   - Serosa with inflammation, necrosis and mesothelial hyperplasia   P. LIVER, PARTIAL LEFT HEPATECTOMY LEVEL 3:   - Focal nodular hyperplasia, FNH; negative for malignancy     (with typical trichrome stain and map-like pattern on glutamine   synthetase immunohistochemistry, with   appropriate controls)      Plan: Carboplatin AUC 6 and paclitaxel 175 mg/m2 x 6 cycles.     5/10/21: Cycle 1 carboplatin and paclitaxel.   14.  6/2/21: Cycle 2 carboplatin and paclitaxel.   8.  6/23/21: Cycle 3 carboplatin and paclitaxel.  Deferred for neutropenia given on 6/28.  Refused Neulasta.   9.  7/19/21: Cycle 4 carboplatin and paclitaxel.   pending.                Today she comes to clinic not feeling well and with a few concerns.  She reports that she has continued with fatigue, bone pain, and aching in her whole body since her last cycle of chemo.  She has not tried anything to help with this.  She continues to struggle with nausea for about a week after chemo.  This cycle she tried not taking compazine due to constipation.  She did have a few days of constipation after chemo instead of the 5+ days she was having.  She was able to continue to eat and drink during this time but admits that it was a struggle.   She is also having significant numbness in her right hand that is extending up to her elbow.  This is pretty constant.  She has noticed decreased strength in her right hand.  She is not interested in physical therapy at this point.  She is not interested in Neulasta due to side effects and concern for the medication itself.  She denies  changes in her bladder habits, no emesis, no lower extremity edema, and no  fevers or chills, and no chest pain or shortness of breath.  She does not feel ready for chemo today and is considering stopping treatment.  She is wondering what the risks of that would be.                Review of Systems:    Systemic           no weight changes; no fever; no chills; no night sweats; + decreased appetite   Skin           no rashes, or lesions  Eye           no irritation; no changes in vision  Tiffany-Laryngeal           no dysphagia; no hoarseness   Pulmonary    no cough; no shortness of breath  Cardiovascular    no chest pain; no palpitations  Gastrointestinal    no diarrhea; + constipation; no abdominal pain; + changes in bowel habits; no blood in stool  Genitourinary   no urinary frequency; no urinary urgency; no dysuria; no pain; no abnormal vaginal discharge; no abnormal vaginal bleeding  Breast   no breast discharge; no breast changes; no breast pain  Musculoskeletal    + myalgias; + arthralgias; no back pain  Psychiatric           no depressed mood; no anxiety    Hematologic   no tender lymph nodes; no noticeable swellings or lumps   Endocrine    no hot flashes; no heat/cold intolerance         Neurological   no tremor; + numbness and tingling; no headaches; no difficulty sleeping      Past Medical History:    Past Medical History:   Diagnosis Date     Endometriosis          Past Surgical History:    Past Surgical History:   Procedure Laterality Date     GYN SURGERY      c section      HC TOOTH EXTRACTION W/FORCEP       HEPATECTOMY PARTIAL  4/6/2021    Procedure: Partial left hepatectomy level 3;  Surgeon: Chris Zuñiga MD;  Location: [facility] OR     LAPAROSCOPIC ABLATION ENDOMETRIOSIS      2007     LAPAROSCOPY DIAGNOSTIC (GYN) N/A 4/6/2021    Procedure: Diagnostic laparoscopy with biopsies converted to exploratory laparotmy, modified radical hysterectomy bilateral salpingo-oophorectomy, bilateral ureterolysis,  peritonectomy, omentectomy, resection of liver nodule, diapraghm stripping, liver mobilization, colon mobilization, resection of abdominal wall nodule, tumor debulking 2R0, no macroscopic residual disease;  Surgeon: Ben Ch         Health Maintenance Due   Topic Date Due     PREVENTIVE CARE VISIT  Never done     ADVANCE CARE PLANNING  Never done     Pneumococcal Vaccine: Pediatrics (0 to 5 Years) and At-Risk Patients (6 to 64 Years) (1 of 4 - PCV13) Never done     COVID-19 Vaccine (1) Never done     HIV SCREENING  Never done     HEPATITIS C SCREENING  Never done     DTAP/TDAP/TD IMMUNIZATION (1 - Tdap) Never done     PAP  03/01/2016       Current Medications:     Current Outpatient Medications   Medication Sig Dispense Refill     acetaminophen (TYLENOL) 325 MG tablet Take 2 tablets (650 mg) by mouth every 6 hours 60 tablet 0     prochlorperazine (COMPAZINE) 10 MG tablet Take 1 tablet (10 mg) by mouth every 6 hours as needed (nausea/vomiting) 30 tablet 5     senna-docusate (SENOKOT-S/PERICOLACE) 8.6-50 MG tablet Take 2 tablets by mouth 2 times daily as needed for constipation 60 tablet 0         Allergies:        Allergies   Allergen Reactions     Amoxicillin Hives     Full body hives after amoxacillin & Vicodin after wisdom teeth pulled when around 27 yo.     Vicodin [Hydrocodone-Acetaminophen] Rash        Social History:     Social History     Tobacco Use     Smoking status: Never Smoker     Smokeless tobacco: Never Used   Substance Use Topics     Alcohol use: No       History   Drug Use No         Family History:     The patient's family history is notable for:    Family History   Problem Relation Age of Onset     Blood Disease Mother         hemachromatosis      Diabetes Maternal Grandmother      Alzheimer Disease Maternal Grandmother      Multiple Sclerosis Maternal Grandmother      Pancreatic Cancer Paternal Grandmother      Leukemia Paternal Grandfather      Anesthesia Reaction No family hx of      Deep  "Vein Thrombosis (DVT) No family hx of          Physical Exam:     /77 (BP Location: Left arm, Patient Position: Chair, Cuff Size: Adult Small)   Pulse 58   Temp 98.4  F (36.9  C) (Oral)   Ht 1.575 m (5' 2\")   Wt 47.2 kg (104 lb)   LMP 03/11/2021   SpO2 100%   BMI 19.02 kg/m    Body mass index is 19.02 kg/m .      General Appearance: healthy and alert, no distress    Eyes:  Eyes grossly normal to inspection.  No discharge or erythema, or obvious scleral/conjunctival abnormalities.    Respiratory: No audible wheeze, cough, or visible cyanosis.  No visible retractions or increased work of breathing.     Musculoskeletal: extremities non tender and without edema    Skin: no lesions or rashes on visible skin    Neurological: normal gait, no gross defects     Psychiatric: appropriate mood and affect. Mentation appears normal, affect normal/bright, judgement and insight intact, normal speech and appearance well-groomed                            The rest of a comprehensive physical examination is deferred due to PHE (public health emergency) video visit restrictions.    Lab Results   Component Value Date    WBC 3.7 07/19/2021    WBC 4.1 06/28/2021     Lab Results   Component Value Date    RBC 3.95 07/19/2021    RBC 3.98 06/28/2021     Lab Results   Component Value Date    HGB 11.2 07/19/2021    HGB 11.0 06/28/2021     Lab Results   Component Value Date    HCT 33.8 07/19/2021    HCT 34.5 06/28/2021     No components found for: MCT  Lab Results   Component Value Date    MCV 86 07/19/2021    MCV 87 06/28/2021     Lab Results   Component Value Date    MCH 28.4 07/19/2021    MCH 27.6 06/28/2021     Lab Results   Component Value Date    MCHC 33.1 07/19/2021    MCHC 31.9 06/28/2021     Lab Results   Component Value Date    RDW 15.5 07/19/2021    RDW 16.2 06/28/2021     Lab Results   Component Value Date     07/19/2021     06/28/2021     % Neutrophils   Date Value Ref Range Status   07/19/2021 39 % Final "   06/28/2021 42.0 % Final     % Lymphocytes   Date Value Ref Range Status   07/19/2021 52 % Final   06/28/2021 52.0 % Final     % Monocytes   Date Value Ref Range Status   07/19/2021 7 % Final   06/28/2021 5.0 % Final     % Eosinophils   Date Value Ref Range Status   07/19/2021 1 % Final   06/28/2021 1.0 % Final     % Basophils   Date Value Ref Range Status   07/19/2021 1 % Final   06/02/2021 1.2 % Final     % Immature Granulocytes   Date Value Ref Range Status   06/02/2021 0.4 % Final     Absolute Neutrophil   Date Value Ref Range Status   06/28/2021 1.7 1.6 - 8.3 10e9/L Final     Absolute Lymphocytes   Date Value Ref Range Status   06/28/2021 2.2 0.8 - 5.3 10e9/L Final     Absolute Monocytes   Date Value Ref Range Status   06/28/2021 0.2 0.0 - 1.3 10e9/L Final     Absolute Eosinophils   Date Value Ref Range Status   06/28/2021 0.0 0.0 - 0.7 10e9/L Final     Absolute Basophils   Date Value Ref Range Status   06/02/2021 0.1 0.0 - 0.2 10e9/L Final     Abs Immature Granulocytes   Date Value Ref Range Status   06/02/2021 0.0 0 - 0.4 10e9/L Final     Absolute Immature Granulocytes   Date Value Ref Range Status   07/19/2021 0.0 <=0.0 10e3/uL Final     Nucleated RBCs   Date Value Ref Range Status   03/20/2021 0 0 /100 Final     Absolute Nucleated RBC   Date Value Ref Range Status   03/20/2021 0.0  Final     Last Comprehensive Metabolic Panel:  Sodium   Date Value Ref Range Status   07/19/2021 142 133 - 144 mmol/L Final   06/28/2021 140 133 - 144 mmol/L Final     Potassium   Date Value Ref Range Status   07/19/2021 3.7 3.4 - 5.3 mmol/L Final   06/28/2021 4.1 3.4 - 5.3 mmol/L Final     Chloride   Date Value Ref Range Status   07/19/2021 109 94 - 109 mmol/L Final   06/28/2021 108 94 - 109 mmol/L Final     Carbon Dioxide   Date Value Ref Range Status   06/28/2021 29 20 - 32 mmol/L Final     Carbon Dioxide (CO2)   Date Value Ref Range Status   07/19/2021 30 20 - 32 mmol/L Final     Anion Gap   Date Value Ref Range Status    07/19/2021 3 3 - 14 mmol/L Final   06/28/2021 3 3 - 14 mmol/L Final     Glucose   Date Value Ref Range Status   07/19/2021 96 70 - 99 mg/dL Final   06/28/2021 89 70 - 99 mg/dL Final     Urea Nitrogen   Date Value Ref Range Status   07/19/2021 13 7 - 30 mg/dL Final   06/28/2021 11 7 - 30 mg/dL Final     Creatinine   Date Value Ref Range Status   07/19/2021 0.85 0.52 - 1.04 mg/dL Final   06/28/2021 0.67 0.52 - 1.04 mg/dL Final     GFR Estimate   Date Value Ref Range Status   07/19/2021 85 >60 mL/min/1.73m2 Final     Comment:     As of July 11, 2021, eGFR is calculated by the CKD-EPI creatinine equation, without race adjustment. eGFR can be influenced by muscle mass, exercise, and diet. The reported eGFR is an estimation only and is only applicable if the renal function is stable.   06/28/2021 >90 >60 mL/min/[1.73_m2] Final     Comment:     Non  GFR Calc  Starting 12/18/2018, serum creatinine based estimated GFR (eGFR) will be   calculated using the Chronic Kidney Disease Epidemiology Collaboration   (CKD-EPI) equation.       Calcium   Date Value Ref Range Status   07/19/2021 9.3 8.5 - 10.1 mg/dL Final   06/28/2021 8.9 8.5 - 10.1 mg/dL Final     Bilirubin Total   Date Value Ref Range Status   07/19/2021 0.7 0.2 - 1.3 mg/dL Final   06/28/2021 0.7 0.2 - 1.3 mg/dL Final     Alkaline Phosphatase   Date Value Ref Range Status   07/19/2021 56 40 - 150 U/L Final   06/28/2021 66 40 - 150 U/L Final     ALT   Date Value Ref Range Status   07/19/2021 33 0 - 50 U/L Final   06/28/2021 29 0 - 50 U/L Final     AST   Date Value Ref Range Status   07/19/2021 24 0 - 45 U/L Final   06/28/2021 21 0 - 45 U/L Final     Lab Results   Component Value Date    ALBUMIN 4.0 07/19/2021    ALBUMIN 4.0 06/28/2021     Lab Results   Component Value Date    PROTTOTAL 7.3 07/19/2021    PROTTOTAL 7.5 06/28/2021     Magnesium   Date Value Ref Range Status   07/19/2021 2.2 1.6 - 2.3 mg/dL Final   06/28/2021 2.0 1.6 - 2.3 mg/dL Final          Assessment:    Kayy Salazar is a 40 year old woman with a 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  She is currently undergoing adjuvant treatment with paclitaxel and carboplatin.  She presents for follow up and disease management.  In light of the recent COVID19 pandemic, and in accordance with our group and national guidelines this patients care has been reviewed and it is felt that presenting for her visit would be more likely to increase rather than decrease her relative risks.  Therefore this visit was conducted by video.      60 minutes spent on the date of the encounter doing chart review, history and exam, documentation, and further activities as noted above.              Plan:     1.)        Ovarian cancer:  Will defer treatment a week as she does not feel well enough for chemo therapy and she is neutropenic with an ANC of 1.4.  Will arrange for her to have a virtual visit with Dr. Ch on Wed this week to discuss risk of discontinuing treatment.  Briefly discussed role of dose reduction to help with tolerance and potential to switch paclitaxel to taxotere in the presence of neuropathy.  Ok to proceed with cycle 4 of chemotherapy next week if they decide to continue treatment and if labs are WDL.  RTC 3 weeks after cycle 4 for labs, provider visit, and cycle 5 or as discussed per Dr. Ch.  Reviewed signs and symptoms for when she should contact the clinic or seek additional care.  Patient to contact the clinic with any questions or concerns in the interim.        Genetic risk factors were assessed and she was negative for mutations in BRCA 1/2.       Labs and/or tests ordered include:  CBC. CMP. Mag. .                2.)        Health maintenance:  Issues addressed today include following up with PCP for annual health maintenance and non-gynecologic issues.      3.)        Neuropathy:  Discussed that research has not identified one good treatment for neuropathy.   However, there are some things that work well for some people that she can try.  This includes vitamin B6 50 mg BID, L-glutamine 2,000 mg BID, exercise, massage, compression, and acupuncture. If the vitamin B6 and L-glutamine do not seem to help in a couple weeks she can stop taking these.  As her symptoms have become so significant would consider switching paclitaxel to Taxotere.  Discussed the role of physical therapy in helping regain lost muscle strength.  As she is not open to this option at this point encouraged doing an Internet search of hand and arm exercises to try at home.     4.) Chemotherapy induced nausea: Encouraged to continue use of antiemetics if needed for nausea so she does not struggle with PO intake.  Continue to drink at least 64 oz of fluid a day to prevent dehydration.  Encouraged small frequent meals high in protein.       5.)        Fatigue:  Continue to try to stay active and getting exercise with frequent rest breaks to combat this.       6.)        Constipation:  She can continue to manage her constipation with Senakot 2 tabs twice per day and add in Miralax daily after chemo if needed.     7.)        Arthralgias:  Discussed that bone pain is a common symptoms after taxol. She can manage this with tylenol as needed and occasional ibuprofen but would not advise regular ibuprofen use.  She can also start plain Claritin daily which is usually helpful.     8.)        Neutropenia:  Today her ANC was 1.4.  Discussed that if she was feeling great we could continue with treatment today but as she is not feeling well and considering stopping treatment will hold 1 week.  She does not want to have Neulasta due concern for the medication itself and side effects so will remove this from her treatment plan.          Bhavani Torrez, DNP, APRN, FNP-C  Nurse Practitioner   Division of Gynecologic Oncology  Pager: [phone]     CC  Patient Care Team:  Francis Villafana as PCP - General  (Clinic)  Karol Hairston MD as Assigned PCP  Luis Ch MD as MD (Gynecologic Oncology)  Karol Hairston MD as Referring Physician (Family Medicine)  Bhavani Torrez APRN CNP as Assigned Cancer Care Provider          Again, thank you for allowing me to participate in the care of your patient.        Sincerely,        PAOLO Wasserman CNP

## 2021-07-19 NOTE — PATIENT INSTRUCTIONS
For your neuropathy you can try:      Exercise    Message    Vitamin B6 50 mg twice a day    L-glutamine 2000 mg twice a day, can be purchased at [employer] or [facility]    Acupuncture and laser treatments with a program such as [facility].    Acupuncture Locations    Below is a list of acupuncture resources we have in and around the [facility] area. Some are cash pay and some accept insurance. You will need to check with your insurance to verify coverage.     [facility]   [address]   [place], [place]   [phone]     [facility], Private practice  [place], [place]   Provider: Christiana Roach   [phone]   CASH only practice- Can use health spending account     [facility]  - Private practice  [place], [place]   Provider: Owen Sheehan   [phone]   Accepts insurance: [employer], [employer], [email or web address], [facility], Medical Assistance   - Specializing in care for Austrian and Hmong communities.     [facility], Private practice   [place], [place]   [phone]  Provider: Kiki Aguilar   Accepts insurance: [employer] & BCBS, private pay, health spending account     [facility], Private practice    [place], [place]   Provider: Anette Gtz   [phone]  CASH only practice- Can use health spending account     [facility]    [place] & [place], [place]   Provider: Sanjana Rodriguez   [phone]  Takes all major insurance     [place], [place]   Provider: Josh Mejia   [phone]  Takes all insurance/private pay     [facility]    [place], [place]   Provider: Dr Gio Pineda   [phone]    [facility]    [place], [place]   [phone]  Takes [employer] and [employer]     [facility]    [place], [place]   Provider: Brittany Crouch   [phone]  Takes insurance/private pay     [facility] [place] [id number] [facility] [address]    [place] [id number]   [phone]

## 2021-07-20 ENCOUNTER — VIRTUAL VISIT (OUTPATIENT)
Dept: ONCOLOGY | Facility: CLINIC | Age: 41
End: 2021-07-20
Attending: GENETIC COUNSELOR, MS
Payer: COMMERCIAL

## 2021-07-20 DIAGNOSIS — C56.9 OVARIAN CANCER, UNSPECIFIED LATERALITY (H): Primary | ICD-10-CM

## 2021-07-20 PROCEDURE — 999N000069 HC STATISTIC GENETIC COUNSELING, < 16 MIN: Mod: GT | Performed by: GENETIC COUNSELOR, MS

## 2021-07-20 NOTE — PROGRESS NOTES
"July 20, 2021    Kayy is a 40 year old who is being evaluated via a billable video visit.      Video-Visit Details    Type of service: Video Visit    Video Start Time: 01:16 pm   Video End Time: 01:23 pm    Originating Location (pt. Location): Home    Distant Location (provider location): Cancer Risk Management Program    Platform used for Video Visit: [facility]    Referring Provider: Luis Ch MD     Presenting Information:   I spoke with Kayy over video to discuss her additional primary and secondary genetic analysis results. This genetic data analysis was done as part of the [facility] Program in Ovarian Cancer. Testing for BRCA1 and BRCA2 was previously completed and was negative (please see visit note from 6/22/21 for details).       EXPANDED CANCER RISK PANEL RESULTS: NEGATIVE  A genetic cause of Kayy's ovarian cancer was not identified. No clinically significant sequence or copy number variants were detected in the additional 37 genes analyzed: APC, LIMA, AXIN2, BARD1, BMPR1A, BRIP1, CDH1, CHEK2, DICER1, EPCAM, FANCC, FANCM, GREM1, MLH1, MRE11, MSH2, MSH3, MSH6, MUTYH, NBN, NF1, NTHL1, PALB2, PMS2, POLD1, POLE, PTEN, RAD50, RAD51, RAD51C, RAD51D, SMAD4, SMARCA4, STK11, TP53, XRCC2, XRCC3.     Of note, no harmful mutations were detected in the BRCA1 and BRCA2 genes. These results were previously discussed on 6/22/21.     A copy of the test report can be found in the Laboratory tab, dated 6/7/21, and named \"HEREDITARY CANCER BRCA1/BRCA2\".      Interpretation:  There are several different interpretations of this negative test result.    1. One explanation may be that there is a different gene or combination of genes and environment that are associated with the cancers in Kayy and/or her relatives.  2. It is possible that her relatives have a mutation in one of these genes and she did not inherit it.  3. There is also a small possibility that there is a mutation in one of these genes, " and the testing laboratory could not find it with their current testing methods.     Screening:  Based on this negative test result, it is important for Kayy and her relatives to refer back to the family history for appropriate cancer screening.      She should continue with her ovarian cancer treatment as recommended by her oncology team.    Other population cancer screening options, such as those recommended by the American Cancer Society and the National Comprehensive Cancer Network (NCCN), are also appropriate for Kayy and her family. These screening recommendations may change if there are changes to Kayy's personal and/or family history of cancer. Final screening recommendations should be made by each individual's managing physician.    Due to Kayy's personal history of ovarian cancer, other close female relatives remain at slightly increased risk for ovarian cancer. Ovarian cancer screening (pelvic exams, CA-125 blood tests, and transvaginal ultrasounds) is available; however, there are significant limitations of this screening. As such, this screening is not typically recommended. That being said, women in this family should discuss this screening and the signs and symptoms of ovarian cancer with their primary OB/GYN provider, as they may have individualized recommendations.     Inheritance:  Mutations in these genes are inherited in an autosomal dominant manner. Kayy cannot pass on an identifiable mutation in these genes to her children based on this negative test result. Mutations in these genes do not skip generations.        Secondary Results:  As part of the Precision Medicine Program in Ovarian Cancer, 54 medically significant genes were also analyzed (sequencing only, copy number variation analysis was not performed). These results were also NEGATIVE. Details can be found in the same report in the Laboratory tab.    Additional Testing Considerations:  Although Kayy's genetic testing  result was negative, other relatives may still carry a gene mutation associated with an increased risk for cancer. Based on her paternal family history of pancreatic cancer and Ashkenazi Muslim ancestry, genetic counseling is recommended for her father and paternal relatives to discuss genetic testing options. Additionally, based on her maternal family history of stomach cancer, genetic counseling is also recommended for her mother and maternal relatives to discuss testing options. If any of these relatives do pursue genetic testing, Kayy is encouraged to contact me so that we may review the impact of their test results on her.    Summary:  We do not have an explanation for Kayy's ovarian cancer based on the genetic analysis results from the [facility] Program in Ovarian Cancer. Because of that, it is important that she continue with cancer screening based on her personal and family history as discussed above.     Genetic testing is rapidly advancing, and new cancer susceptibility genes will most likely be identified in the future. Kayy is encouraged to contact me annually or if there are changes in her personal or family history. This may change how we assess her cancer risk, screening, and the testing we would offer.     Plan:  1. A copy of her test results will be mailed to her along with this clinic note.  2. Kayy is encouraged to contact me with any questions or concerns at [phone].      Jany Kimbrough MS, [facility]  Licensed, Certified Genetic Counselor  Office: [phone]  Email: [email or web address]

## 2021-07-20 NOTE — LETTER
Cancer Risk Management  Program Locations    [facility]  [facility]  [facility]  [facility]  Mailing Address  Cancer Risk Management Program  [facility]  [address]  [facility] [address]  [place] [address]    New patient appointments  [phone]  July 25, 2021    Kayy Salazar  [address] [facility] [address]      Dear Kayy,    It was a pleasure speaking with you over video for genetic counseling on 7/20/21. Here is a copy of the progress note from our discussion. If you have any additional questions, please feel free to call.    Referring Provider: Luis Ch MD     Presenting Information:   I spoke with Kayy over video to discuss her additional primary and secondary genetic analysis results. This genetic data analysis was done as part of the Precision Medicine Program in Ovarian Cancer. Testing for BRCA1 and BRCA2 was previously completed and was negative (please see visit note from 6/22/21 for details).       EXPANDED CANCER RISK PANEL RESULTS: NEGATIVE  A genetic cause of aKyy's ovarian cancer was not identified. No clinically significant sequence or copy number variants were detected in the additional 37 genes analyzed: APC, LIMA, AXIN2, BARD1, BMPR1A, BRIP1, CDH1, CHEK2, DICER1, EPCAM, FANCC, FANCM, GREM1, MLH1, MRE11, MSH2, MSH3, MSH6, MUTYH, NBN, NF1, NTHL1, PALB2, PMS2, POLD1, POLE, PTEN, RAD50, RAD51, RAD51C, RAD51D, SMAD4, SMARCA4, STK11, TP53, XRCC2, XRCC3.     Of note, no harmful mutations were detected in the BRCA1 and BRCA2 genes. These results were previously discussed on 6/22/21.     Interpretation:  There are several different interpretations of this negative test result.    1. One explanation may be that there is a different gene or combination of genes and environment that are associated with the cancers in Kayy  and/or her relatives.  2. It is possible that her relatives have a mutation in one of these genes and she did not inherit it.  3. There is also a small possibility that there is a mutation in one of these genes, and the testing laboratory could not find it with their current testing methods.     Screening:  Based on this negative test result, it is important for Kayy and her relatives to refer back to the family history for appropriate cancer screening.      She should continue with her ovarian cancer treatment as recommended by her oncology team.    Other population cancer screening options, such as those recommended by the American Cancer Society and the National Comprehensive Cancer Network (NCCN), are also appropriate for Kayy and her family. These screening recommendations may change if there are changes to Kayy's personal and/or family history of cancer. Final screening recommendations should be made by each individual's managing physician.    Due to Kayy's personal history of ovarian cancer, other close female relatives remain at slightly increased risk for ovarian cancer. Ovarian cancer screening (pelvic exams, CA-125 blood tests, and transvaginal ultrasounds) is available; however, there are significant limitations of this screening. As such, this screening is not typically recommended. That being said, women in this family should discuss this screening and the signs and symptoms of ovarian cancer with their primary OB/GYN provider, as they may have individualized recommendations.     Inheritance:  Mutations in these genes are inherited in an autosomal dominant manner. Kayy cannot pass on an identifiable mutation in these genes to her children based on this negative test result. Mutations in these genes do not skip generations.        Secondary Results:  As part of the Precision Medicine Program in Ovarian Cancer, 54 medically significant genes were also analyzed (sequencing only, copy number  variation analysis was not performed). These results were also NEGATIVE. Details can be found in the same report in the Laboratory tab.    Additional Testing Considerations:  Although Kayy's genetic testing result was negative, other relatives may still carry a gene mutation associated with an increased risk for cancer. Based on her paternal family history of pancreatic cancer and Ashkenazi Confucianist ancestry, genetic counseling is recommended for her father and paternal relatives to discuss genetic testing options. Additionally, based on her maternal family history of stomach cancer, genetic counseling is also recommended for her mother and maternal relatives to discuss testing options. If any of these relatives do pursue genetic testing, Kayy is encouraged to contact me so that we may review the impact of their test results on her.    Summary:  We do not have an explanation for Kayy's ovarian cancer based on the genetic analysis results from the Prec[facility] Medicine Program in Ovarian Cancer. Because of that, it is important that she continue with cancer screening based on her personal and family history as discussed above.     Genetic testing is rapidly advancing, and new cancer susceptibility genes will most likely be identified in the future. Kayy is encouraged to contact me annually or if there are changes in her personal or family history. This may change how we assess her cancer risk, screening, and the testing we would offer.     Plan:  1. A copy of her test results will be mailed to her along with this clinic note.  2. Kayy is encouraged to contact me with any questions or concerns at [phone].      Jany Kimbrough MS, [facility]  Licensed, Certified Genetic Counselor  Office: [phone]  Email: [email or web address]

## 2021-07-20 NOTE — Clinical Note
Please send copy of letter to patient with test results. Please enclose test results: Hereditary cancer BRCA1/BRCA2 [[id number]] (Order [id number])

## 2021-07-21 ENCOUNTER — VIRTUAL VISIT (OUTPATIENT)
Dept: ONCOLOGY | Facility: CLINIC | Age: 41
End: 2021-07-21
Attending: OBSTETRICS & GYNECOLOGY
Payer: COMMERCIAL

## 2021-07-21 DIAGNOSIS — Z85.43 HISTORY OF OVARIAN CANCER: Primary | ICD-10-CM

## 2021-07-21 PROCEDURE — 99214 OFFICE O/P EST MOD 30 MIN: CPT | Mod: 95 | Performed by: OBSTETRICS & GYNECOLOGY

## 2021-07-21 NOTE — LETTER
2021     RE: Kayy Salazar  [address] [facility] [address]        Dear Colleague,    Thank you for referring your patient, Kayy Salazar, to the [facility]. Please see a copy of my visit note below.    Kayy is a 40 year old who is being evaluated via a billable video visit.      How would you like to obtain your AVS? MyChart  If the video visit is dropped, the invitation should be resent by: Send to e-mail at: [email or web address]  Will anyone else be joining your video visit? No  .    BINH Oseguera    Video-Visit Details    40 minutes                Follow Up Notes on Referred Patient    Date: 2021     Dr. 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 She has been having a more difficult time to recover from her chemotherapy with subsequent cycles. Some nausea, no vomiting, fever or chills. Normal urinary and bowel function. She still does have some vaginal spotting. No B-symtoms.     Oncology History:  Initially, she was seen for vomiting and nausea, reduced oral intake, some urinary frequency with abdominal distention over a couple months.  She also lost about 8 pounds in the last couple of weeks.      3/17/2021:  191.  CT AP  IMPRESSION:   1.  Left adnexal soft tissue mass associated with large volume ascites  and foci of peritoneal thickening is suspicious for ovarian/peritoneal  malignancy. Recommend gyne-oncology consultation.  2.  4.1 cm posterior uterine mass is likely a fibroid. Enhancing  nodule on the surface of the uterine fundus could be a subserosal  fibroid or peritoneal deposit.  3.  Focal ill-defined hypoenhancement in the liver, near the falciform  ligament, could be due to focal fatty infiltration versus peritoneal  deposit. Enhancing lesion in segment 8 is favored to be  a hemangioma  but needs confirmation with MRI.  4.  Subcentimeter pelvic lymph nodes are indeterminate.  [Access Center: Left adnexal soft tissue mass associated with large  volume ascites and foci of peritoneal thickening is suspicious for  ovarian/peritoneal malignancy. Recommend gyne-oncology consultation.     3/19/21: CT chest  IMPRESSION:   1. No pulmonary nodules or lymphadenopathy.  2. Area of bony sclerosis in the sternum, concern for metastatic  Disease.     3/19/21: MRI abdomen  IMPRESSION: In this patient with concern for left ovarian neoplastic  lesion, the current MRI scan shows:  1. Multiple hepatic lesions, including:-  a. Indeterminate segment 6 subcapsular lesion, noting lack of  retention on the hepatobiliary phase and T2 hyperintensity.  Possibility of a sclerosing hemangioma should be considered along with  possibility of small metastatic lesion (considered less likely). If  this subcapsular lesion is not evaluated in the operating room as part  of staging procedure, recommend three-month follow-up MRI exam using  Eovist as intravenous contrast.  b. Hypodensity along the falciform ligament seen on recent CT exam has  been characterized by this MRI exam as focal fatty infiltration.  c. Multiple benign lesions, including at least 3 focal nodular  hyperplasias and hemangioma.  2. Moderate to large volume ascites with mild omental thickening,  presumably malignant in the setting of ovarian malignancy. This is  better characterized on CT abdomen and pelvis exam.  3. Layering sludge in the gallbladder.     4/1/21: CEA <0.5. CA 19-9 9.     4/6/21:  Diagnostic laparoscopy with biopsies, converted to exploratory laparotomy, modified radical hysterectomy, bilateral salpingo-oophorectomy, bilateral ureterolysis, bladder peritonectomy, omentectomy, diaphragm stripping, liver mobilization, colon mobilization, small bowel serosa oversewing, resection of abdominal wall nodule  Partial hepatectomy performed by  Surgical Oncology (Dr. Zuñiga)   Optimal debulking to no residual macroscopic residual disease R0  FINAL DIAGNOSIS:   A. OVARY, RIGHT, BIOPSY:   - Serous borderline tumor   B. NODULE, CUL DE SAC, EXCISION:   - Positive for serous carcinoma, low grade   C. OMENTUM, BIOPSY:   - Positive for serous carcinoma   D. OVARY AND FALLOPIAN TUBE, LEFT, SALPINGO-OOPHORECTOMY:   - Serous carcinoma of the ovary, low grade on a background of a borderline    tumor   - Multiple follicular cysts   - Fallopian tube with serosal involvement by low grade serous carcinoma   E. UTERUS, CERVIX, RIGHT FALLOPIAN TUBE AND OVARY, HYSTERECTOMY WITH RIGHT    SALPINGO-OOPHORECTOMY:   - Secretory-type endometrium   - Leiomyomas and one adenomyoma   - Uterine serosal involvement by low grade serous carcinoma,   endometriosis/endosalpingiosis and fibrous   adhesions   - Ovary with surface serous borderline tumor and fibrous adhesions   - Fallopian tube serosal inflammation and adhesions   F. NODULE, CUL DE SAC, EXCISION #2:   - Serosal hyperplasia and calcification   - Fibrous adhesions   - Negative for malignancy   G. NODULE, LEFT PELVIC SIDEWALL, EXCISION:   - Fibroadipose tissue, positive for low grade serous carcinoma   H. FALCIFORM LIGAMENT, BIOPSY:   - Fibroadipose tissue with endosalpingiosis   - Negative for malignancy   I. OMENTUM, OMENTECTOMY:   - Omental adipose tissue, positive for low grade serous carcinoma   (non-invasive implants)   - Omental lymph node positive for metastatic low grade serous carcinoma,   and endosalpingiosis   J. NODULE, RIGHT LOWER QUADRANT ABDOMINAL WALL, EXCISION:   - Fibroadipose tissue, positive for low grade serous carcinoma   K. RIGHT EVERETT-DIAPHRAGM PERITONEUM, EXCISION:   - Fibroadipose tissue, positive for low grade serous carcinoma   - Endosalpingiosis, calcifications and fibrous adhesions   L. NODULE, LIVER SURFACE, EXCISION:   - Hepatic parenchyma and capsular endosalpingiosis   - Negative for malignancy    M. NODULE, SMALL BOWEL SEROSAL, EXCISION:   - Serosa with inflammation, necrosis and mesothelial hyperplasia   - Negative for malignancy   N. NODULE, SMALL BOWEL MESENTERY, EXCISION:   - Fibroadipose tissue, positive for low grade serous carcinoma   - Serosa with inflammation, necrosis and mesothelial hyperplasia   O. NODULE, SMALL BOWEL MESENTERY, EXCISION #2:   - Serosa with inflammation, necrosis and mesothelial hyperplasia   P. LIVER, PARTIAL LEFT HEPATECTOMY LEVEL 3:   - Focal nodular hyperplasia, FNH; negative for malignancy     (with typical trichrome stain and map-like pattern on glutamine   synthetase immunohistochemistry, with   appropriate controls)      Plan: Carboplatin AUC 6 and paclitaxel 175 mg/m2 x 6 cycles.     5/10/21: Cycle 1 carboplatin and paclitaxel.   14.  6/2/21: Cycle 2 carboplatin and paclitaxel.   8.  6/23/21: Cycle 3 carboplatin and paclitaxel.   9.      Past Medical History:  Past Medical History:   Diagnosis Date     Endometriosis        Past Surgical History:  Past Surgical History:   Procedure Laterality Date     GYN SURGERY      c section      HC TOOTH EXTRACTION W/FORCEP       HEPATECTOMY PARTIAL  4/6/2021    Procedure: Partial left hepatectomy level 3;  Surgeon: Chris Zuñiga MD;  Location: [facility] OR     LAPAROSCOPIC ABLATION ENDOMETRIOSIS      2007     LAPAROSCOPY DIAGNOSTIC (GYN) N/A 4/6/2021    Procedure: Diagnostic laparoscopy with biopsies converted to exploratory laparotmy, modified radical hysterectomy bilateral salpingo-oophorectomy, bilateral ureterolysis, peritonectomy, omentectomy, resection of liver nodule, diapraghm stripping, liver mobilization, colon mobilization, resection of abdominal wall nodule, tumor debulking 2R0, no macroscopic residual disease;  Surgeon: Ben Ch         Health Maintenance Due   Topic Date Due     PREVENTIVE CARE VISIT  Never done     ADVANCE CARE PLANNING  Never done     Pneumococcal Vaccine: Pediatrics (0 to 5  Years) and At-Risk Patients (6 to 64 Years) (1 of 4 - PCV13) Never done     COVID-19 Vaccine (1) Never done     HIV SCREENING  Never done     HEPATITIS C SCREENING  Never done     DTAP/TDAP/TD IMMUNIZATION (1 - Tdap) Never done     PAP  03/01/2016       Current Medications:     Current Outpatient Medications   Medication Sig Dispense Refill     acetaminophen (TYLENOL) 325 MG tablet Take 2 tablets (650 mg) by mouth every 6 hours 60 tablet 0     senna-docusate (SENOKOT-S/PERICOLACE) 8.6-50 MG tablet Take 2 tablets by mouth 2 times daily as needed for constipation 60 tablet 0       Allergies:   Allergies   Allergen Reactions     Amoxicillin Hives     Full body hives after amoxacillin & Vicodin after wisdom teeth pulled when around 29 yo.     Vicodin [Hydrocodone-Acetaminophen] Rash      Social History:     Social History     Tobacco Use     Smoking status: Never Smoker     Smokeless tobacco: Never Used   Substance Use Topics     Alcohol use: No       History   Drug Use No       Family History:       Family History   Problem Relation Age of Onset     Blood Disease Mother         hemachromatosis      Diabetes Maternal Grandmother      Alzheimer Disease Maternal Grandmother      Multiple Sclerosis Maternal Grandmother      Pancreatic Cancer Paternal Grandmother      Leukemia Paternal Grandfather      Anesthesia Reaction No family hx of      Deep Vein Thrombosis (DVT) No family hx of        Physical Exam:     LMP 03/11/2021   There is no height or weight on file to calculate BMI.    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      A total of 40 minutes was spent with the patient, 30 minutes of which were spent in counseling the patient and/or treatment planning.     1. Stage IV low grade serous ovarian  cancer  2. Optimal cytoreduction to R0  3. PM Program     We did discuss I would recommend to proceed with 3 additional adjuvant cycles of carboplatin AUC of 6 and paclitaxel of 175mg/m2 q 21 days for a toal 6 cycles followed by letrozole maintenance therapy. Side effects and risks and benefits were discussed. She is does have had more difficulties with the chemotherapy and will consider.     All questions were answered.  We will also have her consented for the Precision Medicine Program and Avatar Program.      Ivana Sandoval MD, MS    Department of Obstetrics and Gynecology   Division of Gynecologic Oncology   [facility]  Phone: [phone]    CC  Patient Care Team:  [facility] as PCP - General (Clinic)  Karol Hairston MD as Assigned PCP  Ivana Sandoval MD as MD (Gynecologic Oncology)  Karol Hairston MD as Referring Physician (Family Medicine)  Bhavani Torrez, APRN CNP as Assigned Cancer Care Provider  IVANA SANDOVAL

## 2021-07-21 NOTE — PROGRESS NOTES
Kayy is a 40 year old who is being evaluated via a billable video visit.      How would you like to obtain your AVS? MyChart  If the video visit is dropped, the invitation should be resent by: Send to e-mail at: [email or web address]  Will anyone else be joining your video visit? No  .    BINH Oseguera    Video-Visit Details    40 minutes                Follow Up Notes on Referred Patient    Date: 2021       Dr. 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 She has been having a more difficult time to recover from her chemotherapy with subsequent cycles. Some nausea, no vomiting, fever or chills. Normal urinary and bowel function. She still does have some vaginal spotting. No B-symtoms.     Oncology History:  Initially, she was seen for vomiting and nausea, reduced oral intake, some urinary frequency with abdominal distention over a couple months.  She also lost about 8 pounds in the last couple of weeks.      3/17/2021:  191.  CT AP  IMPRESSION:   1.  Left adnexal soft tissue mass associated with large volume ascites  and foci of peritoneal thickening is suspicious for ovarian/peritoneal  malignancy. Recommend gyne-oncology consultation.  2.  4.1 cm posterior uterine mass is likely a fibroid. Enhancing  nodule on the surface of the uterine fundus could be a subserosal  fibroid or peritoneal deposit.  3.  Focal ill-defined hypoenhancement in the liver, near the falciform  ligament, could be due to focal fatty infiltration versus peritoneal  deposit. Enhancing lesion in segment 8 is favored to be a hemangioma  but needs confirmation with MRI.  4.  Subcentimeter pelvic lymph nodes are indeterminate.  [Access Center: Left adnexal soft tissue mass associated with large  volume ascites and foci of peritoneal thickening is suspicious  for  ovarian/peritoneal malignancy. Recommend gyne-oncology consultation.     3/19/21: CT chest  IMPRESSION:   1. No pulmonary nodules or lymphadenopathy.  2. Area of bony sclerosis in the sternum, concern for metastatic  Disease.     3/19/21: MRI abdomen  IMPRESSION: In this patient with concern for left ovarian neoplastic  lesion, the current MRI scan shows:  1. Multiple hepatic lesions, including:-  a. Indeterminate segment 6 subcapsular lesion, noting lack of  retention on the hepatobiliary phase and T2 hyperintensity.  Possibility of a sclerosing hemangioma should be considered along with  possibility of small metastatic lesion (considered less likely). If  this subcapsular lesion is not evaluated in the operating room as part  of staging procedure, recommend three-month follow-up MRI exam using  Eovist as intravenous contrast.  b. Hypodensity along the falciform ligament seen on recent CT exam has  been characterized by this MRI exam as focal fatty infiltration.  c. Multiple benign lesions, including at least 3 focal nodular  hyperplasias and hemangioma.  2. Moderate to large volume ascites with mild omental thickening,  presumably malignant in the setting of ovarian malignancy. This is  better characterized on CT abdomen and pelvis exam.  3. Layering sludge in the gallbladder.     4/1/21: CEA <0.5. CA 19-9 9.     4/6/21:  Diagnostic laparoscopy with biopsies, converted to exploratory laparotomy, modified radical hysterectomy, bilateral salpingo-oophorectomy, bilateral ureterolysis, bladder peritonectomy, omentectomy, diaphragm stripping, liver mobilization, colon mobilization, small bowel serosa oversewing, resection of abdominal wall nodule  Partial hepatectomy performed by Surgical Oncology (Dr. Zuñiga)   Optimal debulking to no residual macroscopic residual disease R0  FINAL DIAGNOSIS:   A. OVARY, RIGHT, BIOPSY:   - Serous borderline tumor   B. NODULE, CUL DE SAC, EXCISION:   - Positive for serous  carcinoma, low grade   C. OMENTUM, BIOPSY:   - Positive for serous carcinoma   D. OVARY AND FALLOPIAN TUBE, LEFT, SALPINGO-OOPHORECTOMY:   - Serous carcinoma of the ovary, low grade on a background of a borderline    tumor   - Multiple follicular cysts   - Fallopian tube with serosal involvement by low grade serous carcinoma   E. UTERUS, CERVIX, RIGHT FALLOPIAN TUBE AND OVARY, HYSTERECTOMY WITH RIGHT    SALPINGO-OOPHORECTOMY:   - Secretory-type endometrium   - Leiomyomas and one adenomyoma   - Uterine serosal involvement by low grade serous carcinoma,   endometriosis/endosalpingiosis and fibrous   adhesions   - Ovary with surface serous borderline tumor and fibrous adhesions   - Fallopian tube serosal inflammation and adhesions   F. NODULE, CUL DE SAC, EXCISION #2:   - Serosal hyperplasia and calcification   - Fibrous adhesions   - Negative for malignancy   G. NODULE, LEFT PELVIC SIDEWALL, EXCISION:   - Fibroadipose tissue, positive for low grade serous carcinoma   H. FALCIFORM LIGAMENT, BIOPSY:   - Fibroadipose tissue with endosalpingiosis   - Negative for malignancy   I. OMENTUM, OMENTECTOMY:   - Omental adipose tissue, positive for low grade serous carcinoma   (non-invasive implants)   - Omental lymph node positive for metastatic low grade serous carcinoma,   and endosalpingiosis   J. NODULE, RIGHT LOWER QUADRANT ABDOMINAL WALL, EXCISION:   - Fibroadipose tissue, positive for low grade serous carcinoma   K. RIGHT EVERETT-DIAPHRAGM PERITONEUM, EXCISION:   - Fibroadipose tissue, positive for low grade serous carcinoma   - Endosalpingiosis, calcifications and fibrous adhesions   L. NODULE, LIVER SURFACE, EXCISION:   - Hepatic parenchyma and capsular endosalpingiosis   - Negative for malignancy   M. NODULE, SMALL BOWEL SEROSAL, EXCISION:   - Serosa with inflammation, necrosis and mesothelial hyperplasia   - Negative for malignancy   N. NODULE, SMALL BOWEL MESENTERY, EXCISION:   - Fibroadipose tissue, positive for low  grade serous carcinoma   - Serosa with inflammation, necrosis and mesothelial hyperplasia   O. NODULE, SMALL BOWEL MESENTERY, EXCISION #2:   - Serosa with inflammation, necrosis and mesothelial hyperplasia   P. LIVER, PARTIAL LEFT HEPATECTOMY LEVEL 3:   - Focal nodular hyperplasia, FNH; negative for malignancy     (with typical trichrome stain and map-like pattern on glutamine   synthetase immunohistochemistry, with   appropriate controls)      Plan: Carboplatin AUC 6 and paclitaxel 175 mg/m2 x 6 cycles.     5/10/21: Cycle 1 carboplatin and paclitaxel.   14.  6/2/21: Cycle 2 carboplatin and paclitaxel.   8.  6/23/21: Cycle 3 carboplatin and paclitaxel.   9.        Past Medical History:    Past Medical History:   Diagnosis Date     Endometriosis          Past Surgical History:    Past Surgical History:   Procedure Laterality Date     GYN SURGERY      c section      HC TOOTH EXTRACTION W/FORCEP       HEPATECTOMY PARTIAL  4/6/2021    Procedure: Partial left hepatectomy level 3;  Surgeon: Chris Zuñiga MD;  Location: [facility] OR     LAPAROSCOPIC ABLATION ENDOMETRIOSIS      2007     LAPAROSCOPY DIAGNOSTIC (GYN) N/A 4/6/2021    Procedure: Diagnostic laparoscopy with biopsies converted to exploratory laparotmy, modified radical hysterectomy bilateral salpingo-oophorectomy, bilateral ureterolysis, peritonectomy, omentectomy, resection of liver nodule, diapraghm stripping, liver mobilization, colon mobilization, resection of abdominal wall nodule, tumor debulking 2R0, no macroscopic residual disease;  Surgeon: Ben Ch         Health Maintenance Due   Topic Date Due     PREVENTIVE CARE VISIT  Never done     ADVANCE CARE PLANNING  Never done     Pneumococcal Vaccine: Pediatrics (0 to 5 Years) and At-Risk Patients (6 to 64 Years) (1 of 4 - PCV13) Never done     COVID-19 Vaccine (1) Never done     HIV SCREENING  Never done     HEPATITIS C SCREENING  Never done     DTAP/TDAP/TD IMMUNIZATION (1 - Tdap)  Never done     PAP  03/01/2016       Current Medications:     Current Outpatient Medications   Medication Sig Dispense Refill     acetaminophen (TYLENOL) 325 MG tablet Take 2 tablets (650 mg) by mouth every 6 hours 60 tablet 0     senna-docusate (SENOKOT-S/PERICOLACE) 8.6-50 MG tablet Take 2 tablets by mouth 2 times daily as needed for constipation 60 tablet 0         Allergies:        Allergies   Allergen Reactions     Amoxicillin Hives     Full body hives after amoxacillin & Vicodin after wisdom teeth pulled when around 27 yo.     Vicodin [Hydrocodone-Acetaminophen] Rash        Social History:     Social History     Tobacco Use     Smoking status: Never Smoker     Smokeless tobacco: Never Used   Substance Use Topics     Alcohol use: No       History   Drug Use No         Family History:       Family History   Problem Relation Age of Onset     Blood Disease Mother         hemachromatosis      Diabetes Maternal Grandmother      Alzheimer Disease Maternal Grandmother      Multiple Sclerosis Maternal Grandmother      Pancreatic Cancer Paternal Grandmother      Leukemia Paternal Grandfather      Anesthesia Reaction No family hx of      Deep Vein Thrombosis (DVT) No family hx of          Physical Exam:     LMP 03/11/2021   There is no height or weight on file to calculate BMI.    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      A total of 40 minutes was spent with the patient, 30 minutes of which were spent in counseling the patient and/or treatment planning.        1. Stage IV low grade serous ovarian cancer  2. Optimal cytoreduction to R0  3. PM Program     We did discuss I would recommend to proceed with 3 additional adjuvant cycles of carboplatin AUC of 6 and paclitaxel of 175mg/m2 q 21 days for a  toal 6 cycles followed by letrozole maintenance therapy. Side effects and risks and benefits were discussed. She is does have had more difficulties with the chemotherapy and will consider.     All questions were answered.  We will also have her consented for the Precision Medicine Program and Avatar Program.      Ivana Sandoval MD, MS    Department of Obstetrics and Gynecology   Division of Gynecologic Oncology   [facility]  Phone: [phone]    CC  Patient Care Team:  [facility] as PCP - General (Clinic)  Karol Hairston MD as Assigned PCP  Ivana Sandoval MD as MD (Gynecologic Oncology)  Karol Hairston MD as Referring Physician (Family Medicine)  Bhavani Torrez, APRN CNP as Assigned Cancer Care Provider  IVANA SANDOVAL

## 2021-08-25 ENCOUNTER — VIRTUAL VISIT (OUTPATIENT)
Dept: ONCOLOGY | Facility: CLINIC | Age: 41
End: 2021-08-25
Attending: OBSTETRICS & GYNECOLOGY
Payer: COMMERCIAL

## 2021-08-25 DIAGNOSIS — C56.9 OVARIAN CANCER, UNSPECIFIED LATERALITY (H): Primary | ICD-10-CM

## 2021-08-25 PROCEDURE — 99214 OFFICE O/P EST MOD 30 MIN: CPT | Mod: 95 | Performed by: OBSTETRICS & GYNECOLOGY

## 2021-08-25 NOTE — PROGRESS NOTES
Kayy is a 40 year old who is being evaluated via a billable video visit.      How would you like to obtain your AVS? MyChart  If the video visit is dropped, the invitation should be resent by: Send to e-mail at: [email or web address]  Will anyone else be joining your video visit? No       Phone visit 30 minutes.                            Consult Notes on Referred Patient    Date: 2021       Dr. 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 She has been having a more difficult time to recover from her chemotherapy with subsequent cycles. Some nausea, no vomiting, fever or chills. Normal urinary and bowel function. She still does have some vaginal spotting. No B-symtoms. She has been feeling better now since she has not done any additional cycles of chenotherapy. She did notice some hip and back pain with running.     Oncology History:  Initially, she was seen for vomiting and nausea, reduced oral intake, some urinary frequency with abdominal distention over a couple months.  She also lost about 8 pounds in the last couple of weeks.      3/17/2021:  191.  CT AP  IMPRESSION:   1.  Left adnexal soft tissue mass associated with large volume ascites  and foci of peritoneal thickening is suspicious for ovarian/peritoneal  malignancy. Recommend gyne-oncology consultation.  2.  4.1 cm posterior uterine mass is likely a fibroid. Enhancing  nodule on the surface of the uterine fundus could be a subserosal  fibroid or peritoneal deposit.  3.  Focal ill-defined hypoenhancement in the liver, near the falciform  ligament, could be due to focal fatty infiltration versus peritoneal  deposit. Enhancing lesion in segment 8 is favored to be a hemangioma  but needs confirmation with MRI.  4.  Subcentimeter pelvic lymph nodes are indeterminate.  [Access Center:  Left adnexal soft tissue mass associated with large  volume ascites and foci of peritoneal thickening is suspicious for  ovarian/peritoneal malignancy. Recommend gyne-oncology consultation.     3/19/21: CT chest  IMPRESSION:   1. No pulmonary nodules or lymphadenopathy.  2. Area of bony sclerosis in the sternum, concern for metastatic  Disease.     3/19/21: MRI abdomen  IMPRESSION: In this patient with concern for left ovarian neoplastic  lesion, the current MRI scan shows:  1. Multiple hepatic lesions, including:-  a. Indeterminate segment 6 subcapsular lesion, noting lack of  retention on the hepatobiliary phase and T2 hyperintensity.  Possibility of a sclerosing hemangioma should be considered along with  possibility of small metastatic lesion (considered less likely). If  this subcapsular lesion is not evaluated in the operating room as part  of staging procedure, recommend three-month follow-up MRI exam using  Eovist as intravenous contrast.  b. Hypodensity along the falciform ligament seen on recent CT exam has  been characterized by this MRI exam as focal fatty infiltration.  c. Multiple benign lesions, including at least 3 focal nodular  hyperplasias and hemangioma.  2. Moderate to large volume ascites with mild omental thickening,  presumably malignant in the setting of ovarian malignancy. This is  better characterized on CT abdomen and pelvis exam.  3. Layering sludge in the gallbladder.     4/1/21: CEA <0.5. CA 19-9 9.     4/6/21:  Diagnostic laparoscopy with biopsies, converted to exploratory laparotomy, modified radical hysterectomy, bilateral salpingo-oophorectomy, bilateral ureterolysis, bladder peritonectomy, omentectomy, diaphragm stripping, liver mobilization, colon mobilization, small bowel serosa oversewing, resection of abdominal wall nodule  Partial hepatectomy performed by Surgical Oncology (Dr. Zuñiga)   Optimal debulking to no residual macroscopic residual disease R0  FINAL DIAGNOSIS:   A.  OVARY, RIGHT, BIOPSY:   - Serous borderline tumor   B. NODULE, CUL DE SAC, EXCISION:   - Positive for serous carcinoma, low grade   C. OMENTUM, BIOPSY:   - Positive for serous carcinoma   D. OVARY AND FALLOPIAN TUBE, LEFT, SALPINGO-OOPHORECTOMY:   - Serous carcinoma of the ovary, low grade on a background of a borderline    tumor   - Multiple follicular cysts   - Fallopian tube with serosal involvement by low grade serous carcinoma   E. UTERUS, CERVIX, RIGHT FALLOPIAN TUBE AND OVARY, HYSTERECTOMY WITH RIGHT    SALPINGO-OOPHORECTOMY:   - Secretory-type endometrium   - Leiomyomas and one adenomyoma   - Uterine serosal involvement by low grade serous carcinoma,   endometriosis/endosalpingiosis and fibrous   adhesions   - Ovary with surface serous borderline tumor and fibrous adhesions   - Fallopian tube serosal inflammation and adhesions   F. NODULE, CUL DE SAC, EXCISION #2:   - Serosal hyperplasia and calcification   - Fibrous adhesions   - Negative for malignancy   G. NODULE, LEFT PELVIC SIDEWALL, EXCISION:   - Fibroadipose tissue, positive for low grade serous carcinoma   H. FALCIFORM LIGAMENT, BIOPSY:   - Fibroadipose tissue with endosalpingiosis   - Negative for malignancy   I. OMENTUM, OMENTECTOMY:   - Omental adipose tissue, positive for low grade serous carcinoma   (non-invasive implants)   - Omental lymph node positive for metastatic low grade serous carcinoma,   and endosalpingiosis   J. NODULE, RIGHT LOWER QUADRANT ABDOMINAL WALL, EXCISION:   - Fibroadipose tissue, positive for low grade serous carcinoma   K. RIGHT EVERETT-DIAPHRAGM PERITONEUM, EXCISION:   - Fibroadipose tissue, positive for low grade serous carcinoma   - Endosalpingiosis, calcifications and fibrous adhesions   L. NODULE, LIVER SURFACE, EXCISION:   - Hepatic parenchyma and capsular endosalpingiosis   - Negative for malignancy   M. NODULE, SMALL BOWEL SEROSAL, EXCISION:   - Serosa with inflammation, necrosis and mesothelial hyperplasia   -  Negative for malignancy   N. NODULE, SMALL BOWEL MESENTERY, EXCISION:   - Fibroadipose tissue, positive for low grade serous carcinoma   - Serosa with inflammation, necrosis and mesothelial hyperplasia   O. NODULE, SMALL BOWEL MESENTERY, EXCISION #2:   - Serosa with inflammation, necrosis and mesothelial hyperplasia   P. LIVER, PARTIAL LEFT HEPATECTOMY LEVEL 3:   - Focal nodular hyperplasia, FNH; negative for malignancy     (with typical trichrome stain and map-like pattern on glutamine   synthetase immunohistochemistry, with   appropriate controls)      Plan: Carboplatin AUC 6 and paclitaxel 175 mg/m2 x 6 cycles.     5/10/21: Cycle 1 carboplatin and paclitaxel.   14.  6/2/21: Cycle 2 carboplatin and paclitaxel.   8.  6/23/21: Cycle 3 carboplatin and paclitaxel.   9.    Past Medical History:    Past Medical History:   Diagnosis Date     Endometriosis          Past Surgical History:    Past Surgical History:   Procedure Laterality Date     GYN SURGERY      c section      HC TOOTH EXTRACTION W/FORCEP       HEPATECTOMY PARTIAL  4/6/2021    Procedure: Partial left hepatectomy level 3;  Surgeon: Chris Zuñiga MD;  Location: [facility] OR     LAPAROSCOPIC ABLATION ENDOMETRIOSIS      2007     LAPAROSCOPY DIAGNOSTIC (GYN) N/A 4/6/2021    Procedure: Diagnostic laparoscopy with biopsies converted to exploratory laparotmy, modified radical hysterectomy bilateral salpingo-oophorectomy, bilateral ureterolysis, peritonectomy, omentectomy, resection of liver nodule, diapraghm stripping, liver mobilization, colon mobilization, resection of abdominal wall nodule, tumor debulking 2R0, no macroscopic residual disease;  Surgeon: Ben Ch         Health Maintenance:  Health Maintenance Due   Topic Date Due     PREVENTIVE CARE VISIT  Never done     ADVANCE CARE PLANNING  Never done     Pneumococcal Vaccine: Pediatrics (0 to 5 Years) and At-Risk Patients (6 to 64 Years) (1 of 4 - PCV13) Never done     COVID-19 Vaccine  (1) Never done     HIV SCREENING  Never done     HEPATITIS C SCREENING  Never done     DTAP/TDAP/TD IMMUNIZATION (1 - Tdap) Never done     PAP  03/01/2016     INFLUENZA VACCINE (1) 09/01/2021           Current Medications:     has a current medication list which includes the following prescription(s): acetaminophen and senna-docusate.       Allergies:     Amoxicillin and Vicodin [hydrocodone-acetaminophen]        Social History:     Social History     Tobacco Use     Smoking status: Never Smoker     Smokeless tobacco: Never Used   Substance Use Topics     Alcohol use: No       History   Drug Use No           Family History:       Family History   Problem Relation Age of Onset     Blood Disease Mother         hemachromatosis      Diabetes Maternal Grandmother      Alzheimer Disease Maternal Grandmother      Multiple Sclerosis Maternal Grandmother      Pancreatic Cancer Paternal Grandmother      Leukemia Paternal Grandfather      Anesthesia Reaction No family hx of      Deep Vein Thrombosis (DVT) No family hx of          Physical Exam:     LMP 03/11/2021   There is no height or weight on file to calculate BMI.       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      A total of 30 minutes was spent with the patient, 20 minutes of which were spent in counseling the patient and/or treatment planning.        1. Stage IV low grade serous ovarian cancer  2. Optimal cytoreduction to R0  3. PM Program     We did discuss I would recommend to proceed with aromatase inhibitor letrozole 2.5mg once a day as maintenance given we have only done 3 cycles of chemotherapy as adjuvant treatment. Given her difficulties with chemotherapy and joint pain she will consider. We will see her back every 3 month for surveillance  visits with a  for the first 2 years followed by every 6 month for the next 3 years followed by yearly.      All questions were answered.       Ivana Sandoval MD, MS    Department of Obstetrics and Gynecology   Division of Gynecologic Oncology   [facility]  Phone: [phone] cc  Patient Care Team:  [facility] as PCP - General (Clinic)  Karol Hairston MD as Assigned PCP  Ivana Sandoval MD as MD (Gynecologic Oncology)  Karol Hairston MD as Referring Physician (Family Medicine)  Bhavani Torrez APRN CNP as Assigned Cancer Care Provider  IVANA SANDOVAL

## 2021-08-25 NOTE — LETTER
2021         RE: Kayy Salazar  [address] [facility] [address]        Dear Colleague,    Thank you for referring your patient, Kayy Salazar, to the [facility]. Please see a copy of my visit note below.    Kayy is a 40 year old who is being evaluated via a billable video visit.      How would you like to obtain your AVS? MyChart  If the video visit is dropped, the invitation should be resent by: Send to e-mail at: [email or web address]  Will anyone else be joining your video visit? No       Phone visit 30 minutes.                            Consult Notes on Referred Patient    Date: 2021       Dr. 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 She has been having a more difficult time to recover from her chemotherapy with subsequent cycles. Some nausea, no vomiting, fever or chills. Normal urinary and bowel function. She still does have some vaginal spotting. No B-symtoms. She has been feeling better now since she has not done any additional cycles of chenotherapy. She did notice some hip and back pain with running.     Oncology History:  Initially, she was seen for vomiting and nausea, reduced oral intake, some urinary frequency with abdominal distention over a couple months.  She also lost about 8 pounds in the last couple of weeks.      3/17/2021:  191.  CT AP  IMPRESSION:   1.  Left adnexal soft tissue mass associated with large volume ascites  and foci of peritoneal thickening is suspicious for ovarian/peritoneal  malignancy. Recommend gyne-oncology consultation.  2.  4.1 cm posterior uterine mass is likely a fibroid. Enhancing  nodule on the surface of the uterine fundus could be a subserosal  fibroid or peritoneal deposit.  3.  Focal ill-defined hypoenhancement in the liver, near the  falciform  ligament, could be due to focal fatty infiltration versus peritoneal  deposit. Enhancing lesion in segment 8 is favored to be a hemangioma  but needs confirmation with MRI.  4.  Subcentimeter pelvic lymph nodes are indeterminate.  [Access Center: Left adnexal soft tissue mass associated with large  volume ascites and foci of peritoneal thickening is suspicious for  ovarian/peritoneal malignancy. Recommend gyne-oncology consultation.     3/19/21: CT chest  IMPRESSION:   1. No pulmonary nodules or lymphadenopathy.  2. Area of bony sclerosis in the sternum, concern for metastatic  Disease.     3/19/21: MRI abdomen  IMPRESSION: In this patient with concern for left ovarian neoplastic  lesion, the current MRI scan shows:  1. Multiple hepatic lesions, including:-  a. Indeterminate segment 6 subcapsular lesion, noting lack of  retention on the hepatobiliary phase and T2 hyperintensity.  Possibility of a sclerosing hemangioma should be considered along with  possibility of small metastatic lesion (considered less likely). If  this subcapsular lesion is not evaluated in the operating room as part  of staging procedure, recommend three-month follow-up MRI exam using  Eovist as intravenous contrast.  b. Hypodensity along the falciform ligament seen on recent CT exam has  been characterized by this MRI exam as focal fatty infiltration.  c. Multiple benign lesions, including at least 3 focal nodular  hyperplasias and hemangioma.  2. Moderate to large volume ascites with mild omental thickening,  presumably malignant in the setting of ovarian malignancy. This is  better characterized on CT abdomen and pelvis exam.  3. Layering sludge in the gallbladder.     4/1/21: CEA <0.5. CA 19-9 9.     4/6/21:  Diagnostic laparoscopy with biopsies, converted to exploratory laparotomy, modified radical hysterectomy, bilateral salpingo-oophorectomy, bilateral ureterolysis, bladder peritonectomy, omentectomy, diaphragm stripping,  liver mobilization, colon mobilization, small bowel serosa oversewing, resection of abdominal wall nodule  Partial hepatectomy performed by Surgical Oncology (Dr. Zuñiga)   Optimal debulking to no residual macroscopic residual disease R0  FINAL DIAGNOSIS:   A. OVARY, RIGHT, BIOPSY:   - Serous borderline tumor   B. NODULE, CUL DE SAC, EXCISION:   - Positive for serous carcinoma, low grade   C. OMENTUM, BIOPSY:   - Positive for serous carcinoma   D. OVARY AND FALLOPIAN TUBE, LEFT, SALPINGO-OOPHORECTOMY:   - Serous carcinoma of the ovary, low grade on a background of a borderline    tumor   - Multiple follicular cysts   - Fallopian tube with serosal involvement by low grade serous carcinoma   E. UTERUS, CERVIX, RIGHT FALLOPIAN TUBE AND OVARY, HYSTERECTOMY WITH RIGHT    SALPINGO-OOPHORECTOMY:   - Secretory-type endometrium   - Leiomyomas and one adenomyoma   - Uterine serosal involvement by low grade serous carcinoma,   endometriosis/endosalpingiosis and fibrous   adhesions   - Ovary with surface serous borderline tumor and fibrous adhesions   - Fallopian tube serosal inflammation and adhesions   F. NODULE, CUL DE SAC, EXCISION #2:   - Serosal hyperplasia and calcification   - Fibrous adhesions   - Negative for malignancy   G. NODULE, LEFT PELVIC SIDEWALL, EXCISION:   - Fibroadipose tissue, positive for low grade serous carcinoma   H. FALCIFORM LIGAMENT, BIOPSY:   - Fibroadipose tissue with endosalpingiosis   - Negative for malignancy   I. OMENTUM, OMENTECTOMY:   - Omental adipose tissue, positive for low grade serous carcinoma   (non-invasive implants)   - Omental lymph node positive for metastatic low grade serous carcinoma,   and endosalpingiosis   J. NODULE, RIGHT LOWER QUADRANT ABDOMINAL WALL, EXCISION:   - Fibroadipose tissue, positive for low grade serous carcinoma   K. RIGHT EVERETT-DIAPHRAGM PERITONEUM, EXCISION:   - Fibroadipose tissue, positive for low grade serous carcinoma   - Endosalpingiosis, calcifications  and fibrous adhesions   L. NODULE, LIVER SURFACE, EXCISION:   - Hepatic parenchyma and capsular endosalpingiosis   - Negative for malignancy   M. NODULE, SMALL BOWEL SEROSAL, EXCISION:   - Serosa with inflammation, necrosis and mesothelial hyperplasia   - Negative for malignancy   N. NODULE, SMALL BOWEL MESENTERY, EXCISION:   - Fibroadipose tissue, positive for low grade serous carcinoma   - Serosa with inflammation, necrosis and mesothelial hyperplasia   O. NODULE, SMALL BOWEL MESENTERY, EXCISION #2:   - Serosa with inflammation, necrosis and mesothelial hyperplasia   P. LIVER, PARTIAL LEFT HEPATECTOMY LEVEL 3:   - Focal nodular hyperplasia, FNH; negative for malignancy     (with typical trichrome stain and map-like pattern on glutamine   synthetase immunohistochemistry, with   appropriate controls)      Plan: Carboplatin AUC 6 and paclitaxel 175 mg/m2 x 6 cycles.     5/10/21: Cycle 1 carboplatin and paclitaxel.   14.  6/2/21: Cycle 2 carboplatin and paclitaxel.   8.  6/23/21: Cycle 3 carboplatin and paclitaxel.   9.    Past Medical History:    Past Medical History:   Diagnosis Date     Endometriosis          Past Surgical History:    Past Surgical History:   Procedure Laterality Date     GYN SURGERY      c section      HC TOOTH EXTRACTION W/FORCEP       HEPATECTOMY PARTIAL  4/6/2021    Procedure: Partial left hepatectomy level 3;  Surgeon: Chris Zuñiga MD;  Location: [facility] OR     LAPAROSCOPIC ABLATION ENDOMETRIOSIS      2007     LAPAROSCOPY DIAGNOSTIC (GYN) N/A 4/6/2021    Procedure: Diagnostic laparoscopy with biopsies converted to exploratory laparotmy, modified radical hysterectomy bilateral salpingo-oophorectomy, bilateral ureterolysis, peritonectomy, omentectomy, resection of liver nodule, diapraghm stripping, liver mobilization, colon mobilization, resection of abdominal wall nodule, tumor debulking 2R0, no macroscopic residual disease;  Surgeon: Jing          Patrick  Maintenance:  Health Maintenance Due   Topic Date Due     PREVENTIVE CARE VISIT  Never done     ADVANCE CARE PLANNING  Never done     Pneumococcal Vaccine: Pediatrics (0 to 5 Years) and At-Risk Patients (6 to 64 Years) (1 of 4 - PCV13) Never done     COVID-19 Vaccine (1) Never done     HIV SCREENING  Never done     HEPATITIS C SCREENING  Never done     DTAP/TDAP/TD IMMUNIZATION (1 - Tdap) Never done     PAP  03/01/2016     INFLUENZA VACCINE (1) 09/01/2021           Current Medications:     has a current medication list which includes the following prescription(s): acetaminophen and senna-docusate.       Allergies:     Amoxicillin and Vicodin [hydrocodone-acetaminophen]        Social History:     Social History     Tobacco Use     Smoking status: Never Smoker     Smokeless tobacco: Never Used   Substance Use Topics     Alcohol use: No       History   Drug Use No           Family History:       Family History   Problem Relation Age of Onset     Blood Disease Mother         hemachromatosis      Diabetes Maternal Grandmother      Alzheimer Disease Maternal Grandmother      Multiple Sclerosis Maternal Grandmother      Pancreatic Cancer Paternal Grandmother      Leukemia Paternal Grandfather      Anesthesia Reaction No family hx of      Deep Vein Thrombosis (DVT) No family hx of          Physical Exam:     LMP 03/11/2021   There is no height or weight on file to calculate BMI.       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      A total of 30 minutes was spent with the patient, 20 minutes of which were spent in counseling the patient and/or treatment planning.        1. Stage IV low grade serous ovarian cancer  2. Optimal cytoreduction to R0  3. PM Program     We did discuss I would recommend to proceed  with aromatase inhibitor letrozole 2.5mg once a day as maintenance given we have only done 3 cycles of chemotherapy as adjuvant treatment. Given her difficulties with chemotherapy and joint pain she will consider. We will see her back every 3 month for surveillance visits with a  for the first 2 years followed by every 6 month for the next 3 years followed by yearly.      All questions were answered.       Luis Ch MD, MS    Department of Obstetrics and Gynecology   Division of Gynecologic Oncology   [facility]  Phone: [phone]      CC  Patient Care Team:  [facility] as PCP - General (Clinic)  Karol Hairston MD as Assigned PCP  Bhavani Torrez APRN CNP as Assigned Cancer Care Provider

## 2021-10-03 ENCOUNTER — HEALTH MAINTENANCE LETTER (OUTPATIENT)
Age: 41
End: 2021-10-03

## 2021-11-02 ENCOUNTER — PATIENT OUTREACH (OUTPATIENT)
Dept: ONCOLOGY | Facility: CLINIC | Age: 41
End: 2021-11-02

## 2021-12-01 ENCOUNTER — ONCOLOGY VISIT (OUTPATIENT)
Dept: ONCOLOGY | Facility: CLINIC | Age: 41
End: 2021-12-01
Attending: OBSTETRICS & GYNECOLOGY
Payer: COMMERCIAL

## 2021-12-01 ENCOUNTER — LAB (OUTPATIENT)
Dept: LAB | Facility: CLINIC | Age: 41
End: 2021-12-01
Attending: OBSTETRICS & GYNECOLOGY
Payer: COMMERCIAL

## 2021-12-01 VITALS
DIASTOLIC BLOOD PRESSURE: 77 MMHG | RESPIRATION RATE: 16 BRPM | HEART RATE: 53 BPM | BODY MASS INDEX: 19.37 KG/M2 | SYSTOLIC BLOOD PRESSURE: 118 MMHG | OXYGEN SATURATION: 100 % | WEIGHT: 105.9 LBS | TEMPERATURE: 98.4 F

## 2021-12-01 DIAGNOSIS — C56.9 OVARIAN CANCER, UNSPECIFIED LATERALITY (H): Primary | ICD-10-CM

## 2021-12-01 DIAGNOSIS — Z00.6 RESEARCH STUDY PATIENT: Primary | ICD-10-CM

## 2021-12-01 LAB
HOLD SPECIMEN: NORMAL

## 2021-12-01 PROCEDURE — G0463 HOSPITAL OUTPT CLINIC VISIT: HCPCS

## 2021-12-01 PROCEDURE — 36415 COLL VENOUS BLD VENIPUNCTURE: CPT

## 2021-12-01 PROCEDURE — 99215 OFFICE O/P EST HI 40 MIN: CPT | Performed by: OBSTETRICS & GYNECOLOGY

## 2021-12-01 ASSESSMENT — PAIN SCALES - GENERAL: PAINLEVEL: NO PAIN (0)

## 2021-12-01 NOTE — Clinical Note
12/1/2021         RE: Kayy Salazar  [id number] [facility] [address]        Dear Colleague,    Thank you for referring your patient, Kayy Salazar, to the [facility]. Please see a copy of my visit note below.    No notes on file    Again, thank you for allowing me to participate in the care of your patient.        Sincerely,        Luis Ch MD

## 2021-12-01 NOTE — LETTER
12/1/2021      RE: Kayy Salazar  [address] [facility] [address]       No notes on file    Luis Ch MD

## 2021-12-01 NOTE — NURSING NOTE
"Oncology Rooming Note    December 1, 2021 3:20 PM   Kayy Salazar is a 41 year old female who presents for:    Chief Complaint   Patient presents with     Blood Draw     Labs drawn by christiano in lab by RN. VS taken.      Oncology Clinic Visit     Ovarian Cancer      Initial Vitals: /77 (BP Location: Right arm, Patient Position: Sitting, Cuff Size: Adult Regular)   Pulse 53   Temp 98.4  F (36.9  C) (Oral)   Resp 16   Wt 48 kg (105 lb 14.4 oz)   LMP 03/11/2021   SpO2 100%   BMI 19.37 kg/m   Estimated body mass index is 19.37 kg/m  as calculated from the following:    Height as of 7/19/21: 1.575 m (5' 2\").    Weight as of this encounter: 48 kg (105 lb 14.4 oz). Body surface area is 1.45 meters squared.  No Pain (0) Comment: Data Unavailable   Patient's last menstrual period was 03/11/2021.  Allergies reviewed: Yes  Medications reviewed: Yes    Medications: Medication refills not needed today.  Pharmacy name entered into [facility]:    [id number] - [place], [place] - [address] [address] [place] [address] [facility] - [place], [place] - [address], [id number]    Clinical concerns: None       Mi Parish LPN            "

## 2021-12-01 NOTE — LETTER
2021       RE: Kayy Salazar  [address] [facility] [address]     Dear Colleague,    Thank you for referring your patient, Kayy Salazar, to the [facility] at [facility]. Please see a copy of my visit note below.                Follow Up Notes on Referred Patient    Date: 2021       Dr. 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 She has been having a more difficult time to recover from her chemotherapy with subsequent cycles. Some nausea, no vomiting, fever or chills. Normal urinary and bowel function. She does not have any vaginal spotting. No B-symtoms. She has been feeling better now since she has not done any additional cycles of chenotherapy. She did notice some hip and back pain with running.     Oncology History:  Initially, she was seen for vomiting and nausea, reduced oral intake, some urinary frequency with abdominal distention over a couple months.  She also lost about 8 pounds in the last couple of weeks.      3/17/2021:  191.  CT AP  IMPRESSION:   1.  Left adnexal soft tissue mass associated with large volume ascites  and foci of peritoneal thickening is suspicious for ovarian/peritoneal  malignancy. Recommend gyne-oncology consultation.  2.  4.1 cm posterior uterine mass is likely a fibroid. Enhancing  nodule on the surface of the uterine fundus could be a subserosal  fibroid or peritoneal deposit.  3.  Focal ill-defined hypoenhancement in the liver, near the falciform  ligament, could be due to focal fatty infiltration versus peritoneal  deposit. Enhancing lesion in segment 8 is favored to be a hemangioma  but needs confirmation with MRI.  4.  Subcentimeter pelvic lymph nodes are indeterminate.  [Access Center: Left adnexal soft tissue mass associated with large  volume  ascites and foci of peritoneal thickening is suspicious for  ovarian/peritoneal malignancy. Recommend gyne-oncology consultation.     3/19/21: CT chest  IMPRESSION:   1. No pulmonary nodules or lymphadenopathy.  2. Area of bony sclerosis in the sternum, concern for metastatic  Disease.     3/19/21: MRI abdomen  IMPRESSION: In this patient with concern for left ovarian neoplastic  lesion, the current MRI scan shows:  1. Multiple hepatic lesions, including:-  a. Indeterminate segment 6 subcapsular lesion, noting lack of  retention on the hepatobiliary phase and T2 hyperintensity.  Possibility of a sclerosing hemangioma should be considered along with  possibility of small metastatic lesion (considered less likely). If  this subcapsular lesion is not evaluated in the operating room as part  of staging procedure, recommend three-month follow-up MRI exam using  Eovist as intravenous contrast.  b. Hypodensity along the falciform ligament seen on recent CT exam has  been characterized by this MRI exam as focal fatty infiltration.  c. Multiple benign lesions, including at least 3 focal nodular  hyperplasias and hemangioma.  2. Moderate to large volume ascites with mild omental thickening,  presumably malignant in the setting of ovarian malignancy. This is  better characterized on CT abdomen and pelvis exam.  3. Layering sludge in the gallbladder.     4/1/21: CEA <0.5. CA 19-9 9.     4/6/21:  Diagnostic laparoscopy with biopsies, converted to exploratory laparotomy, modified radical hysterectomy, bilateral salpingo-oophorectomy, bilateral ureterolysis, bladder peritonectomy, omentectomy, diaphragm stripping, liver mobilization, colon mobilization, small bowel serosa oversewing, resection of abdominal wall nodule  Partial hepatectomy performed by Surgical Oncology (Dr. Zuñiga)   Optimal debulking to no residual macroscopic residual disease R0  FINAL DIAGNOSIS:   A. OVARY, RIGHT, BIOPSY:   - Serous borderline tumor   B.  NODULE, CUL DE SAC, EXCISION:   - Positive for serous carcinoma, low grade   C. OMENTUM, BIOPSY:   - Positive for serous carcinoma   D. OVARY AND FALLOPIAN TUBE, LEFT, SALPINGO-OOPHORECTOMY:   - Serous carcinoma of the ovary, low grade on a background of a borderline    tumor   - Multiple follicular cysts   - Fallopian tube with serosal involvement by low grade serous carcinoma   E. UTERUS, CERVIX, RIGHT FALLOPIAN TUBE AND OVARY, HYSTERECTOMY WITH RIGHT    SALPINGO-OOPHORECTOMY:   - Secretory-type endometrium   - Leiomyomas and one adenomyoma   - Uterine serosal involvement by low grade serous carcinoma,   endometriosis/endosalpingiosis and fibrous   adhesions   - Ovary with surface serous borderline tumor and fibrous adhesions   - Fallopian tube serosal inflammation and adhesions   F. NODULE, CUL DE SAC, EXCISION #2:   - Serosal hyperplasia and calcification   - Fibrous adhesions   - Negative for malignancy   G. NODULE, LEFT PELVIC SIDEWALL, EXCISION:   - Fibroadipose tissue, positive for low grade serous carcinoma   H. FALCIFORM LIGAMENT, BIOPSY:   - Fibroadipose tissue with endosalpingiosis   - Negative for malignancy   I. OMENTUM, OMENTECTOMY:   - Omental adipose tissue, positive for low grade serous carcinoma   (non-invasive implants)   - Omental lymph node positive for metastatic low grade serous carcinoma,   and endosalpingiosis   J. NODULE, RIGHT LOWER QUADRANT ABDOMINAL WALL, EXCISION:   - Fibroadipose tissue, positive for low grade serous carcinoma   K. RIGHT EVERETT-DIAPHRAGM PERITONEUM, EXCISION:   - Fibroadipose tissue, positive for low grade serous carcinoma   - Endosalpingiosis, calcifications and fibrous adhesions   L. NODULE, LIVER SURFACE, EXCISION:   - Hepatic parenchyma and capsular endosalpingiosis   - Negative for malignancy   M. NODULE, SMALL BOWEL SEROSAL, EXCISION:   - Serosa with inflammation, necrosis and mesothelial hyperplasia   - Negative for malignancy   N. NODULE, SMALL BOWEL MESENTERY,  EXCISION:   - Fibroadipose tissue, positive for low grade serous carcinoma   - Serosa with inflammation, necrosis and mesothelial hyperplasia   O. NODULE, SMALL BOWEL MESENTERY, EXCISION #2:   - Serosa with inflammation, necrosis and mesothelial hyperplasia   P. LIVER, PARTIAL LEFT HEPATECTOMY LEVEL 3:   - Focal nodular hyperplasia, FNH; negative for malignancy     (with typical trichrome stain and map-like pattern on glutamine   synthetase immunohistochemistry, with   appropriate controls)      Plan: Carboplatin AUC 6 and paclitaxel 175 mg/m2 x 6 cycles.     5/10/21: Cycle 1 carboplatin and paclitaxel.   14.  6/2/21: Cycle 2 carboplatin and paclitaxel.   8.  6/23/21: Cycle 3 carboplatin and paclitaxel.   9.      Past Medical History:    Past Medical History:   Diagnosis Date     Endometriosis          Past Surgical History:    Past Surgical History:   Procedure Laterality Date     GYN SURGERY      c section      HC TOOTH EXTRACTION W/FORCEP       HEPATECTOMY PARTIAL  4/6/2021    Procedure: Partial left hepatectomy level 3;  Surgeon: Chris Zuñiga MD;  Location: [facility] OR     LAPAROSCOPIC ABLATION ENDOMETRIOSIS      2007     LAPAROSCOPY DIAGNOSTIC (GYN) N/A 4/6/2021    Procedure: Diagnostic laparoscopy with biopsies converted to exploratory laparotmy, modified radical hysterectomy bilateral salpingo-oophorectomy, bilateral ureterolysis, peritonectomy, omentectomy, resection of liver nodule, diapraghm stripping, liver mobilization, colon mobilization, resection of abdominal wall nodule, tumor debulking 2R0, no macroscopic residual disease;  Surgeon: Ben Ch         Health Maintenance Due   Topic Date Due     PREVENTIVE CARE VISIT  Never done     ADVANCE CARE PLANNING  Never done     Pneumococcal Vaccine: Pediatrics (0 to 5 Years) and At-Risk Patients (6 to 64 Years) (1 of 4 - PCV13) Never done     COVID-19 Vaccine (1) Never done     HIV SCREENING  Never done     HEPATITIS C SCREENING  Never  done     DTAP/TDAP/TD IMMUNIZATION (1 - Tdap) Never done     PAP  03/01/2016     INFLUENZA VACCINE (1) Never done     PHQ-2  01/01/2022       Current Medications:     Current Outpatient Medications   Medication Sig Dispense Refill     acetaminophen (TYLENOL) 325 MG tablet Take 2 tablets (650 mg) by mouth every 6 hours 60 tablet 0     senna-docusate (SENOKOT-S/PERICOLACE) 8.6-50 MG tablet Take 2 tablets by mouth 2 times daily as needed for constipation 60 tablet 0         Allergies:        Allergies   Allergen Reactions     Amoxicillin Hives     Full body hives after amoxacillin & Vicodin after wisdom teeth pulled when around 29 yo.     Vicodin [Hydrocodone-Acetaminophen] Rash        Social History:     Social History     Tobacco Use     Smoking status: Never Smoker     Smokeless tobacco: Never Used   Substance Use Topics     Alcohol use: No       History   Drug Use No         Family History:       Family History   Problem Relation Age of Onset     Blood Disease Mother         hemachromatosis      Diabetes Maternal Grandmother      Alzheimer Disease Maternal Grandmother      Multiple Sclerosis Maternal Grandmother      Pancreatic Cancer Paternal Grandmother      Leukemia Paternal Grandfather      Anesthesia Reaction No family hx of      Deep Vein Thrombosis (DVT) No family hx of          Physical Exam:     /77 (BP Location: Right arm, Patient Position: Sitting, Cuff Size: Adult Regular)   Pulse 53   Temp 98.4  F (36.9  C) (Oral)   Resp 16   Wt 48 kg (105 lb 14.4 oz)   LMP 03/11/2021   SpO2 100%   BMI 19.37 kg/m    Body mass index is 19.37 kg/m .    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      A total of 40 minutes was spent with the patient, 30 minutes of which were spent in  counseling the patient and/or treatment planning.        1. Stage IV low grade serous ovarian cancer  2. Optimal cytoreduction to R0  3. PM Program  4. S/p 3 cycles of chemotherapy     We did discuss again I would recommend to proceed with aromatase inhibitor letrozole 2.5mg once a day as maintenance given we have only done 3 cycles of chemotherapy as adjuvant treatment. Given her difficulties with chemotherapy and joint pain she will consider. We will see her back every 3 month for surveillance visits with a  for the first 2 years followed by every 6 month for the next 3 years followed by yearly.      All questions were answered.       Luis Ch MD, MS    Department of Obstetrics and Gynecology   Division of Gynecologic Oncology   [facility]  Phone: [phone]    CC  Patient Care Team:  No Ref-Primary, Physician as PCP - Karol Thompson MD as Assigned PCP  Jany Kimbrough GC as Assigned OBGYN Provider

## 2021-12-01 NOTE — LETTER
12/1/2021      RE: Kayy Salazar  [id number] [facility] [address]       No notes on file    Luis hC MD

## 2021-12-01 NOTE — LETTER
12/1/2021       RE: Kayy Salazar  [address] [facility] [address]     Dear Colleague,    Thank you for referring your patient, Kayy Salazar, to the [facility] at [facility]. Please see a copy of my visit note below.    No notes on file    Again, thank you for allowing me to participate in the care of your patient.      Sincerely,    Luis Ch MD

## 2021-12-01 NOTE — NURSING NOTE
Chief Complaint   Patient presents with     Blood Draw     Labs drawn by  in lab by RN. VS taken.      Labs collected from venipuncture by RN. No labs ordered; JIC tubes sent to 1st floor lab. Vitals taken. Checked in for appointment(s).  Julito Dang RN     Speech-Language Pathology Bedside Dysphagia Treatment Session    Patient Name: Phillip Marks    [id number] Date: 8/15/2021     Subjective: Sleeping upon arrival  Easily aroused  Followed 1 step commands functionally  Pleasant and cooperative  Objective:  Puree(>15 trials): Functional formulation and transfer  Initial swallow timely  Noted additional intermittent 1-2 swallows per bolus  SpO2 stable  Vocal quality clear  Mech soft: Slightly prolonged mastication  Attributed to edentulous state  With additional time was functional  Trace stasis on lingum post initial swallow  Cleared with liquid wash vs additional trial  Did not accumulate  Continual double swallow avg per bolus  SpO2 levels stable at 99  HTL via spoon/cup: Adequate oral containment and transfer  Initial swallow suspected to be timely per palpation  Consistent double swallow per bolus  NTL via spoon/cup: Same tolerance present as NT  Oral stage adequate  Initial swallow per laryngeal palpation suspected to be timely  With slight wait time, additional 1-2 swallows per bolus present, suspected to be secondary to pharyngeal stasis  No significant overt s/s: SpO2 levels stable, vocal quality clear, no increased SOB  Assessment:  Pt seen for bedside dysphagia tx session to f/u from evaluation completed on 8/13/21  Recommended puree and HTLs  Baseline diet is adv mechanical and NT  Nsg staff reporting tolerance of current restrictive diet  Only deficit observed was poor oral strength and coordination for extraction of thickened liquid from straw  This is anticipated due to thickened consistency  Focus of treatment was trialing of upgraded textures  Refer to objective for breakdown on tolerance  Due to same, recommending diet advancement to level 2 mech soft and NTLs via cup  Aspiration Precautions are to continue  Pt independently utilized primary dysphagia strategy of slow rate and single sips   Educated nsg on same and updated visual aid @ head of bed for across staff education  Did require feed A at times due to instability and weakness of UE  Plan: Continue ST services for dysphagia  F/u treatment to monitor tolerance of upgraded diet  Complete advanced solid trials as baseline diet is adv [facility]/NT      [address] Dago, CCC-SLP

## 2021-12-03 ENCOUNTER — LAB (OUTPATIENT)
Dept: LAB | Facility: CLINIC | Age: 41
End: 2021-12-03
Payer: COMMERCIAL

## 2021-12-03 DIAGNOSIS — C56.9 OVARIAN CANCER, UNSPECIFIED LATERALITY (H): ICD-10-CM

## 2021-12-03 PROCEDURE — 86304 IMMUNOASSAY TUMOR CA 125: CPT

## 2021-12-03 PROCEDURE — 36415 COLL VENOUS BLD VENIPUNCTURE: CPT

## 2021-12-04 LAB — CANCER AG125 SERPL-ACNC: 9 U/ML (ref 0–30)

## 2022-01-03 NOTE — PROGRESS NOTES
Follow Up Notes on Referred Patient    Date: 2021       Dr. 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 She has been having a more difficult time to recover from her chemotherapy with subsequent cycles. Some nausea, no vomiting, fever or chills. Normal urinary and bowel function. She does not have any vaginal spotting. No B-symtoms. She has been feeling better now since she has not done any additional cycles of chenotherapy. She did notice some hip and back pain with running.     Oncology History:  Initially, she was seen for vomiting and nausea, reduced oral intake, some urinary frequency with abdominal distention over a couple months.  She also lost about 8 pounds in the last couple of weeks.      3/17/2021:  191.  CT AP  IMPRESSION:   1.  Left adnexal soft tissue mass associated with large volume ascites  and foci of peritoneal thickening is suspicious for ovarian/peritoneal  malignancy. Recommend gyne-oncology consultation.  2.  4.1 cm posterior uterine mass is likely a fibroid. Enhancing  nodule on the surface of the uterine fundus could be a subserosal  fibroid or peritoneal deposit.  3.  Focal ill-defined hypoenhancement in the liver, near the falciform  ligament, could be due to focal fatty infiltration versus peritoneal  deposit. Enhancing lesion in segment 8 is favored to be a hemangioma  but needs confirmation with MRI.  4.  Subcentimeter pelvic lymph nodes are indeterminate.  [Access Center: Left adnexal soft tissue mass associated with large  volume ascites and foci of peritoneal thickening is suspicious for  ovarian/peritoneal malignancy. Recommend gyne-oncology consultation.     3/19/21: CT chest  IMPRESSION:   1. No pulmonary nodules or lymphadenopathy.  2. Area of bony sclerosis in the sternum, concern for metastatic  Disease.     3/19/21: MRI  abdomen  IMPRESSION: In this patient with concern for left ovarian neoplastic  lesion, the current MRI scan shows:  1. Multiple hepatic lesions, including:-  a. Indeterminate segment 6 subcapsular lesion, noting lack of  retention on the hepatobiliary phase and T2 hyperintensity.  Possibility of a sclerosing hemangioma should be considered along with  possibility of small metastatic lesion (considered less likely). If  this subcapsular lesion is not evaluated in the operating room as part  of staging procedure, recommend three-month follow-up MRI exam using  Eovist as intravenous contrast.  b. Hypodensity along the falciform ligament seen on recent CT exam has  been characterized by this MRI exam as focal fatty infiltration.  c. Multiple benign lesions, including at least 3 focal nodular  hyperplasias and hemangioma.  2. Moderate to large volume ascites with mild omental thickening,  presumably malignant in the setting of ovarian malignancy. This is  better characterized on CT abdomen and pelvis exam.  3. Layering sludge in the gallbladder.     4/1/21: CEA <0.5. CA 19-9 9.     4/6/21:  Diagnostic laparoscopy with biopsies, converted to exploratory laparotomy, modified radical hysterectomy, bilateral salpingo-oophorectomy, bilateral ureterolysis, bladder peritonectomy, omentectomy, diaphragm stripping, liver mobilization, colon mobilization, small bowel serosa oversewing, resection of abdominal wall nodule  Partial hepatectomy performed by Surgical Oncology (Dr. Zuñiga)   Optimal debulking to no residual macroscopic residual disease R0  FINAL DIAGNOSIS:   A. OVARY, RIGHT, BIOPSY:   - Serous borderline tumor   B. NODULE, CUL DE SAC, EXCISION:   - Positive for serous carcinoma, low grade   C. OMENTUM, BIOPSY:   - Positive for serous carcinoma   D. OVARY AND FALLOPIAN TUBE, LEFT, SALPINGO-OOPHORECTOMY:   - Serous carcinoma of the ovary, low grade on a background of a borderline    tumor   - Multiple follicular cysts   -  Fallopian tube with serosal involvement by low grade serous carcinoma   E. UTERUS, CERVIX, RIGHT FALLOPIAN TUBE AND OVARY, HYSTERECTOMY WITH RIGHT    SALPINGO-OOPHORECTOMY:   - Secretory-type endometrium   - Leiomyomas and one adenomyoma   - Uterine serosal involvement by low grade serous carcinoma,   endometriosis/endosalpingiosis and fibrous   adhesions   - Ovary with surface serous borderline tumor and fibrous adhesions   - Fallopian tube serosal inflammation and adhesions   F. NODULE, CUL DE SAC, EXCISION #2:   - Serosal hyperplasia and calcification   - Fibrous adhesions   - Negative for malignancy   G. NODULE, LEFT PELVIC SIDEWALL, EXCISION:   - Fibroadipose tissue, positive for low grade serous carcinoma   H. FALCIFORM LIGAMENT, BIOPSY:   - Fibroadipose tissue with endosalpingiosis   - Negative for malignancy   I. OMENTUM, OMENTECTOMY:   - Omental adipose tissue, positive for low grade serous carcinoma   (non-invasive implants)   - Omental lymph node positive for metastatic low grade serous carcinoma,   and endosalpingiosis   J. NODULE, RIGHT LOWER QUADRANT ABDOMINAL WALL, EXCISION:   - Fibroadipose tissue, positive for low grade serous carcinoma   K. RIGHT EVERETT-DIAPHRAGM PERITONEUM, EXCISION:   - Fibroadipose tissue, positive for low grade serous carcinoma   - Endosalpingiosis, calcifications and fibrous adhesions   L. NODULE, LIVER SURFACE, EXCISION:   - Hepatic parenchyma and capsular endosalpingiosis   - Negative for malignancy   M. NODULE, SMALL BOWEL SEROSAL, EXCISION:   - Serosa with inflammation, necrosis and mesothelial hyperplasia   - Negative for malignancy   N. NODULE, SMALL BOWEL MESENTERY, EXCISION:   - Fibroadipose tissue, positive for low grade serous carcinoma   - Serosa with inflammation, necrosis and mesothelial hyperplasia   O. NODULE, SMALL BOWEL MESENTERY, EXCISION #2:   - Serosa with inflammation, necrosis and mesothelial hyperplasia   P. LIVER, PARTIAL LEFT HEPATECTOMY LEVEL 3:   -  Focal nodular hyperplasia, FNH; negative for malignancy     (with typical trichrome stain and map-like pattern on glutamine   synthetase immunohistochemistry, with   appropriate controls)      Plan: Carboplatin AUC 6 and paclitaxel 175 mg/m2 x 6 cycles.     5/10/21: Cycle 1 carboplatin and paclitaxel.   14.  6/2/21: Cycle 2 carboplatin and paclitaxel.   8.  6/23/21: Cycle 3 carboplatin and paclitaxel.   9.      Past Medical History:    Past Medical History:   Diagnosis Date     Endometriosis          Past Surgical History:    Past Surgical History:   Procedure Laterality Date     GYN SURGERY      c section      HC TOOTH EXTRACTION W/FORCEP       HEPATECTOMY PARTIAL  4/6/2021    Procedure: Partial left hepatectomy level 3;  Surgeon: Chris Zuñiga MD;  Location: [facility] OR     LAPAROSCOPIC ABLATION ENDOMETRIOSIS      2007     LAPAROSCOPY DIAGNOSTIC (GYN) N/A 4/6/2021    Procedure: Diagnostic laparoscopy with biopsies converted to exploratory laparotmy, modified radical hysterectomy bilateral salpingo-oophorectomy, bilateral ureterolysis, peritonectomy, omentectomy, resection of liver nodule, diapraghm stripping, liver mobilization, colon mobilization, resection of abdominal wall nodule, tumor debulking 2R0, no macroscopic residual disease;  Surgeon: Ben Ch         Health Maintenance Due   Topic Date Due     PREVENTIVE CARE VISIT  Never done     ADVANCE CARE PLANNING  Never done     Pneumococcal Vaccine: Pediatrics (0 to 5 Years) and At-Risk Patients (6 to 64 Years) (1 of 4 - PCV13) Never done     COVID-19 Vaccine (1) Never done     HIV SCREENING  Never done     HEPATITIS C SCREENING  Never done     DTAP/TDAP/TD IMMUNIZATION (1 - Tdap) Never done     PAP  03/01/2016     INFLUENZA VACCINE (1) Never done     PHQ-2  01/01/2022       Current Medications:     Current Outpatient Medications   Medication Sig Dispense Refill     acetaminophen (TYLENOL) 325 MG tablet Take 2 tablets (650 mg) by mouth  every 6 hours 60 tablet 0     senna-docusate (SENOKOT-S/PERICOLACE) 8.6-50 MG tablet Take 2 tablets by mouth 2 times daily as needed for constipation 60 tablet 0         Allergies:        Allergies   Allergen Reactions     Amoxicillin Hives     Full body hives after amoxacillin & Vicodin after wisdom teeth pulled when around 29 yo.     Vicodin [Hydrocodone-Acetaminophen] Rash        Social History:     Social History     Tobacco Use     Smoking status: Never Smoker     Smokeless tobacco: Never Used   Substance Use Topics     Alcohol use: No       History   Drug Use No         Family History:       Family History   Problem Relation Age of Onset     Blood Disease Mother         hemachromatosis      Diabetes Maternal Grandmother      Alzheimer Disease Maternal Grandmother      Multiple Sclerosis Maternal Grandmother      Pancreatic Cancer Paternal Grandmother      Leukemia Paternal Grandfather      Anesthesia Reaction No family hx of      Deep Vein Thrombosis (DVT) No family hx of          Physical Exam:     /77 (BP Location: Right arm, Patient Position: Sitting, Cuff Size: Adult Regular)   Pulse 53   Temp 98.4  F (36.9  C) (Oral)   Resp 16   Wt 48 kg (105 lb 14.4 oz)   LMP 03/11/2021   SpO2 100%   BMI 19.37 kg/m    Body mass index is 19.37 kg/m .    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      A total of 40 minutes was spent with the patient, 30 minutes of which were spent in counseling the patient and/or treatment planning.        1. Stage IV low grade serous ovarian cancer  2. Optimal cytoreduction to R0  3. PM Program  4. S/p 3 cycles of chemotherapy     We did discuss again I would recommend to proceed with aromatase inhibitor letrozole 2.5mg once a day as maintenance given we have  only done 3 cycles of chemotherapy as adjuvant treatment. Given her difficulties with chemotherapy and joint pain she will consider. We will see her back every 3 month for surveillance visits with a  for the first 2 years followed by every 6 month for the next 3 years followed by yearly.      All questions were answered.       Luis Ch MD, MS    Department of Obstetrics and Gynecology   Division of Gynecologic Oncology   [facility]  Phone: [phone]    CC  Patient Care Team:  No Ref-Primary, Physician as PCP - General  Karol Hairston MD as Assigned PCP  Luis Ch MD as MD (Gynecologic Oncology)  Karol Hairston MD as Referring Physician (Family Medicine)  Luis Ch MD as Assigned Cancer Care Provider  Jany Kimbrough GC as Assigned OBGYN Provider  SELF, REFERRED

## 2022-03-15 NOTE — PROGRESS NOTES
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  She completed treatment with 3 cycles of paclitaxel and carboplatin per her request on 21.  She presents for a surveillance visit.      Oncology History:    Initially, she was seen for vomiting and nausea, reduced oral intake, some urinary frequency with abdominal distention over a couple months.  She also lost about 8 pounds in the last couple of weeks.      3/17/2021:  191.  CT AP  IMPRESSION:   1.  Left adnexal soft tissue mass associated with large volume ascites  and foci of peritoneal thickening is suspicious for ovarian/peritoneal  malignancy. Recommend gyne-oncology consultation.  2.  4.1 cm posterior uterine mass is likely a fibroid. Enhancing  nodule on the surface of the uterine fundus could be a subserosal  fibroid or peritoneal deposit.  3.  Focal ill-defined hypoenhancement in the liver, near the falciform  ligament, could be due to focal fatty infiltration versus peritoneal  deposit. Enhancing lesion in segment 8 is favored to be a hemangioma  but needs confirmation with MRI.  4.  Subcentimeter pelvic lymph nodes are indeterminate.  [Access Center: Left adnexal soft tissue mass associated with large  volume ascites and foci of peritoneal thickening is suspicious for  ovarian/peritoneal malignancy. Recommend gyne-oncology consultation.     3/19/21: CT chest  IMPRESSION:   1. No pulmonary nodules or lymphadenopathy.  2. Area of bony sclerosis in the sternum, concern for metastatic  Disease.     3/19/21: MRI abdomen  IMPRESSION: In this patient with concern for left ovarian neoplastic  lesion, the current MRI scan shows:  1. Multiple hepatic lesions, including:-  a. Indeterminate segment 6 subcapsular lesion, noting lack  of  retention on the hepatobiliary phase and T2 hyperintensity.  Possibility of a sclerosing hemangioma should be considered along with  possibility of small metastatic lesion (considered less likely). If  this subcapsular lesion is not evaluated in the operating room as part  of staging procedure, recommend three-month follow-up MRI exam using  Eovist as intravenous contrast.  b. Hypodensity along the falciform ligament seen on recent CT exam has  been characterized by this MRI exam as focal fatty infiltration.  c. Multiple benign lesions, including at least 3 focal nodular  hyperplasias and hemangioma.  2. Moderate to large volume ascites with mild omental thickening,  presumably malignant in the setting of ovarian malignancy. This is  better characterized on CT abdomen and pelvis exam.  3. Layering sludge in the gallbladder.     4/1/21: CEA <0.5. CA 19-9 9.     4/6/21:  Diagnostic laparoscopy with biopsies, converted to exploratory laparotomy, modified radical hysterectomy, bilateral salpingo-oophorectomy, bilateral ureterolysis, bladder peritonectomy, omentectomy, diaphragm stripping, liver mobilization, colon mobilization, small bowel serosa oversewing, resection of abdominal wall nodule  Partial hepatectomy performed by Surgical Oncology (Dr. Zuñiga)   Optimal debulking to no residual macroscopic residual disease R0  FINAL DIAGNOSIS:   A. OVARY, RIGHT, BIOPSY:   - Serous borderline tumor   B. NODULE, CUL DE SAC, EXCISION:   - Positive for serous carcinoma, low grade   C. OMENTUM, BIOPSY:   - Positive for serous carcinoma   D. OVARY AND FALLOPIAN TUBE, LEFT, SALPINGO-OOPHORECTOMY:   - Serous carcinoma of the ovary, low grade on a background of a borderline    tumor   - Multiple follicular cysts   - Fallopian tube with serosal involvement by low grade serous carcinoma   E. UTERUS, CERVIX, RIGHT FALLOPIAN TUBE AND OVARY, HYSTERECTOMY WITH RIGHT    SALPINGO-OOPHORECTOMY:   - Secretory-type endometrium   -  Leiomyomas and one adenomyoma   - Uterine serosal involvement by low grade serous carcinoma,   endometriosis/endosalpingiosis and fibrous   adhesions   - Ovary with surface serous borderline tumor and fibrous adhesions   - Fallopian tube serosal inflammation and adhesions   F. NODULE, CUL DE SAC, EXCISION #2:   - Serosal hyperplasia and calcification   - Fibrous adhesions   - Negative for malignancy   G. NODULE, LEFT PELVIC SIDEWALL, EXCISION:   - Fibroadipose tissue, positive for low grade serous carcinoma   H. FALCIFORM LIGAMENT, BIOPSY:   - Fibroadipose tissue with endosalpingiosis   - Negative for malignancy   I. OMENTUM, OMENTECTOMY:   - Omental adipose tissue, positive for low grade serous carcinoma   (non-invasive implants)   - Omental lymph node positive for metastatic low grade serous carcinoma,   and endosalpingiosis   J. NODULE, RIGHT LOWER QUADRANT ABDOMINAL WALL, EXCISION:   - Fibroadipose tissue, positive for low grade serous carcinoma   K. RIGHT EVERETT-DIAPHRAGM PERITONEUM, EXCISION:   - Fibroadipose tissue, positive for low grade serous carcinoma   - Endosalpingiosis, calcifications and fibrous adhesions   L. NODULE, LIVER SURFACE, EXCISION:   - Hepatic parenchyma and capsular endosalpingiosis   - Negative for malignancy   M. NODULE, SMALL BOWEL SEROSAL, EXCISION:   - Serosa with inflammation, necrosis and mesothelial hyperplasia   - Negative for malignancy   N. NODULE, SMALL BOWEL MESENTERY, EXCISION:   - Fibroadipose tissue, positive for low grade serous carcinoma   - Serosa with inflammation, necrosis and mesothelial hyperplasia   O. NODULE, SMALL BOWEL MESENTERY, EXCISION #2:   - Serosa with inflammation, necrosis and mesothelial hyperplasia   P. LIVER, PARTIAL LEFT HEPATECTOMY LEVEL 3:   - Focal nodular hyperplasia, FNH; negative for malignancy     (with typical trichrome stain and map-like pattern on glutamine   synthetase immunohistochemistry, with   appropriate controls)      Plan: Carboplatin  AUC 6 and paclitaxel 175 mg/m2 x 6 cycles.     5/10/21: Cycle 1 carboplatin and paclitaxel.   14.  6/2/21: Cycle 2 carboplatin and paclitaxel.   8.  6/23/21: Cycle 3 carboplatin and paclitaxel.  Deferred for neutropenia given on 6/28.  Refused Neulasta.   9.    7/19/21:  9.    Recommendation for letrazole 2.5 mg daily for maintenance which she has declined.    12/3/21:  9.                    Today she comes to clinic feeling well overall and is without gynecologic concern.  She notes increased stiffness in her hands in the morning and thinks that they may be swollen.  She also noted hand-and-foot sensitivity to cold over the winter.  She is wondering if this is related to her prior chemotherapy or circulation.  She did not note any changes in colors in her fingers or feet.  She denies any changes in her bowel or bladder habits, no nausea/emesis, no lower extremity edema, and no difficulties eating or sleeping. She denies any abdominal discomfort/bloating, no fevers or chills, and no chest pain or shortness of breath.  She is due for her annual physical and mammogram.  She is not vaccinated against Covid.                    Review of Systems:    Systemic           no weight changes; no fever; no chills; no night sweats; no appetite changes  Skin           no rashes, or lesions  Eye           no irritation; no changes in vision  Tiffany-Laryngeal           no dysphagia; no hoarseness   Pulmonary    no cough; no shortness of breath  Cardiovascular    no chest pain; no palpitations  Gastrointestinal    no diarrhea; no constipation; no abdominal pain; no changes in bowel habits; no blood in stool  Genitourinary   no urinary frequency; no urinary urgency; no dysuria; no pain; no abnormal vaginal discharge; no abnormal vaginal bleeding  Breast   no breast discharge; no breast changes; no breast pain  Musculoskeletal    no myalgias; no arthralgias; no back pain  Psychiatric           no depressed  mood; no anxiety    Hematologic   no tender lymph nodes; no noticeable swellings or lumps   Endocrine    no hot flashes; no heat/cold intolerance         Neurological   no tremor; no numbness and tingling; no headaches; no difficulty sleeping      Past Medical History:    Past Medical History:   Diagnosis Date     Endometriosis          Past Surgical History:    Past Surgical History:   Procedure Laterality Date     GYN SURGERY      c section      HC TOOTH EXTRACTION W/FORCEP       HEPATECTOMY PARTIAL  4/6/2021    Procedure: Partial left hepatectomy level 3;  Surgeon: Chris Zuñiga MD;  Location: [facility] OR     LAPAROSCOPIC ABLATION ENDOMETRIOSIS      2007     LAPAROSCOPY DIAGNOSTIC (GYN) N/A 4/6/2021    Procedure: Diagnostic laparoscopy with biopsies converted to exploratory laparotmy, modified radical hysterectomy bilateral salpingo-oophorectomy, bilateral ureterolysis, peritonectomy, omentectomy, resection of liver nodule, diapraghm stripping, liver mobilization, colon mobilization, resection of abdominal wall nodule, tumor debulking 2R0, no macroscopic residual disease;  Surgeon: Ben Ch         Health Maintenance Due   Topic Date Due     PREVENTIVE CARE VISIT  Never done     ADVANCE CARE PLANNING  Never done     Pneumococcal Vaccine: Pediatrics (0 to 5 Years) and At-Risk Patients (6 to 64 Years) (1 of 4 - PCV13) Never done     COVID-19 Vaccine (1) Never done     HIV SCREENING  Never done     HEPATITIS C SCREENING  Never done     DTAP/TDAP/TD IMMUNIZATION (1 - Tdap) Never done     INFLUENZA VACCINE (1) Never done     PHQ-2 (once per calendar year)  01/01/2022       Current Medications:     Current Outpatient Medications   Medication Sig Dispense Refill     multiple vitamin  tablet TABS Take 1 tablet by mouth daily       acetaminophen (TYLENOL) 325 MG tablet Take 2 tablets (650 mg) by mouth every 6 hours (Patient not taking: Reported on 3/23/2022) 60 tablet 0     senna-docusate (SENOKOT-S/PERICOLACE)  "8.6-50 MG tablet Take 2 tablets by mouth 2 times daily as needed for constipation (Patient not taking: Reported on 3/23/2022) 60 tablet 0         Allergies:        Allergies   Allergen Reactions     Amoxicillin Hives     Full body hives after amoxacillin & Vicodin after wisdom teeth pulled when around 29 yo.     Vicodin [Hydrocodone-Acetaminophen] Rash        Social History:     Social History     Tobacco Use     Smoking status: Never Smoker     Smokeless tobacco: Never Used   Substance Use Topics     Alcohol use: No       History   Drug Use No         Family History:     The patient's family history is notable for:    Family History   Problem Relation Age of Onset     Blood Disease Mother         hemachromatosis      Diabetes Maternal Grandmother      Alzheimer Disease Maternal Grandmother      Multiple Sclerosis Maternal Grandmother      Pancreatic Cancer Paternal Grandmother      Leukemia Paternal Grandfather      Anesthesia Reaction No family hx of      Deep Vein Thrombosis (DVT) No family hx of          Physical Exam:     /78 (BP Location: Left arm)   Pulse 52   Resp 16   Ht 1.575 m (5' 2.01\")   Wt 50.1 kg (110 lb 6.4 oz)   LMP 03/11/2021   SpO2 100%   BMI 20.19 kg/m    Body mass index is 20.19 kg/m .    General Appearance: healthy and alert, no distress     HEENT: no palpable nodules or masses        Cardiovascular: regular rate and rhythm, no gallops, rubs or murmurs     Respiratory: lungs clear, no rales, rhonchi or wheezes    Musculoskeletal: extremities non tender and without edema    Skin: no lesions or rashes     Neurological: normal gait, no gross defects     Psychiatric: appropriate mood and affect                               Hematological: normal cervical, supraclavicular and inguinal lymph nodes     Gastrointestinal:       abdomen soft, non-tender, non-distended, no organomegaly or masses    Genitourinary: External genitalia and urethral meatus appears normal.  Vagina is smooth without " nodularity or masses.  Cervix is surgically absent.  Bimanual exam reveal no masses, nodularity or fullness.  Recto-vaginal exam confirms these findings.      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  She completed treatment with 3 cycles of paclitaxel and carboplatin per her request on 6/23/21.  She presents for a surveillance visit.         23 minutes spent on the date of the encounter doing chart review, history and exam, documentation, and further activities as noted above.      Plan:     1.) Ovarian cancer:  GEORGE on exam.  RTC in 3 months for next surveillance visit and CA-125 level.  Plan for surveillance visits and CA-125 levels every 3 months until she has is out from treatment for 2 years (6/2023), followed by every 6 months for an additional 3 years (6/2026), then annually.  Discussed that her hand stiffness and hand and foot sensitivity to cold may be related to neuropathy from chemotherapy however this did not develop until 4 months after her last cancer treatment making it less likely.  Reviewed signs and symptoms for when she should contact the clinic or seek additional care.  Patient to contact the clinic with any questions or concerns in the interim.        Genetic risk factors were assessed and she was negative for mutations in BRCA 1/2.      Labs and/or tests ordered include:  .     2.) Health maintenance:  Issues addressed today include following up with PCP for annual health maintenance and non-gynecologic issues.       Bhavani Torrez, DNP, APRN, FNP-C  Nurse Practitioner  Division of Gynecologic Oncology  Pager: [phone]     CC  Patient Care Team:  No Ref-Primary, Physician as PCP - General  Karol Hairston MD as Assigned PCP  Luis Ch MD as MD (Gynecologic Oncology)  Karol Hairston MD as Referring Physician (Family Medicine)  Luis Ch MD as Assigned Cancer Care Provider

## 2022-03-23 ENCOUNTER — ONCOLOGY VISIT (OUTPATIENT)
Dept: ONCOLOGY | Facility: CLINIC | Age: 42
End: 2022-03-23
Payer: COMMERCIAL

## 2022-03-23 ENCOUNTER — LAB (OUTPATIENT)
Dept: LAB | Facility: CLINIC | Age: 42
End: 2022-03-23
Payer: COMMERCIAL

## 2022-03-23 VITALS
RESPIRATION RATE: 16 BRPM | SYSTOLIC BLOOD PRESSURE: 124 MMHG | WEIGHT: 110.4 LBS | OXYGEN SATURATION: 100 % | HEART RATE: 52 BPM | DIASTOLIC BLOOD PRESSURE: 78 MMHG | HEIGHT: 62 IN | BODY MASS INDEX: 20.32 KG/M2

## 2022-03-23 DIAGNOSIS — C56.9 OVARIAN CANCER, UNSPECIFIED LATERALITY (H): Primary | ICD-10-CM

## 2022-03-23 DIAGNOSIS — Z85.43 ENCOUNTER FOR FOLLOW-UP SURVEILLANCE OF OVARIAN CANCER: ICD-10-CM

## 2022-03-23 DIAGNOSIS — Z08 ENCOUNTER FOR FOLLOW-UP SURVEILLANCE OF OVARIAN CANCER: ICD-10-CM

## 2022-03-23 DIAGNOSIS — C56.9 OVARIAN CANCER, UNSPECIFIED LATERALITY (H): ICD-10-CM

## 2022-03-23 LAB
CANCER AG125 SERPL-ACNC: 11 U/ML (ref 0–30)
HOLD SPECIMEN: NORMAL
HOLD SPECIMEN: NORMAL

## 2022-03-23 PROCEDURE — 99213 OFFICE O/P EST LOW 20 MIN: CPT | Performed by: NURSE PRACTITIONER

## 2022-03-23 PROCEDURE — 86304 IMMUNOASSAY TUMOR CA 125: CPT

## 2022-03-23 PROCEDURE — 36415 COLL VENOUS BLD VENIPUNCTURE: CPT

## 2022-03-23 RX ORDER — MULTIVITAMIN
1 TABLET ORAL DAILY
COMMUNITY

## 2022-03-23 ASSESSMENT — PAIN SCALES - GENERAL: PAINLEVEL: NO PAIN (0)

## 2022-03-23 NOTE — NURSING NOTE
"Oncology Rooming Note    March 23, 2022 11:16 AM   Kayy Salazar is a 41 year old female who presents for:    Chief Complaint   Patient presents with     Oncology Clinic Visit     Follow up     Initial Vitals: /78 (BP Location: Left arm)   Pulse 52   Resp 16   Ht 1.575 m (5' 2.01\")   Wt 50.1 kg (110 lb 6.4 oz)   LMP 03/11/2021   SpO2 100%   BMI 20.19 kg/m   Estimated body mass index is 20.19 kg/m  as calculated from the following:    Height as of this encounter: 1.575 m (5' 2.01\").    Weight as of this encounter: 50.1 kg (110 lb 6.4 oz). Body surface area is 1.48 meters squared.  No Pain (0) Comment: Data Unavailable   Patient's last menstrual period was 03/11/2021.  Allergies reviewed: Yes  Medications reviewed: Yes    Medications: Medication refills not needed today.  Pharmacy name entered into [facility]:    [id number] [place] [facility] - [place], [place] - [address] [address] [place] [address] [facility] - [place], [place] - [address] [address], [id number]    Clinical concerns: Weakness in hands x several months- usually occurring in the morning and improving throughout the day.        Brenna Aguilar LPN              "

## 2022-03-23 NOTE — LETTER
3/23/2022         RE: Kayy Salazar  [address] [facility] [address]        Dear Colleague,    Thank you for referring your patient, Kayy Salazar, to the [facility]. Please see a copy of my visit note below.    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  She completed treatment with 3 cycles of paclitaxel and carboplatin per her request on 21.  She presents for a surveillance visit.      Oncology History:    Initially, she was seen for vomiting and nausea, reduced oral intake, some urinary frequency with abdominal distention over a couple months.  She also lost about 8 pounds in the last couple of weeks.      3/17/2021:  191.  CT AP  IMPRESSION:   1.  Left adnexal soft tissue mass associated with large volume ascites  and foci of peritoneal thickening is suspicious for ovarian/peritoneal  malignancy. Recommend gyne-oncology consultation.  2.  4.1 cm posterior uterine mass is likely a fibroid. Enhancing  nodule on the surface of the uterine fundus could be a subserosal  fibroid or peritoneal deposit.  3.  Focal ill-defined hypoenhancement in the liver, near the falciform  ligament, could be due to focal fatty infiltration versus peritoneal  deposit. Enhancing lesion in segment 8 is favored to be a hemangioma  but needs confirmation with MRI.  4.  Subcentimeter pelvic lymph nodes are indeterminate.  [Access Center: Left adnexal soft tissue mass associated with large  volume ascites and foci of peritoneal thickening is suspicious for  ovarian/peritoneal malignancy. Recommend gyne-oncology consultation.     3/19/21: CT chest  IMPRESSION:   1. No pulmonary nodules or lymphadenopathy.  2. Area of bony sclerosis in the sternum, concern for  metastatic  Disease.     3/19/21: MRI abdomen  IMPRESSION: In this patient with concern for left ovarian neoplastic  lesion, the current MRI scan shows:  1. Multiple hepatic lesions, including:-  a. Indeterminate segment 6 subcapsular lesion, noting lack of  retention on the hepatobiliary phase and T2 hyperintensity.  Possibility of a sclerosing hemangioma should be considered along with  possibility of small metastatic lesion (considered less likely). If  this subcapsular lesion is not evaluated in the operating room as part  of staging procedure, recommend three-month follow-up MRI exam using  Eovist as intravenous contrast.  b. Hypodensity along the falciform ligament seen on recent CT exam has  been characterized by this MRI exam as focal fatty infiltration.  c. Multiple benign lesions, including at least 3 focal nodular  hyperplasias and hemangioma.  2. Moderate to large volume ascites with mild omental thickening,  presumably malignant in the setting of ovarian malignancy. This is  better characterized on CT abdomen and pelvis exam.  3. Layering sludge in the gallbladder.     4/1/21: CEA <0.5. CA 19-9 9.     4/6/21:  Diagnostic laparoscopy with biopsies, converted to exploratory laparotomy, modified radical hysterectomy, bilateral salpingo-oophorectomy, bilateral ureterolysis, bladder peritonectomy, omentectomy, diaphragm stripping, liver mobilization, colon mobilization, small bowel serosa oversewing, resection of abdominal wall nodule  Partial hepatectomy performed by Surgical Oncology (Dr. Zuñiga)   Optimal debulking to no residual macroscopic residual disease R0  FINAL DIAGNOSIS:   A. OVARY, RIGHT, BIOPSY:   - Serous borderline tumor   B. NODULE, CUL DE SAC, EXCISION:   - Positive for serous carcinoma, low grade   C. OMENTUM, BIOPSY:   - Positive for serous carcinoma   D. OVARY AND FALLOPIAN TUBE, LEFT, SALPINGO-OOPHORECTOMY:   - Serous carcinoma of the ovary, low grade on a background of a borderline     tumor   - Multiple follicular cysts   - Fallopian tube with serosal involvement by low grade serous carcinoma   E. UTERUS, CERVIX, RIGHT FALLOPIAN TUBE AND OVARY, HYSTERECTOMY WITH RIGHT    SALPINGO-OOPHORECTOMY:   - Secretory-type endometrium   - Leiomyomas and one adenomyoma   - Uterine serosal involvement by low grade serous carcinoma,   endometriosis/endosalpingiosis and fibrous   adhesions   - Ovary with surface serous borderline tumor and fibrous adhesions   - Fallopian tube serosal inflammation and adhesions   F. NODULE, CUL DE SAC, EXCISION #2:   - Serosal hyperplasia and calcification   - Fibrous adhesions   - Negative for malignancy   G. NODULE, LEFT PELVIC SIDEWALL, EXCISION:   - Fibroadipose tissue, positive for low grade serous carcinoma   H. FALCIFORM LIGAMENT, BIOPSY:   - Fibroadipose tissue with endosalpingiosis   - Negative for malignancy   I. OMENTUM, OMENTECTOMY:   - Omental adipose tissue, positive for low grade serous carcinoma   (non-invasive implants)   - Omental lymph node positive for metastatic low grade serous carcinoma,   and endosalpingiosis   J. NODULE, RIGHT LOWER QUADRANT ABDOMINAL WALL, EXCISION:   - Fibroadipose tissue, positive for low grade serous carcinoma   K. RIGHT EVERETT-DIAPHRAGM PERITONEUM, EXCISION:   - Fibroadipose tissue, positive for low grade serous carcinoma   - Endosalpingiosis, calcifications and fibrous adhesions   L. NODULE, LIVER SURFACE, EXCISION:   - Hepatic parenchyma and capsular endosalpingiosis   - Negative for malignancy   M. NODULE, SMALL BOWEL SEROSAL, EXCISION:   - Serosa with inflammation, necrosis and mesothelial hyperplasia   - Negative for malignancy   N. NODULE, SMALL BOWEL MESENTERY, EXCISION:   - Fibroadipose tissue, positive for low grade serous carcinoma   - Serosa with inflammation, necrosis and mesothelial hyperplasia   O. NODULE, SMALL BOWEL MESENTERY, EXCISION #2:   - Serosa with inflammation, necrosis and mesothelial hyperplasia   P.  LIVER, PARTIAL LEFT HEPATECTOMY LEVEL 3:   - Focal nodular hyperplasia, FNH; negative for malignancy     (with typical trichrome stain and map-like pattern on glutamine   synthetase immunohistochemistry, with   appropriate controls)      Plan: Carboplatin AUC 6 and paclitaxel 175 mg/m2 x 6 cycles.     5/10/21: Cycle 1 carboplatin and paclitaxel.   14.  6/2/21: Cycle 2 carboplatin and paclitaxel.   8.  6/23/21: Cycle 3 carboplatin and paclitaxel.  Deferred for neutropenia given on 6/28.  Refused Neulasta.   9.    7/19/21:  9.    Recommendation for letrazole 2.5 mg daily for maintenance which she has declined.    12/3/21:  9.                    Today she comes to clinic feeling well overall and is without gynecologic concern.  She notes increased stiffness in her hands in the morning and thinks that they may be swollen.  She also noted hand-and-foot sensitivity to cold over the winter.  She is wondering if this is related to her prior chemotherapy or circulation.  She did not note any changes in colors in her fingers or feet.  She denies any changes in her bowel or bladder habits, no nausea/emesis, no lower extremity edema, and no difficulties eating or sleeping. She denies any abdominal discomfort/bloating, no fevers or chills, and no chest pain or shortness of breath.  She is due for her annual physical and mammogram.  She is not vaccinated against Covid.                    Review of Systems:    Systemic           no weight changes; no fever; no chills; no night sweats; no appetite changes  Skin           no rashes, or lesions  Eye           no irritation; no changes in vision  Tiffany-Laryngeal           no dysphagia; no hoarseness   Pulmonary    no cough; no shortness of breath  Cardiovascular    no chest pain; no palpitations  Gastrointestinal    no diarrhea; no constipation; no abdominal pain; no changes in bowel habits; no blood in stool  Genitourinary   no urinary frequency; no  urinary urgency; no dysuria; no pain; no abnormal vaginal discharge; no abnormal vaginal bleeding  Breast   no breast discharge; no breast changes; no breast pain  Musculoskeletal    no myalgias; no arthralgias; no back pain  Psychiatric           no depressed mood; no anxiety    Hematologic   no tender lymph nodes; no noticeable swellings or lumps   Endocrine    no hot flashes; no heat/cold intolerance         Neurological   no tremor; no numbness and tingling; no headaches; no difficulty sleeping      Past Medical History:    Past Medical History:   Diagnosis Date     Endometriosis          Past Surgical History:    Past Surgical History:   Procedure Laterality Date     GYN SURGERY      c section      HC TOOTH EXTRACTION W/FORCEP       HEPATECTOMY PARTIAL  4/6/2021    Procedure: Partial left hepatectomy level 3;  Surgeon: Chris Zuñiga MD;  Location: [facility] OR     LAPAROSCOPIC ABLATION ENDOMETRIOSIS      2007     LAPAROSCOPY DIAGNOSTIC (GYN) N/A 4/6/2021    Procedure: Diagnostic laparoscopy with biopsies converted to exploratory laparotmy, modified radical hysterectomy bilateral salpingo-oophorectomy, bilateral ureterolysis, peritonectomy, omentectomy, resection of liver nodule, diapraghm stripping, liver mobilization, colon mobilization, resection of abdominal wall nodule, tumor debulking 2R0, no macroscopic residual disease;  Surgeon: Ben Ch         Health Maintenance Due   Topic Date Due     PREVENTIVE CARE VISIT  Never done     ADVANCE CARE PLANNING  Never done     Pneumococcal Vaccine: Pediatrics (0 to 5 Years) and At-Risk Patients (6 to 64 Years) (1 of 4 - PCV13) Never done     COVID-19 Vaccine (1) Never done     HIV SCREENING  Never done     HEPATITIS C SCREENING  Never done     DTAP/TDAP/TD IMMUNIZATION (1 - Tdap) Never done     INFLUENZA VACCINE (1) Never done     PHQ-2 (once per calendar year)  01/01/2022       Current Medications:     Current Outpatient Medications   Medication Sig Dispense  "Refill     multiple vitamin  tablet TABS Take 1 tablet by mouth daily       acetaminophen (TYLENOL) 325 MG tablet Take 2 tablets (650 mg) by mouth every 6 hours (Patient not taking: Reported on 3/23/2022) 60 tablet 0     senna-docusate (SENOKOT-S/PERICOLACE) 8.6-50 MG tablet Take 2 tablets by mouth 2 times daily as needed for constipation (Patient not taking: Reported on 3/23/2022) 60 tablet 0         Allergies:        Allergies   Allergen Reactions     Amoxicillin Hives     Full body hives after amoxacillin & Vicodin after wisdom teeth pulled when around 27 yo.     Vicodin [Hydrocodone-Acetaminophen] Rash        Social History:     Social History     Tobacco Use     Smoking status: Never Smoker     Smokeless tobacco: Never Used   Substance Use Topics     Alcohol use: No       History   Drug Use No         Family History:     The patient's family history is notable for:    Family History   Problem Relation Age of Onset     Blood Disease Mother         hemachromatosis      Diabetes Maternal Grandmother      Alzheimer Disease Maternal Grandmother      Multiple Sclerosis Maternal Grandmother      Pancreatic Cancer Paternal Grandmother      Leukemia Paternal Grandfather      Anesthesia Reaction No family hx of      Deep Vein Thrombosis (DVT) No family hx of          Physical Exam:     /78 (BP Location: Left arm)   Pulse 52   Resp 16   Ht 1.575 m (5' 2.01\")   Wt 50.1 kg (110 lb 6.4 oz)   LMP 03/11/2021   SpO2 100%   BMI 20.19 kg/m    Body mass index is 20.19 kg/m .    General Appearance: healthy and alert, no distress     HEENT: no palpable nodules or masses        Cardiovascular: regular rate and rhythm, no gallops, rubs or murmurs     Respiratory: lungs clear, no rales, rhonchi or wheezes    Musculoskeletal: extremities non tender and without edema    Skin: no lesions or rashes     Neurological: normal gait, no gross defects     Psychiatric: appropriate mood and affect                             "   Hematological: normal cervical, supraclavicular and inguinal lymph nodes     Gastrointestinal:       abdomen soft, non-tender, non-distended, no organomegaly or masses    Genitourinary: External genitalia and urethral meatus appears normal.  Vagina is smooth without nodularity or masses.  Cervix is surgically absent.  Bimanual exam reveal no masses, nodularity or fullness.  Recto-vaginal exam confirms these findings.      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  She completed treatment with 3 cycles of paclitaxel and carboplatin per her request on 6/23/21.  She presents for a surveillance visit.         23 minutes spent on the date of the encounter doing chart review, history and exam, documentation, and further activities as noted above.      Plan:     1.) Ovarian cancer:  GEORGE on exam.  RTC in 3 months for next surveillance visit and CA-125 level.  Plan for surveillance visits and CA-125 levels every 3 months until she has is out from treatment for 2 years (6/2023), followed by every 6 months for an additional 3 years (6/2026), then annually.  Discussed that her hand stiffness and hand and foot sensitivity to cold may be related to neuropathy from chemotherapy however this did not develop until 4 months after her last cancer treatment making it less likely.  Reviewed signs and symptoms for when she should contact the clinic or seek additional care.  Patient to contact the clinic with any questions or concerns in the interim.        Genetic risk factors were assessed and she was negative for mutations in BRCA 1/2.      Labs and/or tests ordered include:  .     2.) Health maintenance:  Issues addressed today include following up with PCP for annual health maintenance and non-gynecologic issues.       Bhavani Torrez, DNP, APRN, FNP-C  Nurse Practitioner  Division of Gynecologic Oncology  Pager: [phone]     CC  Patient Care  Team:  No Ref-Primary, Physician as PCP - General  Karol Hairston MD as Assigned PCP  Luis Ch MD as MD (Gynecologic Oncology)  Karol Hairston MD as Referring Physician (Family Medicine)  Luis Ch MD as Assigned Cancer Care Provider          Again, thank you for allowing me to participate in the care of your patient.        Sincerely,        PAOLO Wasserman CNP

## 2022-05-15 ENCOUNTER — HEALTH MAINTENANCE LETTER (OUTPATIENT)
Age: 42
End: 2022-05-15

## 2022-06-15 NOTE — PROGRESS NOTES
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  She completed treatment with 3 cycles of paclitaxel and carboplatin per her request on 21.  She presents for a surveillance visit.       Oncology History:     Initially, she was seen for vomiting and nausea, reduced oral intake, some urinary frequency with abdominal distention over a couple months.  She also lost about 8 pounds in the last couple of weeks.      3/17/2021:  191.  CT AP  IMPRESSION:   1.  Left adnexal soft tissue mass associated with large volume ascites  and foci of peritoneal thickening is suspicious for ovarian/peritoneal  malignancy. Recommend gyne-oncology consultation.  2.  4.1 cm posterior uterine mass is likely a fibroid. Enhancing  nodule on the surface of the uterine fundus could be a subserosal  fibroid or peritoneal deposit.  3.  Focal ill-defined hypoenhancement in the liver, near the falciform  ligament, could be due to focal fatty infiltration versus peritoneal  deposit. Enhancing lesion in segment 8 is favored to be a hemangioma  but needs confirmation with MRI.  4.  Subcentimeter pelvic lymph nodes are indeterminate.  [Access Center: Left adnexal soft tissue mass associated with large  volume ascites and foci of peritoneal thickening is suspicious for  ovarian/peritoneal malignancy. Recommend gyne-oncology consultation.     3/19/21: CT chest  IMPRESSION:   1. No pulmonary nodules or lymphadenopathy.  2. Area of bony sclerosis in the sternum, concern for metastatic  Disease.     3/19/21: MRI abdomen  IMPRESSION: In this patient with concern for left ovarian neoplastic  lesion, the current MRI scan shows:  1. Multiple hepatic lesions, including:-  a. Indeterminate segment 6 subcapsular lesion, noting lack  of  retention on the hepatobiliary phase and T2 hyperintensity.  Possibility of a sclerosing hemangioma should be considered along with  possibility of small metastatic lesion (considered less likely). If  this subcapsular lesion is not evaluated in the operating room as part  of staging procedure, recommend three-month follow-up MRI exam using  Eovist as intravenous contrast.  b. Hypodensity along the falciform ligament seen on recent CT exam has  been characterized by this MRI exam as focal fatty infiltration.  c. Multiple benign lesions, including at least 3 focal nodular  hyperplasias and hemangioma.  2. Moderate to large volume ascites with mild omental thickening,  presumably malignant in the setting of ovarian malignancy. This is  better characterized on CT abdomen and pelvis exam.  3. Layering sludge in the gallbladder.     4/1/21: CEA <0.5. CA 19-9 9.     4/6/21:  Diagnostic laparoscopy with biopsies, converted to exploratory laparotomy, modified radical hysterectomy, bilateral salpingo-oophorectomy, bilateral ureterolysis, bladder peritonectomy, omentectomy, diaphragm stripping, liver mobilization, colon mobilization, small bowel serosa oversewing, resection of abdominal wall nodule  Partial hepatectomy performed by Surgical Oncology (Dr. Zuñiga)   Optimal debulking to no residual macroscopic residual disease R0  FINAL DIAGNOSIS:   A. OVARY, RIGHT, BIOPSY:   - Serous borderline tumor   B. NODULE, CUL DE SAC, EXCISION:   - Positive for serous carcinoma, low grade   C. OMENTUM, BIOPSY:   - Positive for serous carcinoma   D. OVARY AND FALLOPIAN TUBE, LEFT, SALPINGO-OOPHORECTOMY:   - Serous carcinoma of the ovary, low grade on a background of a borderline    tumor   - Multiple follicular cysts   - Fallopian tube with serosal involvement by low grade serous carcinoma   E. UTERUS, CERVIX, RIGHT FALLOPIAN TUBE AND OVARY, HYSTERECTOMY WITH RIGHT    SALPINGO-OOPHORECTOMY:   - Secretory-type endometrium   -  Leiomyomas and one adenomyoma   - Uterine serosal involvement by low grade serous carcinoma,   endometriosis/endosalpingiosis and fibrous   adhesions   - Ovary with surface serous borderline tumor and fibrous adhesions   - Fallopian tube serosal inflammation and adhesions   F. NODULE, CUL DE SAC, EXCISION #2:   - Serosal hyperplasia and calcification   - Fibrous adhesions   - Negative for malignancy   G. NODULE, LEFT PELVIC SIDEWALL, EXCISION:   - Fibroadipose tissue, positive for low grade serous carcinoma   H. FALCIFORM LIGAMENT, BIOPSY:   - Fibroadipose tissue with endosalpingiosis   - Negative for malignancy   I. OMENTUM, OMENTECTOMY:   - Omental adipose tissue, positive for low grade serous carcinoma   (non-invasive implants)   - Omental lymph node positive for metastatic low grade serous carcinoma,   and endosalpingiosis   J. NODULE, RIGHT LOWER QUADRANT ABDOMINAL WALL, EXCISION:   - Fibroadipose tissue, positive for low grade serous carcinoma   K. RIGHT EVERETT-DIAPHRAGM PERITONEUM, EXCISION:   - Fibroadipose tissue, positive for low grade serous carcinoma   - Endosalpingiosis, calcifications and fibrous adhesions   L. NODULE, LIVER SURFACE, EXCISION:   - Hepatic parenchyma and capsular endosalpingiosis   - Negative for malignancy   M. NODULE, SMALL BOWEL SEROSAL, EXCISION:   - Serosa with inflammation, necrosis and mesothelial hyperplasia   - Negative for malignancy   N. NODULE, SMALL BOWEL MESENTERY, EXCISION:   - Fibroadipose tissue, positive for low grade serous carcinoma   - Serosa with inflammation, necrosis and mesothelial hyperplasia   O. NODULE, SMALL BOWEL MESENTERY, EXCISION #2:   - Serosa with inflammation, necrosis and mesothelial hyperplasia   P. LIVER, PARTIAL LEFT HEPATECTOMY LEVEL 3:   - Focal nodular hyperplasia, FNH; negative for malignancy     (with typical trichrome stain and map-like pattern on glutamine   synthetase immunohistochemistry, with   appropriate controls)      Plan: Carboplatin  AUC 6 and paclitaxel 175 mg/m2 x 6 cycles.     5/10/21: Cycle 1 carboplatin and paclitaxel.   14.  6/2/21: Cycle 2 carboplatin and paclitaxel.   8.  6/23/21: Cycle 3 carboplatin and paclitaxel.  Deferred for neutropenia given on 6/28.  Refused Neulasta.   9.     7/19/21:  9.     Recommendation for letrazole 2.5 mg daily for maintenance which she has declined.     12/3/21:  9.  6/22/22:  pending.                 Today she comes to clinic feeling well overall and is without concern.  Her family recently sold their house and is planning to travel the country for a year starting the end of August.  She is hoping that she can complete her follow-ups every 4 months instead of every 3 months.  She denies any vaginal bleeding, no changes in her bowel or bladder habits, no nausea/emesis, no lower extremity edema, and no difficulties eating or sleeping. She denies any abdominal discomfort/bloating, no fevers or chills, and no chest pain or shortness of breath.  She is due for her annual physical and mammogram.  She is not vaccinated against COVID.                          Review of Systems:    Systemic           no weight changes; no fever; no chills; no night sweats; no appetite changes  Skin           no rashes, or lesions  Eye           no irritation; no changes in vision  Tiffany-Laryngeal           no dysphagia; no hoarseness   Pulmonary    no cough; no shortness of breath  Cardiovascular    no chest pain; no palpitations  Gastrointestinal    no diarrhea; no constipation; no abdominal pain; no changes in bowel habits; no blood in stool  Genitourinary   no urinary frequency; no urinary urgency; no dysuria; no pain; no abnormal vaginal discharge; no abnormal vaginal bleeding  Breast   no breast discharge; no breast changes; no breast pain  Musculoskeletal    no myalgias; no arthralgias; no back pain  Psychiatric           no depressed mood; no anxiety    Hematologic   no tender lymph nodes;  no noticeable swellings or lumps   Endocrine    no hot flashes; no heat/cold intolerance         Neurological   no tremor; no numbness and tingling; no headaches; no difficulty sleeping      Past Medical History:    Past Medical History:   Diagnosis Date     Endometriosis          Past Surgical History:    Past Surgical History:   Procedure Laterality Date     GYN SURGERY      c section      HC TOOTH EXTRACTION W/FORCEP       HEPATECTOMY PARTIAL  4/6/2021    Procedure: Partial left hepatectomy level 3;  Surgeon: Chris Zuñiga MD;  Location: [facility] OR     LAPAROSCOPIC ABLATION ENDOMETRIOSIS      2007     LAPAROSCOPY DIAGNOSTIC (GYN) N/A 4/6/2021    Procedure: Diagnostic laparoscopy with biopsies converted to exploratory laparotmy, modified radical hysterectomy bilateral salpingo-oophorectomy, bilateral ureterolysis, peritonectomy, omentectomy, resection of liver nodule, diapraghm stripping, liver mobilization, colon mobilization, resection of abdominal wall nodule, tumor debulking 2R0, no macroscopic residual disease;  Surgeon: Ben Ch         Health Maintenance Due   Topic Date Due     PREVENTIVE CARE VISIT  Never done     ADVANCE CARE PLANNING  Never done     MAMMO SCREENING  Never done     COVID-19 Vaccine (1) Never done     Pneumococcal Vaccine: Pediatrics (0 to 5 Years) and At-Risk Patients (6 to 64 Years) (1 - PCV) Never done     HIV SCREENING  Never done     HEPATITIS C SCREENING  Never done     DTAP/TDAP/TD IMMUNIZATION (1 - Tdap) Never done     PHQ-2 (once per calendar year)  01/01/2022       Current Medications:     Current Outpatient Medications   Medication Sig Dispense Refill     acetaminophen (TYLENOL) 325 MG tablet Take 2 tablets (650 mg) by mouth every 6 hours 60 tablet 0     multiple vitamin  tablet TABS Take 1 tablet by mouth daily       senna-docusate (SENOKOT-S/PERICOLACE) 8.6-50 MG tablet Take 2 tablets by mouth 2 times daily as needed for constipation 60 tablet 0         Allergies:       "  Allergies   Allergen Reactions     Amoxicillin Hives     Full body hives after amoxacillin & Vicodin after wisdom teeth pulled when around 29 yo.     Vicodin [Hydrocodone-Acetaminophen] Rash        Social History:     Social History     Tobacco Use     Smoking status: Never Smoker     Smokeless tobacco: Never Used   Substance Use Topics     Alcohol use: No       History   Drug Use No         Family History:     The patient's family history is notable for:    Family History   Problem Relation Age of Onset     Blood Disease Mother         hemachromatosis      Diabetes Maternal Grandmother      Alzheimer Disease Maternal Grandmother      Multiple Sclerosis Maternal Grandmother      Pancreatic Cancer Paternal Grandmother      Leukemia Paternal Grandfather      Anesthesia Reaction No family hx of      Deep Vein Thrombosis (DVT) No family hx of          Physical Exam:     /66 (BP Location: Left arm, Patient Position: Sitting)   Pulse 55   Temp 98.9  F (37.2  C) (Oral)   Resp 16   Ht 1.575 m (5' 2\")   Wt 50.8 kg (111 lb 14.4 oz)   LMP 03/11/2021   SpO2 100%   BMI 20.47 kg/m    Body mass index is 20.47 kg/m .    General Appearance: healthy and alert, no distress     HEENT: no palpable nodules or masses        Cardiovascular: regular rate and rhythm, no gallops, rubs or murmurs     Respiratory: lungs clear, no rales, rhonchi or wheezes    Musculoskeletal: extremities non tender and without edema    Skin: no lesions or rashes     Neurological: normal gait, no gross defects     Psychiatric: appropriate mood and affect                               Hematological: normal cervical, supraclavicular and inguinal lymph nodes     Gastrointestinal:       abdomen soft, non-tender, non-distended, no organomegaly or masses    Genitourinary: External genitalia and urethral meatus appears normal.  Vagina is smooth without nodularity or masses.  Cervix is surgically absent.  Bimanual exam reveal no masses, nodularity or " fullness.  Recto-vaginal exam confirms these findings.      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  She completed treatment with 3 cycles of paclitaxel and carboplatin per her request on 6/23/21.  She presents for a surveillance visit.     20 minutes spent on the date of the encounter doing chart review, history and exam, documentation, and further activities as noted above.      Plan:     1.)        Ovarian cancer:  GEORGE on exam.  RTC in 4 months for next surveillance visit and CA-125 level.  Plan for surveillance visits and CA-125 levels every 3-4 months for first 2 years (6/2023), followed by every 6 months for an additional 3 years (6/2026), then annually.  Reviewed signs and symptoms for when she should contact the clinic or seek additional care.  Patient to contact the clinic with any questions or concerns in the interim.        Genetic risk factors were assessed and she was negative for mutations in BRCA 1/2.       Labs and/or tests ordered include:  .                2.)        Health maintenance:  Issues addressed today include following up with PCP for annual health maintenance and non-gynecologic issues.       Bhavani Torrez, ROSAURA, APRN, FNP-C  Nurse Practitioner  Division of Gynecologic Oncology  Pager: [phone]     CC  Patient Care Team:  No Ref-Primary, Physician as PCP - General  Karol Hairston MD as Assigned PCP  Luis Ch MD as MD (Gynecologic Oncology)  Karol Hairston MD as Referring Physician (Family Medicine)  Bhavani Torrez APRN CNP as Assigned Cancer Care Provider

## 2022-06-22 ENCOUNTER — ONCOLOGY VISIT (OUTPATIENT)
Dept: ONCOLOGY | Facility: CLINIC | Age: 42
End: 2022-06-22
Payer: COMMERCIAL

## 2022-06-22 ENCOUNTER — LAB (OUTPATIENT)
Dept: LAB | Facility: CLINIC | Age: 42
End: 2022-06-22
Payer: COMMERCIAL

## 2022-06-22 VITALS
OXYGEN SATURATION: 100 % | BODY MASS INDEX: 20.59 KG/M2 | WEIGHT: 111.9 LBS | HEART RATE: 55 BPM | DIASTOLIC BLOOD PRESSURE: 66 MMHG | SYSTOLIC BLOOD PRESSURE: 115 MMHG | HEIGHT: 62 IN | RESPIRATION RATE: 16 BRPM | TEMPERATURE: 98.9 F

## 2022-06-22 DIAGNOSIS — Z85.43 ENCOUNTER FOR FOLLOW-UP SURVEILLANCE OF OVARIAN CANCER: ICD-10-CM

## 2022-06-22 DIAGNOSIS — Z08 ENCOUNTER FOR FOLLOW-UP SURVEILLANCE OF OVARIAN CANCER: ICD-10-CM

## 2022-06-22 DIAGNOSIS — C56.9 OVARIAN CANCER, UNSPECIFIED LATERALITY (H): ICD-10-CM

## 2022-06-22 DIAGNOSIS — C56.9 OVARIAN CANCER, UNSPECIFIED LATERALITY (H): Primary | ICD-10-CM

## 2022-06-22 LAB
CANCER AG125 SERPL-ACNC: 20 U/ML
HOLD SPECIMEN: NORMAL
HOLD SPECIMEN: NORMAL

## 2022-06-22 PROCEDURE — 86304 IMMUNOASSAY TUMOR CA 125: CPT

## 2022-06-22 PROCEDURE — 99213 OFFICE O/P EST LOW 20 MIN: CPT | Performed by: NURSE PRACTITIONER

## 2022-06-22 PROCEDURE — 36415 COLL VENOUS BLD VENIPUNCTURE: CPT

## 2022-06-22 ASSESSMENT — PAIN SCALES - GENERAL: PAINLEVEL: NO PAIN (0)

## 2022-06-22 NOTE — LETTER
2022         RE: Kayy Salazar  [id number] [address] [place], [place] [address] [address]        Dear Colleague,    Thank you for referring your patient, Kayy Salazar, to the [facility]. Please see a copy of my visit note below.    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  She completed treatment with 3 cycles of paclitaxel and carboplatin per her request on 21.  She presents for a surveillance visit.       Oncology History:     Initially, she was seen for vomiting and nausea, reduced oral intake, some urinary frequency with abdominal distention over a couple months.  She also lost about 8 pounds in the last couple of weeks.      3/17/2021:  191.  CT AP  IMPRESSION:   1.  Left adnexal soft tissue mass associated with large volume ascites  and foci of peritoneal thickening is suspicious for ovarian/peritoneal  malignancy. Recommend gyne-oncology consultation.  2.  4.1 cm posterior uterine mass is likely a fibroid. Enhancing  nodule on the surface of the uterine fundus could be a subserosal  fibroid or peritoneal deposit.  3.  Focal ill-defined hypoenhancement in the liver, near the falciform  ligament, could be due to focal fatty infiltration versus peritoneal  deposit. Enhancing lesion in segment 8 is favored to be a hemangioma  but needs confirmation with MRI.  4.  Subcentimeter pelvic lymph nodes are indeterminate.  [Access Center: Left adnexal soft tissue mass associated with large  volume ascites and foci of peritoneal thickening is suspicious for  ovarian/peritoneal malignancy. Recommend gyne-oncology consultation.     3/19/21: CT chest  IMPRESSION:   1. No pulmonary nodules or lymphadenopathy.  2. Area of bony sclerosis in the sternum,  concern for metastatic  Disease.     3/19/21: MRI abdomen  IMPRESSION: In this patient with concern for left ovarian neoplastic  lesion, the current MRI scan shows:  1. Multiple hepatic lesions, including:-  a. Indeterminate segment 6 subcapsular lesion, noting lack of  retention on the hepatobiliary phase and T2 hyperintensity.  Possibility of a sclerosing hemangioma should be considered along with  possibility of small metastatic lesion (considered less likely). If  this subcapsular lesion is not evaluated in the operating room as part  of staging procedure, recommend three-month follow-up MRI exam using  Eovist as intravenous contrast.  b. Hypodensity along the falciform ligament seen on recent CT exam has  been characterized by this MRI exam as focal fatty infiltration.  c. Multiple benign lesions, including at least 3 focal nodular  hyperplasias and hemangioma.  2. Moderate to large volume ascites with mild omental thickening,  presumably malignant in the setting of ovarian malignancy. This is  better characterized on CT abdomen and pelvis exam.  3. Layering sludge in the gallbladder.     4/1/21: CEA <0.5. CA 19-9 9.     4/6/21:  Diagnostic laparoscopy with biopsies, converted to exploratory laparotomy, modified radical hysterectomy, bilateral salpingo-oophorectomy, bilateral ureterolysis, bladder peritonectomy, omentectomy, diaphragm stripping, liver mobilization, colon mobilization, small bowel serosa oversewing, resection of abdominal wall nodule  Partial hepatectomy performed by Surgical Oncology (Dr. Zuñiga)   Optimal debulking to no residual macroscopic residual disease R0  FINAL DIAGNOSIS:   A. OVARY, RIGHT, BIOPSY:   - Serous borderline tumor   B. NODULE, CUL DE SAC, EXCISION:   - Positive for serous carcinoma, low grade   C. OMENTUM, BIOPSY:   - Positive for serous carcinoma   D. OVARY AND FALLOPIAN TUBE, LEFT, SALPINGO-OOPHORECTOMY:   - Serous carcinoma of the ovary, low grade on a background of a  borderline    tumor   - Multiple follicular cysts   - Fallopian tube with serosal involvement by low grade serous carcinoma   E. UTERUS, CERVIX, RIGHT FALLOPIAN TUBE AND OVARY, HYSTERECTOMY WITH RIGHT    SALPINGO-OOPHORECTOMY:   - Secretory-type endometrium   - Leiomyomas and one adenomyoma   - Uterine serosal involvement by low grade serous carcinoma,   endometriosis/endosalpingiosis and fibrous   adhesions   - Ovary with surface serous borderline tumor and fibrous adhesions   - Fallopian tube serosal inflammation and adhesions   F. NODULE, CUL DE SAC, EXCISION #2:   - Serosal hyperplasia and calcification   - Fibrous adhesions   - Negative for malignancy   G. NODULE, LEFT PELVIC SIDEWALL, EXCISION:   - Fibroadipose tissue, positive for low grade serous carcinoma   H. FALCIFORM LIGAMENT, BIOPSY:   - Fibroadipose tissue with endosalpingiosis   - Negative for malignancy   I. OMENTUM, OMENTECTOMY:   - Omental adipose tissue, positive for low grade serous carcinoma   (non-invasive implants)   - Omental lymph node positive for metastatic low grade serous carcinoma,   and endosalpingiosis   J. NODULE, RIGHT LOWER QUADRANT ABDOMINAL WALL, EXCISION:   - Fibroadipose tissue, positive for low grade serous carcinoma   K. RIGHT EVERETT-DIAPHRAGM PERITONEUM, EXCISION:   - Fibroadipose tissue, positive for low grade serous carcinoma   - Endosalpingiosis, calcifications and fibrous adhesions   L. NODULE, LIVER SURFACE, EXCISION:   - Hepatic parenchyma and capsular endosalpingiosis   - Negative for malignancy   M. NODULE, SMALL BOWEL SEROSAL, EXCISION:   - Serosa with inflammation, necrosis and mesothelial hyperplasia   - Negative for malignancy   N. NODULE, SMALL BOWEL MESENTERY, EXCISION:   - Fibroadipose tissue, positive for low grade serous carcinoma   - Serosa with inflammation, necrosis and mesothelial hyperplasia   O. NODULE, SMALL BOWEL MESENTERY, EXCISION #2:   - Serosa with inflammation, necrosis and mesothelial hyperplasia    P. LIVER, PARTIAL LEFT HEPATECTOMY LEVEL 3:   - Focal nodular hyperplasia, FNH; negative for malignancy     (with typical trichrome stain and map-like pattern on glutamine   synthetase immunohistochemistry, with   appropriate controls)      Plan: Carboplatin AUC 6 and paclitaxel 175 mg/m2 x 6 cycles.     5/10/21: Cycle 1 carboplatin and paclitaxel.   14.  6/2/21: Cycle 2 carboplatin and paclitaxel.   8.  6/23/21: Cycle 3 carboplatin and paclitaxel.  Deferred for neutropenia given on 6/28.  Refused Neulasta.   9.     7/19/21:  9.     Recommendation for letrazole 2.5 mg daily for maintenance which she has declined.     12/3/21:  9.  6/22/22:  pending.                 Today she comes to clinic feeling well overall and is without concern.  Her family recently sold their house and is planning to travel the country for a year starting the end of August.  She is hoping that she can complete her follow-ups every 4 months instead of every 3 months.  She denies any vaginal bleeding, no changes in her bowel or bladder habits, no nausea/emesis, no lower extremity edema, and no difficulties eating or sleeping. She denies any abdominal discomfort/bloating, no fevers or chills, and no chest pain or shortness of breath.  She is due for her annual physical and mammogram.  She is not vaccinated against COVID.                          Review of Systems:    Systemic           no weight changes; no fever; no chills; no night sweats; no appetite changes  Skin           no rashes, or lesions  Eye           no irritation; no changes in vision  Tiffany-Laryngeal           no dysphagia; no hoarseness   Pulmonary    no cough; no shortness of breath  Cardiovascular    no chest pain; no palpitations  Gastrointestinal    no diarrhea; no constipation; no abdominal pain; no changes in bowel habits; no blood in stool  Genitourinary   no urinary frequency; no urinary urgency; no dysuria; no pain; no abnormal vaginal  discharge; no abnormal vaginal bleeding  Breast   no breast discharge; no breast changes; no breast pain  Musculoskeletal    no myalgias; no arthralgias; no back pain  Psychiatric           no depressed mood; no anxiety    Hematologic   no tender lymph nodes; no noticeable swellings or lumps   Endocrine    no hot flashes; no heat/cold intolerance         Neurological   no tremor; no numbness and tingling; no headaches; no difficulty sleeping      Past Medical History:    Past Medical History:   Diagnosis Date     Endometriosis          Past Surgical History:    Past Surgical History:   Procedure Laterality Date     GYN SURGERY      c section      HC TOOTH EXTRACTION W/FORCEP       HEPATECTOMY PARTIAL  4/6/2021    Procedure: Partial left hepatectomy level 3;  Surgeon: Chris Zuñiga MD;  Location: [facility] OR     LAPAROSCOPIC ABLATION ENDOMETRIOSIS      2007     LAPAROSCOPY DIAGNOSTIC (GYN) N/A 4/6/2021    Procedure: Diagnostic laparoscopy with biopsies converted to exploratory laparotmy, modified radical hysterectomy bilateral salpingo-oophorectomy, bilateral ureterolysis, peritonectomy, omentectomy, resection of liver nodule, diapraghm stripping, liver mobilization, colon mobilization, resection of abdominal wall nodule, tumor debulking 2R0, no macroscopic residual disease;  Surgeon: Ben Ch         Health Maintenance Due   Topic Date Due     PREVENTIVE CARE VISIT  Never done     ADVANCE CARE PLANNING  Never done     MAMMO SCREENING  Never done     COVID-19 Vaccine (1) Never done     Pneumococcal Vaccine: Pediatrics (0 to 5 Years) and At-Risk Patients (6 to 64 Years) (1 - PCV) Never done     HIV SCREENING  Never done     HEPATITIS C SCREENING  Never done     DTAP/TDAP/TD IMMUNIZATION (1 - Tdap) Never done     PHQ-2 (once per calendar year)  01/01/2022       Current Medications:     Current Outpatient Medications   Medication Sig Dispense Refill     acetaminophen (TYLENOL) 325 MG tablet Take 2 tablets (650 mg)  "by mouth every 6 hours 60 tablet 0     multiple vitamin  tablet TABS Take 1 tablet by mouth daily       senna-docusate (SENOKOT-S/PERICOLACE) 8.6-50 MG tablet Take 2 tablets by mouth 2 times daily as needed for constipation 60 tablet 0         Allergies:        Allergies   Allergen Reactions     Amoxicillin Hives     Full body hives after amoxacillin & Vicodin after wisdom teeth pulled when around 29 yo.     Vicodin [Hydrocodone-Acetaminophen] Rash        Social History:     Social History     Tobacco Use     Smoking status: Never Smoker     Smokeless tobacco: Never Used   Substance Use Topics     Alcohol use: No       History   Drug Use No         Family History:     The patient's family history is notable for:    Family History   Problem Relation Age of Onset     Blood Disease Mother         hemachromatosis      Diabetes Maternal Grandmother      Alzheimer Disease Maternal Grandmother      Multiple Sclerosis Maternal Grandmother      Pancreatic Cancer Paternal Grandmother      Leukemia Paternal Grandfather      Anesthesia Reaction No family hx of      Deep Vein Thrombosis (DVT) No family hx of          Physical Exam:     /66 (BP Location: Left arm, Patient Position: Sitting)   Pulse 55   Temp 98.9  F (37.2  C) (Oral)   Resp 16   Ht 1.575 m (5' 2\")   Wt 50.8 kg (111 lb 14.4 oz)   LMP 03/11/2021   SpO2 100%   BMI 20.47 kg/m    Body mass index is 20.47 kg/m .    General Appearance: healthy and alert, no distress     HEENT: no palpable nodules or masses        Cardiovascular: regular rate and rhythm, no gallops, rubs or murmurs     Respiratory: lungs clear, no rales, rhonchi or wheezes    Musculoskeletal: extremities non tender and without edema    Skin: no lesions or rashes     Neurological: normal gait, no gross defects     Psychiatric: appropriate mood and affect                               Hematological: normal cervical, supraclavicular and inguinal lymph nodes     Gastrointestinal:       abdomen " soft, non-tender, non-distended, no organomegaly or masses    Genitourinary: External genitalia and urethral meatus appears normal.  Vagina is smooth without nodularity or masses.  Cervix is surgically absent.  Bimanual exam reveal no masses, nodularity or fullness.  Recto-vaginal exam confirms these findings.      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  She completed treatment with 3 cycles of paclitaxel and carboplatin per her request on 6/23/21.  She presents for a surveillance visit.     20 minutes spent on the date of the encounter doing chart review, history and exam, documentation, and further activities as noted above.      Plan:     1.)        Ovarian cancer:  GEORGE on exam.  RTC in 4 months for next surveillance visit and CA-125 level.  Plan for surveillance visits and CA-125 levels every 3-4 months for first 2 years (6/2023), followed by every 6 months for an additional 3 years (6/2026), then annually.  Reviewed signs and symptoms for when she should contact the clinic or seek additional care.  Patient to contact the clinic with any questions or concerns in the interim.        Genetic risk factors were assessed and she was negative for mutations in BRCA 1/2.       Labs and/or tests ordered include:  .                2.)        Health maintenance:  Issues addressed today include following up with PCP for annual health maintenance and non-gynecologic issues.       Bhavani Torrez, ROSAURA, APRN, FNP-C  Nurse Practitioner  Division of Gynecologic Oncology  Pager: [phone]     CC  Patient Care Team:  No Ref-Primary, Physician as PCP - General  Karol Hairston MD as Assigned PCP  Luis Ch MD as MD (Gynecologic Oncology)  Karol Hairston MD as Referring Physician (Family Medicine)  Bhavani Torrez APRN CNP as Assigned Cancer Care Provider          Again, thank you for allowing me to participate in the care  of your patient.        Sincerely,        PAOLO Wasserman CNP

## 2022-06-23 ENCOUNTER — TELEPHONE (OUTPATIENT)
Dept: ONCOLOGY | Facility: CLINIC | Age: 42
End: 2022-06-23

## 2022-06-23 NOTE — TELEPHONE ENCOUNTER
I called and left a voice mail to schedule a  lab in one month around July 22nd per Bhavani Torrez's inLedgerXet meikmil-npt-62/23/2022

## 2022-07-10 ENCOUNTER — HEALTH MAINTENANCE LETTER (OUTPATIENT)
Age: 42
End: 2022-07-10

## 2022-07-20 ENCOUNTER — LAB (OUTPATIENT)
Dept: LAB | Facility: CLINIC | Age: 42
End: 2022-07-20
Payer: COMMERCIAL

## 2022-07-20 DIAGNOSIS — Z85.43 ENCOUNTER FOR FOLLOW-UP SURVEILLANCE OF OVARIAN CANCER: ICD-10-CM

## 2022-07-20 DIAGNOSIS — C56.9 OVARIAN CANCER, UNSPECIFIED LATERALITY (H): ICD-10-CM

## 2022-07-20 DIAGNOSIS — Z08 ENCOUNTER FOR FOLLOW-UP SURVEILLANCE OF OVARIAN CANCER: ICD-10-CM

## 2022-07-20 LAB — CANCER AG125 SERPL-ACNC: 17 U/ML

## 2022-07-20 PROCEDURE — 36415 COLL VENOUS BLD VENIPUNCTURE: CPT

## 2022-07-20 PROCEDURE — 86304 IMMUNOASSAY TUMOR CA 125: CPT

## 2022-09-10 ENCOUNTER — HEALTH MAINTENANCE LETTER (OUTPATIENT)
Age: 42
End: 2022-09-10

## 2022-10-18 NOTE — PROGRESS NOTES
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 She completed treatment with 3 cycles of paclitaxel and carboplatin per her request on 21.  She presents for a surveillance visit.         Oncology History:     Initially, she was seen for vomiting and nausea, reduced oral intake, some urinary frequency with abdominal distention over a couple months.  She also lost about 8 pounds in the last couple of weeks.      3/17/2021:  191.  CT AP  IMPRESSION:   1.  Left adnexal soft tissue mass associated with large volume ascites  and foci of peritoneal thickening is suspicious for ovarian/peritoneal  malignancy. Recommend gyne-oncology consultation.  2.  4.1 cm posterior uterine mass is likely a fibroid. Enhancing  nodule on the surface of the uterine fundus could be a subserosal  fibroid or peritoneal deposit.  3.  Focal ill-defined hypoenhancement in the liver, near the falciform  ligament, could be due to focal fatty infiltration versus peritoneal  deposit. Enhancing lesion in segment 8 is favored to be a hemangioma  but needs confirmation with MRI.  4.  Subcentimeter pelvic lymph nodes are indeterminate.  [Access Center: Left adnexal soft tissue mass associated with large  volume ascites and foci of peritoneal thickening is suspicious for  ovarian/peritoneal malignancy. Recommend gyne-oncology consultation.     3/19/21: CT chest  IMPRESSION:   1. No pulmonary nodules or lymphadenopathy.  2. Area of bony sclerosis in the sternum, concern for metastatic  Disease.     3/19/21: MRI abdomen  IMPRESSION: In this patient with concern for left ovarian neoplastic  lesion, the current MRI scan shows:  1. Multiple hepatic lesions, including:-  a. Indeterminate segment 6 subcapsular lesion, noting lack of  retention on the hepatobiliary  phase and T2 hyperintensity.  Possibility of a sclerosing hemangioma should be considered along with  possibility of small metastatic lesion (considered less likely). If  this subcapsular lesion is not evaluated in the operating room as part  of staging procedure, recommend three-month follow-up MRI exam using  Eovist as intravenous contrast.  b. Hypodensity along the falciform ligament seen on recent CT exam has  been characterized by this MRI exam as focal fatty infiltration.  c. Multiple benign lesions, including at least 3 focal nodular  hyperplasias and hemangioma.  2. Moderate to large volume ascites with mild omental thickening,  presumably malignant in the setting of ovarian malignancy. This is  better characterized on CT abdomen and pelvis exam.  3. Layering sludge in the gallbladder.     4/1/21: CEA <0.5. CA 19-9 9.     4/6/21:  Diagnostic laparoscopy with biopsies, converted to exploratory laparotomy, modified radical hysterectomy, bilateral salpingo-oophorectomy, bilateral ureterolysis, bladder peritonectomy, omentectomy, diaphragm stripping, liver mobilization, colon mobilization, small bowel serosa oversewing, resection of abdominal wall nodule  Partial hepatectomy performed by Surgical Oncology (Dr. Zuñiga)   Optimal debulking to no residual macroscopic residual disease R0  FINAL DIAGNOSIS:   A. OVARY, RIGHT, BIOPSY:   - Serous borderline tumor   B. NODULE, CUL DE SAC, EXCISION:   - Positive for serous carcinoma, low grade   C. OMENTUM, BIOPSY:   - Positive for serous carcinoma   D. OVARY AND FALLOPIAN TUBE, LEFT, SALPINGO-OOPHORECTOMY:   - Serous carcinoma of the ovary, low grade on a background of a borderline    tumor   - Multiple follicular cysts   - Fallopian tube with serosal involvement by low grade serous carcinoma   E. UTERUS, CERVIX, RIGHT FALLOPIAN TUBE AND OVARY, HYSTERECTOMY WITH RIGHT    SALPINGO-OOPHORECTOMY:   - Secretory-type endometrium   - Leiomyomas and one adenomyoma   - Uterine  serosal involvement by low grade serous carcinoma,   endometriosis/endosalpingiosis and fibrous   adhesions   - Ovary with surface serous borderline tumor and fibrous adhesions   - Fallopian tube serosal inflammation and adhesions   F. NODULE, CUL DE SAC, EXCISION #2:   - Serosal hyperplasia and calcification   - Fibrous adhesions   - Negative for malignancy   G. NODULE, LEFT PELVIC SIDEWALL, EXCISION:   - Fibroadipose tissue, positive for low grade serous carcinoma   H. FALCIFORM LIGAMENT, BIOPSY:   - Fibroadipose tissue with endosalpingiosis   - Negative for malignancy   I. OMENTUM, OMENTECTOMY:   - Omental adipose tissue, positive for low grade serous carcinoma   (non-invasive implants)   - Omental lymph node positive for metastatic low grade serous carcinoma,   and endosalpingiosis   J. NODULE, RIGHT LOWER QUADRANT ABDOMINAL WALL, EXCISION:   - Fibroadipose tissue, positive for low grade serous carcinoma   K. RIGHT EVERETT-DIAPHRAGM PERITONEUM, EXCISION:   - Fibroadipose tissue, positive for low grade serous carcinoma   - Endosalpingiosis, calcifications and fibrous adhesions   L. NODULE, LIVER SURFACE, EXCISION:   - Hepatic parenchyma and capsular endosalpingiosis   - Negative for malignancy   M. NODULE, SMALL BOWEL SEROSAL, EXCISION:   - Serosa with inflammation, necrosis and mesothelial hyperplasia   - Negative for malignancy   N. NODULE, SMALL BOWEL MESENTERY, EXCISION:   - Fibroadipose tissue, positive for low grade serous carcinoma   - Serosa with inflammation, necrosis and mesothelial hyperplasia   O. NODULE, SMALL BOWEL MESENTERY, EXCISION #2:   - Serosa with inflammation, necrosis and mesothelial hyperplasia   P. LIVER, PARTIAL LEFT HEPATECTOMY LEVEL 3:   - Focal nodular hyperplasia, FNH; negative for malignancy     (with typical trichrome stain and map-like pattern on glutamine   synthetase immunohistochemistry, with   appropriate controls)      Plan: Carboplatin AUC 6 and paclitaxel 175 mg/m2 x 6  "cycles.     5/10/21: Cycle 1 carboplatin and paclitaxel.   14.  6/2/21: Cycle 2 carboplatin and paclitaxel.   8.  6/23/21: Cycle 3 carboplatin and paclitaxel.  Deferred for neutropenia given on 6/28.  Refused Neulasta.   9.     7/19/21:  9.     Recommendation for letrazole 2.5 mg daily for maintenance which she has declined.     12/3/21:  9.  6/22/22:  20.  7/2022:  17.   10/19/22:  pending              Kayy comes to the clinic today overall feeling well. She does report vaginal spotting with cramping in August. This has occurred twice. Light pink spotting. Pt is sexually active \"barely\". Twice this year. Pt reports that she has deep dysparaneuia with her .  Using KY lube with intercourse. Has tried coconut oil and jellies. Pt has not traveled vaginal replens. She denies a no changes in her bowel or bladder habits, no nausea/emesis, no lower extremity edema, and no difficulties eating or sleeping. She denies any abdominal discomfort/bloating, no fevers or chills, and no chest pain or shortness of breath.          Pt traveling in [place] until next summer.             Health Maintenance  Mammogram- pt does want to do this   Annual physical- due for this           Review of Systems:    Systemic           no weight changes; no fever; no chills; no night sweats; no appetite changes  Skin           no rashes, or lesions  Eye           no irritation; no changes in vision  Tiffany-Laryngeal           no dysphagia; no hoarseness   Pulmonary    no cough; no shortness of breath  Cardiovascular    no chest pain; no palpitations  Gastrointestinal    no diarrhea; no constipation; no abdominal pain; no changes in bowel habits; no blood in stool  Genitourinary   no urinary frequency; no urinary urgency; no dysuria; no pain; no abnormal vaginal discharge; no abnormal vaginal bleeding  Breast   no breast discharge; no breast changes; no breast pain  Musculoskeletal    no " myalgias; no arthralgias; no back pain  Psychiatric           no depressed mood; no anxiety    Hematologic   no tender lymph nodes; no noticeable swellings or lumps   Endocrine    no hot flashes; no heat/cold intolerance         Neurological   no tremor; no numbness and tingling; no headaches; no difficulty sleeping      Past Medical History:    Past Medical History:   Diagnosis Date     Endometriosis          Past Surgical History:    Past Surgical History:   Procedure Laterality Date     GYN SURGERY      c section      HC TOOTH EXTRACTION W/FORCEP       HEPATECTOMY PARTIAL  4/6/2021    Procedure: Partial left hepatectomy level 3;  Surgeon: Chris Zuñiga MD;  Location: [facility] OR     LAPAROSCOPIC ABLATION ENDOMETRIOSIS      2007     LAPAROSCOPY DIAGNOSTIC (GYN) N/A 4/6/2021    Procedure: Diagnostic laparoscopy with biopsies converted to exploratory laparotmy, modified radical hysterectomy bilateral salpingo-oophorectomy, bilateral ureterolysis, peritonectomy, omentectomy, resection of liver nodule, diapraghm stripping, liver mobilization, colon mobilization, resection of abdominal wall nodule, tumor debulking 2R0, no macroscopic residual disease;  Surgeon: Ben Ch         Health Maintenance Due   Topic Date Due     ADVANCE CARE PLANNING  Never done     MAMMO SCREENING  Never done     HEPATITIS B IMMUNIZATION (1 of 3 - 3-dose series) Never done     COVID-19 Vaccine (1) Never done     Pneumococcal Vaccine: Pediatrics (0 to 5 Years) and At-Risk Patients (6 to 64 Years) (1 - PCV) Never done     HIV SCREENING  Never done     HEPATITIS C SCREENING  Never done     DTAP/TDAP/TD IMMUNIZATION (1 - Tdap) Never done     YEARLY PREVENTIVE VISIT  01/24/2007     PHQ-2 (once per calendar year)  01/01/2022     INFLUENZA VACCINE (1) Never done       Current Medications:     Current Outpatient Medications   Medication Sig Dispense Refill     acetaminophen (TYLENOL) 325 MG tablet Take 2 tablets (650 mg) by mouth every 6 hours  "60 tablet 0     multiple vitamin  tablet TABS Take 1 tablet by mouth daily       senna-docusate (SENOKOT-S/PERICOLACE) 8.6-50 MG tablet Take 2 tablets by mouth 2 times daily as needed for constipation 60 tablet 0         Allergies:        Allergies   Allergen Reactions     Amoxicillin Hives     Full body hives after amoxacillin & Vicodin after wisdom teeth pulled when around 29 yo.     Vicodin [Hydrocodone-Acetaminophen] Rash        Social History:     Social History     Tobacco Use     Smoking status: Never     Smokeless tobacco: Never   Substance Use Topics     Alcohol use: No       History   Drug Use No         Family History:     The patient's family history is notable for:    Family History   Problem Relation Age of Onset     Blood Disease Mother         hemachromatosis      Diabetes Maternal Grandmother      Alzheimer Disease Maternal Grandmother      Multiple Sclerosis Maternal Grandmother      Pancreatic Cancer Paternal Grandmother      Leukemia Paternal Grandfather      Anesthesia Reaction No family hx of      Deep Vein Thrombosis (DVT) No family hx of          Physical Exam:     /73 (BP Location: Left arm)   Pulse 55   Temp 98.3  F (36.8  C) (Oral)   Resp 16   Ht 1.575 m (5' 2.01\")   Wt 51.8 kg (114 lb 3.2 oz)   LMP 03/11/2021   SpO2 99%   BMI 20.88 kg/m    Body mass index is 20.88 kg/m .    General Appearance: healthy and alert, no distress     HEENT: no palpable nodules or masses        Cardiovascular: regular rate and rhythm, no gallops, rubs or murmurs     Respiratory: lungs clear, no rales, rhonchi or wheezes    Musculoskeletal: extremities non tender and without edema    Skin: no lesions or rashes     Neurological: normal gait, no gross defects     Psychiatric: appropriate mood and affect                               Hematological: normal cervical, supraclavicular and inguinal lymph nodes     Gastrointestinal:       abdomen soft, non-tender, non-distended, no organomegaly or " masses    Genitourinary: External genitalia and urethral meatus appears normal. Vulva appears atrophic. Vagina is smooth without nodularity or masses; pale vaginal apex.  Cervix is surgically absent.  Bimanual exam reveal no masses, nodularity or fullness.  Recto-vaginal exam confirms these findings.      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  She completed treatment with 3 cycles of paclitaxel and carboplatin per her request on 6/23/21.  She presents for a surveillance visit.     30 minutes spent on the date of the encounter doing chart review, history and exam, documentation, and further activities as noted above.      Plan:     1.)        Ovarian cancer:  GEORGE on exam.  RTC in 4 months for next surveillance visit and CA-125 level.  Plan for surveillance visits and CA-125 levels every 3-4 months for first 2 years (6/2023), followed by every 6 months for an additional 3 years (6/2026), then annually.  Reviewed signs and symptoms for when she should contact the clinic or seek additional care.  Patient to contact the clinic with any questions or concerns in the interim.      2.)       Genetic risk factors were assessed and she was negative for mutations in BRCA 1/2.    3.)        Labs and/or tests ordered include:   pending                 4.)        Health maintenance:  Issues addressed today include following up with PCP for annual health maintenance and non-gynecologic issues. Encouraged mammogram.     5.)        Genitourinary syndrome of menopause with dyspareunia: light pink spotting likely related to vulvar vaginal dryness or hemorrhoids. Has occurred twice and exam normal today. Pt will report any further to our office. Unsure cause of cramping two times however pt aware to watch for trends and let us know    Discussed topical vulvar and vaginal estrogen with Dr. Weston (Dr. Ch on vacation). Reviewed with patient that  low grade is probably estrogen driven, but vaginal estrogen should not be systemically absorbed. Reviewed theoretical risks however we do prescribe to patients that are highly symptomatic and have failed other lubrication efforts. Pt would like to try Replens 3-4 times per week, coconut oil at vulva nightly, and virtual pelvic floor PT. Will continue to review with pt.     Addendum: Per updated message from pt, she would like to try topical Estrogen. Rx sent.         PAOLO Ken, NP-BC  Women's Health Nurse Practitioner  Division of Gynecologic Oncology  [facility]    CC  Patient Care Team:  No Ref-Primary, Physician as PCP - General  Karol Hairston MD as Assigned PCP  Luis Ch MD as MD (Gynecologic Oncology)  Karol Hairston MD as Referring Physician (Family Medicine)  Bhavani Torrez APRN CNP as Assigned Cancer Care Provider

## 2022-10-19 ENCOUNTER — LAB (OUTPATIENT)
Dept: LAB | Facility: CLINIC | Age: 42
End: 2022-10-19
Attending: NURSE PRACTITIONER
Payer: COMMERCIAL

## 2022-10-19 ENCOUNTER — ONCOLOGY VISIT (OUTPATIENT)
Dept: ONCOLOGY | Facility: CLINIC | Age: 42
End: 2022-10-19
Attending: NURSE PRACTITIONER
Payer: COMMERCIAL

## 2022-10-19 VITALS
DIASTOLIC BLOOD PRESSURE: 73 MMHG | SYSTOLIC BLOOD PRESSURE: 116 MMHG | WEIGHT: 114.2 LBS | HEART RATE: 55 BPM | HEIGHT: 62 IN | TEMPERATURE: 98.3 F | OXYGEN SATURATION: 99 % | RESPIRATION RATE: 16 BRPM | BODY MASS INDEX: 21.02 KG/M2

## 2022-10-19 DIAGNOSIS — Z85.43 ENCOUNTER FOR FOLLOW-UP SURVEILLANCE OF OVARIAN CANCER: ICD-10-CM

## 2022-10-19 DIAGNOSIS — C56.9 OVARIAN CANCER, UNSPECIFIED LATERALITY (H): ICD-10-CM

## 2022-10-19 DIAGNOSIS — N94.12 DEEP DYSPAREUNIA IN FEMALE: ICD-10-CM

## 2022-10-19 DIAGNOSIS — Z08 ENCOUNTER FOR FOLLOW-UP SURVEILLANCE OF OVARIAN CANCER: ICD-10-CM

## 2022-10-19 DIAGNOSIS — C56.9 OVARIAN CANCER, UNSPECIFIED LATERALITY (H): Primary | ICD-10-CM

## 2022-10-19 DIAGNOSIS — N95.8 GENITOURINARY SYNDROME OF MENOPAUSE: ICD-10-CM

## 2022-10-19 LAB
CANCER AG125 SERPL-ACNC: 15 U/ML
HOLD SPECIMEN: NORMAL
HOLD SPECIMEN: NORMAL

## 2022-10-19 PROCEDURE — 36415 COLL VENOUS BLD VENIPUNCTURE: CPT

## 2022-10-19 PROCEDURE — 99214 OFFICE O/P EST MOD 30 MIN: CPT | Performed by: OBSTETRICS & GYNECOLOGY

## 2022-10-19 PROCEDURE — 86304 IMMUNOASSAY TUMOR CA 125: CPT

## 2022-10-19 RX ORDER — ESTRADIOL 0.1 MG/G
CREAM VAGINAL
Qty: 42.5 G | Refills: 1 | Status: SHIPPED | OUTPATIENT
Start: 2022-10-19

## 2022-10-19 ASSESSMENT — PAIN SCALES - GENERAL: PAINLEVEL: NO PAIN (0)

## 2022-10-19 NOTE — NURSING NOTE
"Oncology Rooming Note    October 19, 2022 11:26 AM   Kayy Salazar is a 41 year old female who presents for:    Chief Complaint   Patient presents with     Oncology Clinic Visit     4 month follow up     Initial Vitals: /73 (BP Location: Left arm)   Pulse 55   Temp 98.3  F (36.8  C) (Oral)   Resp 16   Ht 1.575 m (5' 2.01\")   Wt 51.8 kg (114 lb 3.2 oz)   LMP 03/11/2021   SpO2 99%   BMI 20.88 kg/m   Estimated body mass index is 20.88 kg/m  as calculated from the following:    Height as of this encounter: 1.575 m (5' 2.01\").    Weight as of this encounter: 51.8 kg (114 lb 3.2 oz). Body surface area is 1.51 meters squared.  No Pain (0) Comment: Data Unavailable   Patient's last menstrual period was 03/11/2021.  Allergies reviewed: Yes  Medications reviewed: Yes    Medications: Medication refills not needed today.  Pharmacy name entered into [facility]:    [id number] [place], [place] - [address] [facility] - [place], [place] - [address] [address], [id number]    Clinical concerns: cramping and spotting occurring a few times over the past couple months.       Brenna Aguilar LPN            "

## 2022-10-19 NOTE — PATIENT INSTRUCTIONS
Things to try for painful intercourse:     Uber lube silicone on Amazon  Vaginal Replens 2-3 times per week for hydration  Organic coconut oil on vulva nightly       Consider vaginal estrogen as our doctors have approved this for you.         PAOLO Ken, NP-BC  Women's Health Nurse Practitioner  Division of Gynecologic Oncology  [facility]

## 2022-10-19 NOTE — LETTER
10/19/2022         RE: Kayy Salazar  [id number] [address] [place] [id number]        Dear Colleague,    Thank you for referring your patient, Kayy Salazar, to the [facility]. Please see a copy of my visit note below.    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 She completed treatment with 3 cycles of paclitaxel and carboplatin per her request on 21.  She presents for a surveillance visit.         Oncology History:     Initially, she was seen for vomiting and nausea, reduced oral intake, some urinary frequency with abdominal distention over a couple months.  She also lost about 8 pounds in the last couple of weeks.      3/17/2021:  191.  CT AP  IMPRESSION:   1.  Left adnexal soft tissue mass associated with large volume ascites  and foci of peritoneal thickening is suspicious for ovarian/peritoneal  malignancy. Recommend gyne-oncology consultation.  2.  4.1 cm posterior uterine mass is likely a fibroid. Enhancing  nodule on the surface of the uterine fundus could be a subserosal  fibroid or peritoneal deposit.  3.  Focal ill-defined hypoenhancement in the liver, near the falciform  ligament, could be due to focal fatty infiltration versus peritoneal  deposit. Enhancing lesion in segment 8 is favored to be a hemangioma  but needs confirmation with MRI.  4.  Subcentimeter pelvic lymph nodes are indeterminate.  [Access Center: Left adnexal soft tissue mass associated with large  volume ascites and foci of peritoneal thickening is suspicious for  ovarian/peritoneal malignancy. Recommend gyne-oncology consultation.     3/19/21: CT chest  IMPRESSION:   1. No pulmonary nodules or lymphadenopathy.  2. Area of bony sclerosis in the sternum, concern for  metastatic  Disease.     3/19/21: MRI abdomen  IMPRESSION: In this patient with concern for left ovarian neoplastic  lesion, the current MRI scan shows:  1. Multiple hepatic lesions, including:-  a. Indeterminate segment 6 subcapsular lesion, noting lack of  retention on the hepatobiliary phase and T2 hyperintensity.  Possibility of a sclerosing hemangioma should be considered along with  possibility of small metastatic lesion (considered less likely). If  this subcapsular lesion is not evaluated in the operating room as part  of staging procedure, recommend three-month follow-up MRI exam using  Eovist as intravenous contrast.  b. Hypodensity along the falciform ligament seen on recent CT exam has  been characterized by this MRI exam as focal fatty infiltration.  c. Multiple benign lesions, including at least 3 focal nodular  hyperplasias and hemangioma.  2. Moderate to large volume ascites with mild omental thickening,  presumably malignant in the setting of ovarian malignancy. This is  better characterized on CT abdomen and pelvis exam.  3. Layering sludge in the gallbladder.     4/1/21: CEA <0.5. CA 19-9 9.     4/6/21:  Diagnostic laparoscopy with biopsies, converted to exploratory laparotomy, modified radical hysterectomy, bilateral salpingo-oophorectomy, bilateral ureterolysis, bladder peritonectomy, omentectomy, diaphragm stripping, liver mobilization, colon mobilization, small bowel serosa oversewing, resection of abdominal wall nodule  Partial hepatectomy performed by Surgical Oncology (Dr. Zuñiga)   Optimal debulking to no residual macroscopic residual disease R0  FINAL DIAGNOSIS:   A. OVARY, RIGHT, BIOPSY:   - Serous borderline tumor   B. NODULE, CUL DE SAC, EXCISION:   - Positive for serous carcinoma, low grade   C. OMENTUM, BIOPSY:   - Positive for serous carcinoma   D. OVARY AND FALLOPIAN TUBE, LEFT, SALPINGO-OOPHORECTOMY:   - Serous carcinoma of the ovary, low grade on a background of a borderline     tumor   - Multiple follicular cysts   - Fallopian tube with serosal involvement by low grade serous carcinoma   E. UTERUS, CERVIX, RIGHT FALLOPIAN TUBE AND OVARY, HYSTERECTOMY WITH RIGHT    SALPINGO-OOPHORECTOMY:   - Secretory-type endometrium   - Leiomyomas and one adenomyoma   - Uterine serosal involvement by low grade serous carcinoma,   endometriosis/endosalpingiosis and fibrous   adhesions   - Ovary with surface serous borderline tumor and fibrous adhesions   - Fallopian tube serosal inflammation and adhesions   F. NODULE, CUL DE SAC, EXCISION #2:   - Serosal hyperplasia and calcification   - Fibrous adhesions   - Negative for malignancy   G. NODULE, LEFT PELVIC SIDEWALL, EXCISION:   - Fibroadipose tissue, positive for low grade serous carcinoma   H. FALCIFORM LIGAMENT, BIOPSY:   - Fibroadipose tissue with endosalpingiosis   - Negative for malignancy   I. OMENTUM, OMENTECTOMY:   - Omental adipose tissue, positive for low grade serous carcinoma   (non-invasive implants)   - Omental lymph node positive for metastatic low grade serous carcinoma,   and endosalpingiosis   J. NODULE, RIGHT LOWER QUADRANT ABDOMINAL WALL, EXCISION:   - Fibroadipose tissue, positive for low grade serous carcinoma   K. RIGHT EVERETT-DIAPHRAGM PERITONEUM, EXCISION:   - Fibroadipose tissue, positive for low grade serous carcinoma   - Endosalpingiosis, calcifications and fibrous adhesions   L. NODULE, LIVER SURFACE, EXCISION:   - Hepatic parenchyma and capsular endosalpingiosis   - Negative for malignancy   M. NODULE, SMALL BOWEL SEROSAL, EXCISION:   - Serosa with inflammation, necrosis and mesothelial hyperplasia   - Negative for malignancy   N. NODULE, SMALL BOWEL MESENTERY, EXCISION:   - Fibroadipose tissue, positive for low grade serous carcinoma   - Serosa with inflammation, necrosis and mesothelial hyperplasia   O. NODULE, SMALL BOWEL MESENTERY, EXCISION #2:   - Serosa with inflammation, necrosis and mesothelial hyperplasia   P.  "LIVER, PARTIAL LEFT HEPATECTOMY LEVEL 3:   - Focal nodular hyperplasia, FNH; negative for malignancy     (with typical trichrome stain and map-like pattern on glutamine   synthetase immunohistochemistry, with   appropriate controls)      Plan: Carboplatin AUC 6 and paclitaxel 175 mg/m2 x 6 cycles.     5/10/21: Cycle 1 carboplatin and paclitaxel.   14.  6/2/21: Cycle 2 carboplatin and paclitaxel.   8.  6/23/21: Cycle 3 carboplatin and paclitaxel.  Deferred for neutropenia given on 6/28.  Refused Neulasta.   9.     7/19/21:  9.     Recommendation for letrazole 2.5 mg daily for maintenance which she has declined.     12/3/21:  9.  6/22/22:  20.  7/2022:  17.   10/19/22:  pending              Kayy comes to the clinic today overall feeling well. She does report vaginal spotting with cramping in August. This has occurred twice. Light pink spotting. Pt is sexually active \"barely\". Twice this year. Pt reports that she has deep dysparaneuia with her .  Using KY lube with intercourse. Has tried coconut oil and jellies. Pt has not traveled vaginal replens. She denies a no changes in her bowel or bladder habits, no nausea/emesis, no lower extremity edema, and no difficulties eating or sleeping. She denies any abdominal discomfort/bloating, no fevers or chills, and no chest pain or shortness of breath.          Pt traveling in [place] until next summer.             Health Maintenance  Mammogram- pt does want to do this   Annual physical- due for this           Review of Systems:    Systemic           no weight changes; no fever; no chills; no night sweats; no appetite changes  Skin           no rashes, or lesions  Eye           no irritation; no changes in vision  Tiffany-Laryngeal           no dysphagia; no hoarseness   Pulmonary    no cough; no shortness of breath  Cardiovascular    no chest pain; no palpitations  Gastrointestinal    no diarrhea; no constipation; no " abdominal pain; no changes in bowel habits; no blood in stool  Genitourinary   no urinary frequency; no urinary urgency; no dysuria; no pain; no abnormal vaginal discharge; no abnormal vaginal bleeding  Breast   no breast discharge; no breast changes; no breast pain  Musculoskeletal    no myalgias; no arthralgias; no back pain  Psychiatric           no depressed mood; no anxiety    Hematologic   no tender lymph nodes; no noticeable swellings or lumps   Endocrine    no hot flashes; no heat/cold intolerance         Neurological   no tremor; no numbness and tingling; no headaches; no difficulty sleeping      Past Medical History:    Past Medical History:   Diagnosis Date     Endometriosis          Past Surgical History:    Past Surgical History:   Procedure Laterality Date     GYN SURGERY      c section      HC TOOTH EXTRACTION W/FORCEP       HEPATECTOMY PARTIAL  4/6/2021    Procedure: Partial left hepatectomy level 3;  Surgeon: Chris Zuñiga MD;  Location: [facility] OR     LAPAROSCOPIC ABLATION ENDOMETRIOSIS      2007     LAPAROSCOPY DIAGNOSTIC (GYN) N/A 4/6/2021    Procedure: Diagnostic laparoscopy with biopsies converted to exploratory laparotmy, modified radical hysterectomy bilateral salpingo-oophorectomy, bilateral ureterolysis, peritonectomy, omentectomy, resection of liver nodule, diapraghm stripping, liver mobilization, colon mobilization, resection of abdominal wall nodule, tumor debulking 2R0, no macroscopic residual disease;  Surgeon: Ben Ch         Health Maintenance Due   Topic Date Due     ADVANCE CARE PLANNING  Never done     MAMMO SCREENING  Never done     HEPATITIS B IMMUNIZATION (1 of 3 - 3-dose series) Never done     COVID-19 Vaccine (1) Never done     Pneumococcal Vaccine: Pediatrics (0 to 5 Years) and At-Risk Patients (6 to 64 Years) (1 - PCV) Never done     HIV SCREENING  Never done     HEPATITIS C SCREENING  Never done     DTAP/TDAP/TD IMMUNIZATION (1 - Tdap) Never done     YEARLY  "PREVENTIVE VISIT  01/24/2007     PHQ-2 (once per calendar year)  01/01/2022     INFLUENZA VACCINE (1) Never done       Current Medications:     Current Outpatient Medications   Medication Sig Dispense Refill     acetaminophen (TYLENOL) 325 MG tablet Take 2 tablets (650 mg) by mouth every 6 hours 60 tablet 0     multiple vitamin  tablet TABS Take 1 tablet by mouth daily       senna-docusate (SENOKOT-S/PERICOLACE) 8.6-50 MG tablet Take 2 tablets by mouth 2 times daily as needed for constipation 60 tablet 0         Allergies:        Allergies   Allergen Reactions     Amoxicillin Hives     Full body hives after amoxacillin & Vicodin after wisdom teeth pulled when around 29 yo.     Vicodin [Hydrocodone-Acetaminophen] Rash        Social History:     Social History     Tobacco Use     Smoking status: Never     Smokeless tobacco: Never   Substance Use Topics     Alcohol use: No       History   Drug Use No         Family History:     The patient's family history is notable for:    Family History   Problem Relation Age of Onset     Blood Disease Mother         hemachromatosis      Diabetes Maternal Grandmother      Alzheimer Disease Maternal Grandmother      Multiple Sclerosis Maternal Grandmother      Pancreatic Cancer Paternal Grandmother      Leukemia Paternal Grandfather      Anesthesia Reaction No family hx of      Deep Vein Thrombosis (DVT) No family hx of          Physical Exam:     /73 (BP Location: Left arm)   Pulse 55   Temp 98.3  F (36.8  C) (Oral)   Resp 16   Ht 1.575 m (5' 2.01\")   Wt 51.8 kg (114 lb 3.2 oz)   LMP 03/11/2021   SpO2 99%   BMI 20.88 kg/m    Body mass index is 20.88 kg/m .    General Appearance: healthy and alert, no distress     HEENT: no palpable nodules or masses        Cardiovascular: regular rate and rhythm, no gallops, rubs or murmurs     Respiratory: lungs clear, no rales, rhonchi or wheezes    Musculoskeletal: extremities non tender and without edema    Skin: no lesions or " rashes     Neurological: normal gait, no gross defects     Psychiatric: appropriate mood and affect                               Hematological: normal cervical, supraclavicular and inguinal lymph nodes     Gastrointestinal:       abdomen soft, non-tender, non-distended, no organomegaly or masses    Genitourinary: External genitalia and urethral meatus appears normal. Vulva appears atrophic. Vagina is smooth without nodularity or masses; pale vaginal apex.  Cervix is surgically absent.  Bimanual exam reveal no masses, nodularity or fullness.  Recto-vaginal exam confirms these findings.      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  She completed treatment with 3 cycles of paclitaxel and carboplatin per her request on 6/23/21.  She presents for a surveillance visit.     30 minutes spent on the date of the encounter doing chart review, history and exam, documentation, and further activities as noted above.      Plan:     1.)        Ovarian cancer:  GEORGE on exam.  RTC in 4 months for next surveillance visit and CA-125 level.  Plan for surveillance visits and CA-125 levels every 3-4 months for first 2 years (6/2023), followed by every 6 months for an additional 3 years (6/2026), then annually.  Reviewed signs and symptoms for when she should contact the clinic or seek additional care.  Patient to contact the clinic with any questions or concerns in the interim.      2.)       Genetic risk factors were assessed and she was negative for mutations in BRCA 1/2.    3.)        Labs and/or tests ordered include:   pending                 4.)        Health maintenance:  Issues addressed today include following up with PCP for annual health maintenance and non-gynecologic issues. Encouraged mammogram.     5.)        Genitourinary syndrome of menopause with dyspareunia: light pink spotting likely related to vulvar vaginal dryness or hemorrhoids. Has  occurred twice and exam normal today. Pt will report any further to our office. Unsure cause of cramping two times however pt aware to watch for trends and let us know    Discussed topical vulvar and vaginal estrogen with Dr. Weston (Dr. Ch on vacation). Reviewed with patient that low grade is probably estrogen driven, but vaginal estrogen should not be systemically absorbed. Reviewed theoretical risks however we do prescribe to patients that are highly symptomatic and have failed other lubrication efforts. Pt would like to try Replens 3-4 times per week, coconut oil at vulva nightly, and virtual pelvic floor PT. Will continue to review with pt.         PAOLO Ken, NP-BC  Women's Health Nurse Practitioner  Division of Gynecologic Oncology  [facility]    CC  Patient Care Team:  No Ref-Primary, Physician as PCP - General  Karol Hairston MD as Assigned PCP  Luis Ch MD as MD (Gynecologic Oncology)  Karol Hairston MD as Referring Physician (Family Medicine)  Bhavani Torrez APRN CNP as Assigned Cancer Care Provider          Again, thank you for allowing me to participate in the care of your patient.        Sincerely,        PAOLO Fisher CNP

## 2022-12-22 NOTE — PROGRESS NOTES
Received phone call from patients family practice RN Karolyn. States that she received call from Mom asking for orders for Clonidine Stim Test. Informed her that we already wrote orders for this test and faxed to [facility], informed RN that I would call Mom to clarify instructions.    Spoke with Mom. Mom states she did call insurance ad gave CPT codes for Clonidine stim test, Mom reports that she was informed the testing would be covered. Mom reports she was told a referral would be needed which is why she had called [facility] family practice office. Informed Mom that next steps are to call [facility] to move forward with scheduling apt. Mom agrees with plan. Mom to give us CB with any further questions.   WOC Preoperative Ostomy Consult    Referral from Dr. Salazar  Consult attended by patient and spouse  Diagnosis: ovarian mass Date of Surgery: 04/06/2021   Type of Surgery: possible HYSTERECTOMY, TOTAL, ABDOMINAL, WITH BILATERAL SALPINGO-OOPHORECTOMY, debulking, possible bowel resection, possible stoma, possible intraperitoneal port placement  Emotional readiness for surgery: no acute distress  Physical Limitations: Without limitations  Abdomen assessed for site by: Patient's ability to visiualize area, avoidance of skin creases and scars, palpating for rectus abdominus muscle and clothing fit  Teaching: Anatomy/picture of stoma, stoma/bowel function postop, intro to pouches, diet, precautions with dehydration/blockage, returning to work/lifting, written/media offered and role of WOC/postop followup explained.    Stoma site marking:  Marking pen and tegaderm   Location chosen: [facility]  [employer]'s Ostomy packet given to patient  Karime Benson NP was available for supervision of care if needed or if questions should arise and regarding plan of care.  Soco Colbert RN RN [facility]ON

## 2023-01-31 ENCOUNTER — TELEPHONE (OUTPATIENT)
Dept: ONCOLOGY | Facility: CLINIC | Age: 43
End: 2023-01-31
Payer: COMMERCIAL

## 2023-06-03 ENCOUNTER — HEALTH MAINTENANCE LETTER (OUTPATIENT)
Age: 43
End: 2023-06-03

## 2023-07-23 ENCOUNTER — HEALTH MAINTENANCE LETTER (OUTPATIENT)
Age: 43
End: 2023-07-23

## 2024-07-07 ENCOUNTER — HEALTH MAINTENANCE LETTER (OUTPATIENT)
Age: 44
End: 2024-07-07

## 2024-09-15 ENCOUNTER — HEALTH MAINTENANCE LETTER (OUTPATIENT)
Age: 44
End: 2024-09-15

## 2025-07-13 ENCOUNTER — HEALTH MAINTENANCE LETTER (OUTPATIENT)
Age: 45
End: 2025-07-13

## (undated) DEVICE — DRSG MEDIPORE 3 1/2X13 3/4" 3573

## (undated) DEVICE — SOL NACL 0.9% IRRIG 1000ML BOTTLE 07138-09

## (undated) DEVICE — ESU ELEC BLADE 6" COATED E1450-6

## (undated) DEVICE — ENDO TROCAR SLEEVE KII ADV FIXATION 05X100MM CFS02

## (undated) DEVICE — SU VICRYL 0 CT-1 36" J346H

## (undated) DEVICE — SURGICEL ABSORBABLE HEMOSTAT SNOW 2"X4" 2082

## (undated) DEVICE — ESU HARMONIC ACE LAP SHEARS ETHICON ACE+ 7 5MMX36CM HARH36

## (undated) DEVICE — LINEN TOWEL PACK X30 5481

## (undated) DEVICE — ESU GROUND PAD THERMOGUARD PLUS ABC PEDS 7-382

## (undated) DEVICE — Device

## (undated) DEVICE — RX SURGIFLO HEMOSTATIC MATRIX W/THROMBIN 8ML 2994

## (undated) DEVICE — SU SILK 3-0 SH CR 8X18" C013D

## (undated) DEVICE — JELLY LUBRICATING SURGILUBE 2OZ TUBE

## (undated) DEVICE — ESU PENCIL W/COATED BLADE E2450H

## (undated) DEVICE — ESU LIGASURE IMPACT OPEN SEALER/DVDR CVD LG JAW LF4418

## (undated) DEVICE — SOL WATER IRRIG 1000ML BOTTLE 2F7114

## (undated) DEVICE — PROTECTOR ARM ONE-STEP TRENDELENBURG 40418

## (undated) DEVICE — WIPES FOLEY CARE SURESTEP PROVON DFC100

## (undated) DEVICE — SUCTION TIP YANKAUER STR K87

## (undated) DEVICE — SPONGE RAY-TEC 4X8" 7318

## (undated) DEVICE — SU PDS II 0 TP-1 60" Z991G

## (undated) DEVICE — BNDG ABDOMINAL BINDER 9X45-62" 79-89071

## (undated) DEVICE — PREP POVIDONE IODINE SCRUB 7.5% 4OZ APL82212

## (undated) DEVICE — ENDO TROCAR BLUNT TIP KII BALLOON 12X100MM C0R47

## (undated) DEVICE — SUCTION IRR STRYKERFLOW II W/TIP 250-070-520

## (undated) DEVICE — ESU PENCIL SMOKE EVAC W/ROCKER SWITCH 0703-047-000

## (undated) DEVICE — SU VICRYL 0 UR-6 27" J603H

## (undated) DEVICE — KIT PATIENT POSITIONING PIGAZZI LATEX FREE 40580

## (undated) DEVICE — SU PDS II 0 CT-1 27" Z340H

## (undated) DEVICE — GLOVE PROTEXIS BLUE W/NEU-THERA 8.0  2D73EB80

## (undated) DEVICE — SOL NACL 0.9% IRRIG 1000ML BOTTLE 2F7124

## (undated) DEVICE — DRSG STERI STRIP 1/2X4" R1547

## (undated) DEVICE — SUCTION MANIFOLD NEPTUNE 2 SYS 4 PORT 0702-020-000

## (undated) DEVICE — SOL ADH LIQUID BENZOIN SWAB 0.6ML C1544

## (undated) DEVICE — GLOVE PROTEXIS MICRO 7.5  2D73PM75

## (undated) DEVICE — STPL ENDO LINEAR CUT ECHELON GST 45MM COMPACT PCEE45A

## (undated) DEVICE — NDL INSUFFLATION 13GA 120MM C2201

## (undated) DEVICE — TUBING SUCTION 10'X3/16" N510

## (undated) DEVICE — SPONGE LAP 18X18" X8435

## (undated) DEVICE — SU MONOCRYL 3-0 PS-1 27" Y936H

## (undated) DEVICE — ENDO TROCAR FIRST ENTRY KII FIOS ADV FIX 05X100MM CFF03

## (undated) DEVICE — SOL NACL 0.9% INJ 1000ML BAG 2B1324X

## (undated) DEVICE — PREP CHLORAPREP 26ML TINTED ORANGE  260815

## (undated) DEVICE — LINEN TOWEL PACK X6 WHITE 5487

## (undated) DEVICE — SU VICRYL 3-0 SH 27" UND J416H

## (undated) DEVICE — BLADE CLIPPER SGL USE 9680

## (undated) DEVICE — NDL COUNTER 20CT 31142493

## (undated) DEVICE — LINEN GOWN XLG 5407

## (undated) DEVICE — SU VICRYL 0 TIE 54" J608H

## (undated) DEVICE — ESU GROUND PAD ADULT W/CORD E7507

## (undated) DEVICE — PREP POVIDONE IODINE SOLUTION 10% 4OZ

## (undated) DEVICE — SU VICRYL 0 CT-1 CR 8X27" UND JJ41G

## (undated) DEVICE — CLIP HORIZON MED BLUE 002200

## (undated) DEVICE — SPONGE KITTNER 30-101

## (undated) DEVICE — WIPE PREMOIST CLEANSING WASHCLOTHS 7988

## (undated) DEVICE — ESU ENDO SCISSORS 5MM CVD 5DCS

## (undated) DEVICE — SU VICRYL 2-0 CT-1 27" UND J259H

## (undated) DEVICE — SUCTION TIP POOLE K770

## (undated) DEVICE — ESU LIGASURE LAPAROSCOPIC BLUNT TIP SEALER 5MMX37CM LF1837

## (undated) DEVICE — SUCTION SLEEVE NEPTUNE 2 165MM 0703-005-165

## (undated) DEVICE — SUCTION CANISTER 3000ML

## (undated) RX ORDER — HEPARIN SODIUM 5000 [USP'U]/.5ML
INJECTION, SOLUTION INTRAVENOUS; SUBCUTANEOUS
Status: DISPENSED
Start: 2021-04-06

## (undated) RX ORDER — HYDROMORPHONE HYDROCHLORIDE 1 MG/ML
INJECTION, SOLUTION INTRAMUSCULAR; INTRAVENOUS; SUBCUTANEOUS
Status: DISPENSED
Start: 2021-04-06

## (undated) RX ORDER — LIDOCAINE HYDROCHLORIDE 20 MG/ML
INJECTION, SOLUTION EPIDURAL; INFILTRATION; INTRACAUDAL; PERINEURAL
Status: DISPENSED
Start: 2021-04-06

## (undated) RX ORDER — ONDANSETRON 2 MG/ML
INJECTION INTRAMUSCULAR; INTRAVENOUS
Status: DISPENSED
Start: 2021-04-06

## (undated) RX ORDER — FENTANYL CITRATE 50 UG/ML
INJECTION, SOLUTION INTRAMUSCULAR; INTRAVENOUS
Status: DISPENSED
Start: 2021-04-06

## (undated) RX ORDER — DEXAMETHASONE SODIUM PHOSPHATE 4 MG/ML
INJECTION, SOLUTION INTRA-ARTICULAR; INTRALESIONAL; INTRAMUSCULAR; INTRAVENOUS; SOFT TISSUE
Status: DISPENSED
Start: 2021-04-06

## (undated) RX ORDER — KETOROLAC TROMETHAMINE 30 MG/ML
INJECTION, SOLUTION INTRAMUSCULAR; INTRAVENOUS
Status: DISPENSED
Start: 2021-04-06

## (undated) RX ORDER — ESMOLOL HYDROCHLORIDE 10 MG/ML
INJECTION INTRAVENOUS
Status: DISPENSED
Start: 2021-04-06

## (undated) RX ORDER — PROPOFOL 10 MG/ML
INJECTION, EMULSION INTRAVENOUS
Status: DISPENSED
Start: 2021-04-06

## (undated) RX ORDER — SCOLOPAMINE TRANSDERMAL SYSTEM 1 MG/1
PATCH, EXTENDED RELEASE TRANSDERMAL
Status: DISPENSED
Start: 2021-04-06

## (undated) RX ORDER — PHENAZOPYRIDINE HYDROCHLORIDE 200 MG/1
TABLET, FILM COATED ORAL
Status: DISPENSED
Start: 2021-04-06

## (undated) RX ORDER — CLINDAMYCIN PHOSPHATE 900 MG/50ML
INJECTION, SOLUTION INTRAVENOUS
Status: DISPENSED
Start: 2021-04-06